# Patient Record
Sex: FEMALE | Race: WHITE | NOT HISPANIC OR LATINO | Employment: FULL TIME | URBAN - METROPOLITAN AREA
[De-identification: names, ages, dates, MRNs, and addresses within clinical notes are randomized per-mention and may not be internally consistent; named-entity substitution may affect disease eponyms.]

---

## 2018-01-10 ENCOUNTER — OFFICE VISIT CONVERTED (OUTPATIENT)
Dept: FAMILY MEDICINE CLINIC | Facility: CLINIC | Age: 38
End: 2018-01-10
Attending: NURSE PRACTITIONER

## 2018-11-26 ENCOUNTER — OFFICE VISIT CONVERTED (OUTPATIENT)
Dept: FAMILY MEDICINE CLINIC | Facility: CLINIC | Age: 38
End: 2018-11-26
Attending: NURSE PRACTITIONER

## 2019-04-28 ENCOUNTER — HOSPITAL ENCOUNTER (OUTPATIENT)
Dept: URGENT CARE | Facility: CLINIC | Age: 39
Discharge: HOME | End: 2019-04-28

## 2019-05-01 LAB — BACTERIA SPEC AEROBE CULT: NORMAL

## 2019-10-25 ENCOUNTER — HOSPITAL ENCOUNTER (OUTPATIENT)
Dept: URGENT CARE | Facility: CLINIC | Age: 39
Discharge: HOME | End: 2019-10-25

## 2020-04-27 ENCOUNTER — OFFICE VISIT CONVERTED (OUTPATIENT)
Dept: ORTHOPEDIC SURGERY | Facility: CLINIC | Age: 40
End: 2020-04-27
Attending: ORTHOPAEDIC SURGERY

## 2020-07-26 ENCOUNTER — HOSPITAL ENCOUNTER (OUTPATIENT)
Dept: URGENT CARE | Facility: CLINIC | Age: 40
Discharge: HOME | End: 2020-07-26
Attending: EMERGENCY MEDICINE

## 2020-07-26 LAB — URATE SERPL-MCNC: 5.1 MG/DL (ref 2.5–7.5)

## 2020-09-11 ENCOUNTER — OFFICE VISIT CONVERTED (OUTPATIENT)
Dept: ORTHOPEDIC SURGERY | Facility: CLINIC | Age: 40
End: 2020-09-11
Attending: ORTHOPAEDIC SURGERY

## 2020-09-11 ENCOUNTER — CONVERSION ENCOUNTER (OUTPATIENT)
Dept: ORTHOPEDIC SURGERY | Facility: CLINIC | Age: 40
End: 2020-09-11

## 2020-10-16 ENCOUNTER — HOSPITAL ENCOUNTER (OUTPATIENT)
Dept: URGENT CARE | Facility: CLINIC | Age: 40
Discharge: HOME | End: 2020-10-16
Attending: PHYSICIAN ASSISTANT

## 2020-10-20 LAB — SARS-COV-2 RNA SPEC QL NAA+PROBE: NOT DETECTED

## 2021-05-10 NOTE — H&P
History and Physical      Patient Name: Sunitha Ly   Patient ID: [id number]   Sex: Female   YOB: 1980    Primary Care Provider: Leanne OLIVIER    Visit Date: 2020    Provider: Ramy Lopez MD   Location: [facility]   Location Address: [address] [place] [address]  [id number]   Location Phone: [phone]          Chief Complaint  · Left shoulder pain      History Of Present Illness  Sunitha Ly is a 40 year old /White female who presents today to [facility].      Patient presents today for left shoulder pain that started after moving furniture. Patient states pain in the posterior aspect of the shoulder, radiating down to the left elbow. Patient states history of left shoulder bursitis. Patient received a steroid injection in  that did provide relief of pain.       Past Medical History  Allergic rhinitis; Arthritis; Bladder Disorder; Gall Stones; Kidney calculus; Obstructive sleep apnea; Seasonal allergies; Shoulder bursitis; STD exposure         Past Surgical History  C-sections; Cesarian Section; Cholecystectomy; Dilation and curretage; Gallbladder; Hysterectomy; Kidney; Kidney Stone Surgery, Unspecified         Medication List  Aleve oral; diclofenac sodium 75 mg oral tablet,delayed release (DR/EC); Vitamin D3 2,000 unit oral capsule; Zyrtec oral         Allergy List  NO KNOWN DRUG ALLERGIES         Family Medical History  Cancer, Unspecified; Family history of Arthritis         Reproductive History   3 Para 2 0 0 0       Social History  Alcohol (Never); Alcohol Use (Current some day); Claustophobic (Unknown); lives with children; lives with spouse; ; .; Recreational Drug Use (Never); Tobacco (Never); Working         Review of Systems  · Constitutional  o Denies  o : fever, chills, weight loss  · Cardiovascular  o Denies  o : chest pain, shortness of breath  · Gastrointestinal  o Denies  o : liver disease, heartburn,  nausea, blood in stools  · Genitourinary  o Denies  o : painful urination, blood in urine  · Integument  o Denies  o : rash, itching  · Neurologic  o Denies  o : headache, weakness, loss of consciousness  · Musculoskeletal  o Denies  o : painful, swollen joints  · Psychiatric  o Denies  o : drug/alcohol addiction, anxiety, depression      Vitals  Date Time BP Position Site L\R Cuff Size HR RR TEMP (F) WT  HT  BMI kg/m2 BSA m2 O2 Sat        04/27/2020 12:58 PM         240lbs 0oz 5'   46.87 2.15           Physical Examination  · Constitutional  o Appearance  o : well developed, well-nourished, no obvious deformities present  · Head and Face  o Head  o :   § Inspection  § : normocephalic  o Face  o :   § Inspection  § : no facial lesions  · Eyes  o Conjunctivae  o : conjunctivae normal  o Sclerae  o : sclerae white  · Ears, Nose, Mouth and Throat  o Ears  o :   § External Ears  § : appearance within normal limits  § Hearing  § : intact  o Nose  o :   § External Nose  § : appearance normal  · Neck  o Inspection/Palpation  o : normal appearance  o Range of Motion  o : full range of motion  · Respiratory  o Respiratory Effort  o : breathing unlabored  o Inspection of Chest  o : normal appearance  o Auscultation of Lungs  o : no audible wheezing or rales  · Cardiovascular  o Heart  o : regular rate  · Gastrointestinal  o Abdominal Examination  o : soft and non-tender  · Skin and Subcutaneous Tissue  o General Inspection  o : intact, no rashes  · Psychiatric  o General  o : Alert and oriented x3  o Judgement and Insight  o : judgment and insight intact  o Mood and Affect  o : mood normal, affect appropriate  · Left Shoulder  o Inspection  o : No skin discoloration, atrophy or swelling. Palpable tenderness to the subacromial bursa. Palpable tenderness to the Ac joint. Palpable tenderness onto the greater tuberosity . She has full ROM. Negative empty can. Negative Neers. Negative Hays. Neurovascularly intact. Sensations  grossly intact.   · Injection Note/Aspiration Note  o Site  o : left shoulder   o Procedure  o : Procedure: After educating the patient, patient gave consent for procedure. After using Chloraprep, the joint space was injected. The patient tolerated the procedure well.   o Medication  o : 80 mg of DepoMedrol with 9cc of 1% Lidocaine  · In Office Procedures  o View  o : AP/LATERAL  o Site  o : left shoulder  o Indication  o : left shoulder pain  o Study  o : X-rays ordered, taken in the office, and reviewed today.  o Xray  o : AC joint osteoarthritis  o Comparative Data  o : No comparative data found          Assessment  · Left shoulder pain, left shoulder bursitis     719.41/M25.512      Plan  · Orders  o Depo-Medrol injection 80mg () - - 04/27/2020   Lot [id number] exp 05 21 Paola Lopez MD  o Shoulder Intra-articular Injection without US Guidance [facility] ([id number]) - - 04/27/2020   Lot [id number] exp 07 01 21 [place]osp[place] Ramy Lopez MD  o Scapula (Left) [facility] Preferred View ([id number]) - 719.41/M25.512 - 04/27/2020  · Medications  o Medications have been Reconciled  o Transition of Care or Provider Policy  · Instructions  o Reviewed the patient's Past Medical, Social, and Family history as well as the ROS at today's visit, no changes.  o Call or return if worsening symptoms.  o X-ray ordered, taken and reviewed at this visit.  o Reviewed Xrays.  o This note was transcribed by Dimitrios Flores. lili  o Discussed plan with patient. Patient received a left shoulder steroid injection and Voltaren 75mg one pill bid with food. Follow up as needed. Home exercises explained and provided            Electronically Signed by: Gilmer Flores - , Other -Author on April 28, 2020 03:22:01 PM  Electronically Co-signed by: Glenny Watts PA-C -Reviewer on April 29, 2020 07:35:16 AM  Electronically Co-signed by: Ramy Lopez MD -Reviewer on April 29, 2020 08:59:30 AM

## 2021-05-10 NOTE — H&P
History and Physical      Patient Name: Sunitha Ly   Patient ID: [id number]   Sex: Female   YOB: 1980    Primary Care Provider: Leanne OLIVIER    Visit Date: 2020    Provider: Ramy Lopez MD   Location: [facility]   Location Address: [address] [place] [address]  [id number]   Location Phone: [phone]          Chief Complaint  · left ankle pain      History Of Present Illness  Sunitha Ly is a 40 year old /White female who presents today to [facility].      Patient presents today with chief complaint of left ankle pain. Patient states that she has no injury to her ankle but it just started hurting. Patient was on her feet a lot one day and her feet started hurting. The more the patient was staying on her feet for a couple more days and the ankle started to hurt and swell a lot. Patient states that her ankle isn't hurting her too much today. Patient had some swelling on and off for awhile since then                 Past Medical History  Allergic rhinitis; Arthritis; Bladder Disorder; Gall Stones; Kidney calculus; Obstructive sleep apnea; Seasonal allergies; Shoulder bursitis; STD exposure         Past Surgical History  C-sections; Cesarian Section; Cholecystectomy; Dilation and curretage; Gallbladder; Hysterectomy; Kidney; Kidney Stone Surgery, Unspecified         Medication List  Aleve oral; diclofenac sodium 75 mg oral tablet,delayed release (DR/EC); Vitamin D3 2,000 unit oral capsule; Zyrtec oral         Allergy List  NO KNOWN DRUG ALLERGIES       Allergies Reconciled  Family Medical History  Cancer, Unspecified; Family history of Arthritis         Reproductive History   3 Para 2 0 0 0       Social History  Alcohol (Never); Alcohol Use (Current some day); Claustophobic (Unknown); lives with children; lives with spouse; ; .; Recreational Drug Use (Never); Tobacco (Never); Working         Review of  Systems  · Constitutional  o Denies  o : fever, chills, weight loss  · Cardiovascular  o Denies  o : chest pain, shortness of breath  · Gastrointestinal  o Denies  o : liver disease, heartburn, nausea, blood in stools  · Genitourinary  o Denies  o : painful urination, blood in urine  · Integument  o Denies  o : rash, itching  · Neurologic  o Denies  o : headache, weakness, loss of consciousness  · Musculoskeletal  o Denies  o : painful, swollen joints  · Psychiatric  o Denies  o : drug/alcohol addiction, anxiety, depression      Vitals  Date Time BP Position Site L\R Cuff Size HR RR TEMP (F) WT  HT  BMI kg/m2 BSA m2 O2 Sat HC       09/11/2020 04:10 PM      64 - R   241lbs 16oz 5'   47.26 2.16 98 %          Physical Examination  · Constitutional  o Appearance  o : well developed, well-nourished, no obvious deformities present  · Head and Face  o Head  o :   § Inspection  § : normocephalic  o Face  o :   § Inspection  § : no facial lesions  · Eyes  o Conjunctivae  o : conjunctivae normal  o Sclerae  o : sclerae white  · Ears, Nose, Mouth and Throat  o Ears  o :   § External Ears  § : appearance within normal limits  § Hearing  § : intact  o Nose  o :   § External Nose  § : appearance normal  · Neck  o Inspection/Palpation  o : normal appearance  o Range of Motion  o : full range of motion  · Respiratory  o Respiratory Effort  o : breathing unlabored  o Inspection of Chest  o : normal appearance  o Auscultation of Lungs  o : no audible wheezing or rales  · Cardiovascular  o Heart  o : regular rate  · Gastrointestinal  o Abdominal Examination  o : soft and non-tender  · Skin and Subcutaneous Tissue  o General Inspection  o : intact, no rashes  · Psychiatric  o General  o : Alert and oriented x3  o Judgement and Insight  o : judgment and insight intact  o Mood and Affect  o : mood normal, affect appropriate  · Left Ankle/Foot  o Inspection  o : Sensation grossly intact. Neurovascular intact. Pulses normal. Mildly tender  to the lateral side of ankle. Mild swelling of left ankle compared to right. Stable gait. No skin discoloration or atrophy.   · In Office Procedures  o View  o : AP/LATERAL  o Site  o : left, ankle  o Indication  o : left ankle pain  o Study  o : X-rays ordered, taken in the office, and reviewed today.  o Xray  o : No acute osseous abnormality. No signs of fracture.  o Comparative Data  o : No comparative data found          Assessment  · Left ankle pain     719.47/M25.572      Plan  · Orders  o Ankle (Left) [facility] Preferred View ([id number]) - 719.47/M25.572 - 09/11/2020  · Medications  o Medications have been Reconciled  o Transition of Care or Provider Policy  · Instructions  o Dr. Lopez saw and examined the patient and agrees with plan.   o X-rays reviewed by Dr. Lopez.  o Reviewed the patient's Past Medical, Social, and Family history as well as the ROS at today's visit, no changes.  o Call or return if worsening symptoms.  o Follow Up PRN.  o This note was transcribed by Perri Michel. lili  o Discussed diagnosis and treatment options with the patient. Discussed the option of PT if pain worsens. Patient will follow-up PRN.            Electronically Signed by: Perri Michel-, Other -Author on September 14, 2020 09:31:33 AM  Electronically Co-signed by: Ramy Lopez MD -Reviewer on September 14, 2020 05:32:57 PM

## 2021-05-14 VITALS — BODY MASS INDEX: 47.51 KG/M2 | HEIGHT: 60 IN | HEART RATE: 64 BPM | OXYGEN SATURATION: 98 % | WEIGHT: 242 LBS

## 2021-05-15 VITALS — BODY MASS INDEX: 47.12 KG/M2 | HEIGHT: 60 IN | WEIGHT: 240 LBS

## 2021-05-16 VITALS
HEIGHT: 60 IN | SYSTOLIC BLOOD PRESSURE: 133 MMHG | OXYGEN SATURATION: 99 % | DIASTOLIC BLOOD PRESSURE: 77 MMHG | WEIGHT: 239.25 LBS | RESPIRATION RATE: 12 BRPM | HEART RATE: 86 BPM | TEMPERATURE: 97.9 F | BODY MASS INDEX: 46.97 KG/M2

## 2021-10-28 ENCOUNTER — APPOINTMENT (OUTPATIENT)
Dept: CT IMAGING | Facility: HOSPITAL | Age: 41
End: 2021-10-28

## 2021-10-28 ENCOUNTER — APPOINTMENT (OUTPATIENT)
Dept: ULTRASOUND IMAGING | Facility: HOSPITAL | Age: 41
End: 2021-10-28

## 2021-10-28 ENCOUNTER — HOSPITAL ENCOUNTER (EMERGENCY)
Facility: HOSPITAL | Age: 41
Discharge: HOME | End: 2021-10-28
Attending: EMERGENCY MEDICINE | Admitting: EMERGENCY MEDICINE

## 2021-10-28 VITALS
WEIGHT: 240.3 LBS | TEMPERATURE: 98 F | HEIGHT: 60 IN | DIASTOLIC BLOOD PRESSURE: 99 MMHG | RESPIRATION RATE: 16 BRPM | HEART RATE: 68 BPM | SYSTOLIC BLOOD PRESSURE: 165 MMHG | BODY MASS INDEX: 47.18 KG/M2 | OXYGEN SATURATION: 98 %

## 2021-10-28 DIAGNOSIS — N83.209 CYST OF OVARY, UNSPECIFIED LATERALITY: Primary | ICD-10-CM

## 2021-10-28 LAB
ALBUMIN SERPL-MCNC: 4 G/DL (ref 3.5–5.2)
ALBUMIN/GLOB SERPL: 1.3 G/DL
ALP SERPL-CCNC: 62 U/L (ref 39–117)
ALT SERPL W P-5'-P-CCNC: 18 U/L (ref 1–33)
ANION GAP SERPL CALCULATED.3IONS-SCNC: 10.9 MMOL/L (ref 5–15)
AST SERPL-CCNC: 12 U/L (ref 1–32)
BASOPHILS # BLD AUTO: 0.04 10*3/MM3 (ref 0–0.2)
BASOPHILS NFR BLD AUTO: 0.7 % (ref 0–1.5)
BILIRUB SERPL-MCNC: 0.4 MG/DL (ref 0–1.2)
BILIRUB UR QL STRIP: NEGATIVE
BUN SERPL-MCNC: 12 MG/DL (ref 6–20)
BUN/CREAT SERPL: 16.4 (ref 7–25)
CALCIUM SPEC-SCNC: 8.7 MG/DL (ref 8.6–10.5)
CHLORIDE SERPL-SCNC: 102 MMOL/L (ref 98–107)
CLARITY UR: CLEAR
CO2 SERPL-SCNC: 25.1 MMOL/L (ref 22–29)
COLOR UR: YELLOW
CREAT SERPL-MCNC: 0.73 MG/DL (ref 0.57–1)
DEPRECATED RDW RBC AUTO: 37.7 FL (ref 37–54)
EOSINOPHIL # BLD AUTO: 0.21 10*3/MM3 (ref 0–0.4)
EOSINOPHIL NFR BLD AUTO: 3.4 % (ref 0.3–6.2)
ERYTHROCYTE [DISTWIDTH] IN BLOOD BY AUTOMATED COUNT: 12.5 % (ref 12.3–15.4)
GFR SERPL CREATININE-BSD FRML MDRD: 89 ML/MIN/1.73
GLOBULIN UR ELPH-MCNC: 3.1 GM/DL
GLUCOSE SERPL-MCNC: 117 MG/DL (ref 65–99)
GLUCOSE UR STRIP-MCNC: NEGATIVE MG/DL
HCT VFR BLD AUTO: 41.8 % (ref 34–46.6)
HGB BLD-MCNC: 13.9 G/DL (ref 12–15.9)
HGB UR QL STRIP.AUTO: NEGATIVE
HOLD SPECIMEN: NORMAL
HOLD SPECIMEN: NORMAL
IMM GRANULOCYTES # BLD AUTO: 0.04 10*3/MM3 (ref 0–0.05)
IMM GRANULOCYTES NFR BLD AUTO: 0.7 % (ref 0–0.5)
KETONES UR QL STRIP: NEGATIVE
LEUKOCYTE ESTERASE UR QL STRIP.AUTO: NEGATIVE
LIPASE SERPL-CCNC: 18 U/L (ref 13–60)
LYMPHOCYTES # BLD AUTO: 1.63 10*3/MM3 (ref 0.7–3.1)
LYMPHOCYTES NFR BLD AUTO: 26.7 % (ref 19.6–45.3)
MCH RBC QN AUTO: 27.9 PG (ref 26.6–33)
MCHC RBC AUTO-ENTMCNC: 33.3 G/DL (ref 31.5–35.7)
MCV RBC AUTO: 83.9 FL (ref 79–97)
MONOCYTES # BLD AUTO: 0.38 10*3/MM3 (ref 0.1–0.9)
MONOCYTES NFR BLD AUTO: 6.2 % (ref 5–12)
NEUTROPHILS NFR BLD AUTO: 3.8 10*3/MM3 (ref 1.7–7)
NEUTROPHILS NFR BLD AUTO: 62.3 % (ref 42.7–76)
NITRITE UR QL STRIP: NEGATIVE
NRBC BLD AUTO-RTO: 0 /100 WBC (ref 0–0.2)
PH UR STRIP.AUTO: 7.5 [PH] (ref 5–8)
PLATELET # BLD AUTO: 163 10*3/MM3 (ref 140–450)
PMV BLD AUTO: 10.8 FL (ref 6–12)
POTASSIUM SERPL-SCNC: 3.9 MMOL/L (ref 3.5–5.2)
PROT SERPL-MCNC: 7.1 G/DL (ref 6–8.5)
PROT UR QL STRIP: NEGATIVE
RBC # BLD AUTO: 4.98 10*6/MM3 (ref 3.77–5.28)
SODIUM SERPL-SCNC: 138 MMOL/L (ref 136–145)
SP GR UR STRIP: 1.01 (ref 1–1.03)
UROBILINOGEN UR QL STRIP: NORMAL
WBC # BLD AUTO: 6.1 10*3/MM3 (ref 3.4–10.8)
WHOLE BLOOD HOLD SPECIMEN: NORMAL
WHOLE BLOOD HOLD SPECIMEN: NORMAL

## 2021-10-28 PROCEDURE — 81003 URINALYSIS AUTO W/O SCOPE: CPT | Performed by: EMERGENCY MEDICINE

## 2021-10-28 PROCEDURE — 83690 ASSAY OF LIPASE: CPT | Performed by: EMERGENCY MEDICINE

## 2021-10-28 PROCEDURE — 36415 COLL VENOUS BLD VENIPUNCTURE: CPT | Performed by: EMERGENCY MEDICINE

## 2021-10-28 PROCEDURE — 99283 EMERGENCY DEPT VISIT LOW MDM: CPT

## 2021-10-28 PROCEDURE — 76830 TRANSVAGINAL US NON-OB: CPT

## 2021-10-28 PROCEDURE — 80053 COMPREHEN METABOLIC PANEL: CPT | Performed by: EMERGENCY MEDICINE

## 2021-10-28 PROCEDURE — 85025 COMPLETE CBC W/AUTO DIFF WBC: CPT | Performed by: EMERGENCY MEDICINE

## 2021-10-28 PROCEDURE — 96374 THER/PROPH/DIAG INJ IV PUSH: CPT

## 2021-10-28 PROCEDURE — 25010000002 KETOROLAC TROMETHAMINE PER 15 MG: Performed by: EMERGENCY MEDICINE

## 2021-10-28 PROCEDURE — 74176 CT ABD & PELVIS W/O CONTRAST: CPT

## 2021-10-28 RX ORDER — KETOROLAC TROMETHAMINE 10 MG/1
10 TABLET, FILM COATED ORAL EVERY 6 HOURS PRN
Qty: 15 TABLET | Refills: 0 | Status: SHIPPED | OUTPATIENT
Start: 2021-10-28 | End: 2022-08-09

## 2021-10-28 RX ORDER — OXYCODONE HYDROCHLORIDE AND ACETAMINOPHEN 5; 325 MG/1; MG/1
1 TABLET ORAL EVERY 6 HOURS PRN
Qty: 12 TABLET | Refills: 0 | Status: SHIPPED | OUTPATIENT
Start: 2021-10-28 | End: 2022-08-09

## 2021-10-28 RX ORDER — KETOROLAC TROMETHAMINE 15 MG/ML
15 INJECTION, SOLUTION INTRAMUSCULAR; INTRAVENOUS ONCE
Status: COMPLETED | OUTPATIENT
Start: 2021-10-28 | End: 2021-10-28

## 2021-10-28 RX ORDER — SODIUM CHLORIDE 0.9 % (FLUSH) 0.9 %
10 SYRINGE (ML) INJECTION AS NEEDED
Status: DISCONTINUED | OUTPATIENT
Start: 2021-10-28 | End: 2021-10-28 | Stop reason: HOSPADM

## 2021-10-28 RX ADMIN — KETOROLAC TROMETHAMINE 15 MG: 15 INJECTION, SOLUTION INTRAMUSCULAR; INTRAVENOUS at 10:53

## 2021-11-02 ENCOUNTER — OFFICE VISIT (OUTPATIENT)
Dept: OBSTETRICS AND GYNECOLOGY | Facility: CLINIC | Age: 41
End: 2021-11-02

## 2021-11-02 VITALS
HEART RATE: 107 BPM | SYSTOLIC BLOOD PRESSURE: 128 MMHG | DIASTOLIC BLOOD PRESSURE: 84 MMHG | WEIGHT: 240 LBS | BODY MASS INDEX: 44.16 KG/M2 | HEIGHT: 62 IN

## 2021-11-02 DIAGNOSIS — Z12.31 SCREENING MAMMOGRAM FOR BREAST CANCER: ICD-10-CM

## 2021-11-02 DIAGNOSIS — N83.201 CYSTS OF BOTH OVARIES: ICD-10-CM

## 2021-11-02 DIAGNOSIS — N83.202 CYSTS OF BOTH OVARIES: ICD-10-CM

## 2021-11-02 DIAGNOSIS — Z01.419 WELL WOMAN EXAM WITH ROUTINE GYNECOLOGICAL EXAM: Primary | ICD-10-CM

## 2021-11-02 LAB
GLUCOSE UR STRIP-MCNC: NEGATIVE MG/DL
PROT UR STRIP-MCNC: NEGATIVE MG/DL

## 2021-11-02 PROCEDURE — G0123 SCREEN CERV/VAG THIN LAYER: HCPCS | Performed by: NURSE PRACTITIONER

## 2021-11-02 PROCEDURE — 99212 OFFICE O/P EST SF 10 MIN: CPT | Performed by: NURSE PRACTITIONER

## 2021-11-02 PROCEDURE — 81002 URINALYSIS NONAUTO W/O SCOPE: CPT | Performed by: NURSE PRACTITIONER

## 2021-11-02 PROCEDURE — 99385 PREV VISIT NEW AGE 18-39: CPT | Performed by: NURSE PRACTITIONER

## 2021-11-02 RX ORDER — ERGOCALCIFEROL (VITAMIN D2) 10 MCG
500 TABLET ORAL DAILY
COMMUNITY

## 2021-11-02 NOTE — PROGRESS NOTES
"  HPI:   41 y.o..Presents for well woman exam. Contraception or HRT: s/p hysterectomy supracervical, ovaries remain, secondary to emergency post partum, Placenta accreta, uterine attatchement  Menses:   No vb  Pain: intermittent cyclical pelvic cramping and low back lasting 1 day   Last pap normal   Recent visit to ER for evaluation of lower abdominal cramping and left low back pain. nonobstruction kidney stone noted left kidney in addition pelvic ultrasound revealed a 4.5 centimeter lesion in the left ovary without internal vascularity, likely a hemorrhagic cyst.  Endometrioma thought less likely, a 4.6 centimeter structure in the expected region of the uterus that may reflect a prominent cervix and vaginal cuff.       Past Medical History:   Diagnosis Date   • Abnormal Pap smear of cervix    • Chlamydia    • Endometriosis    • Gonorrhea    • Kidney stone    • Migraine    • Ovarian cyst    • Placenta accreta    • Urinary tract infection       Past Surgical History:   Procedure Laterality Date   •  SECTION     • CHOLECYSTECTOMY     • CYSTOSCOPY BLADDER STONE LITHOTRIPSY     • HYSTERECTOMY      emergency post partum, Placenta accreta   • WISDOM TOOTH EXTRACTION        Family History   Problem Relation Age of Onset   • Hypertension Father    • Breast cancer Mother 52   • Hypertension Brother    • Melanoma Paternal Grandfather 80   • Prostate cancer Maternal Grandfather         PCP: does manage PMHx and preventative labs      PHYSICAL EXAM:  /84   Pulse 107   Ht 156.2 cm (61.5\")   Wt 109 kg (240 lb)   Breastfeeding No   BMI 44.61 kg/m²   General- NAD, alert and oriented, appropriate  Psych- Normal mood, good memory  Neck- No masses, no thyroid enlargement  Lymphatic- No palpable neck, axillary, or groin nodes  CV- Regular rhythm, no murnurs  Resp- CTA to bases, no wheezes  Abdomen- Soft, non distended, non tender, no masses  Breast left-  Bilaterally symmetrical, no masses, non tender, no " nipple discharge  Breast right- Bilaterally symmetrical, no masses, non tender, no nipple discharge  External genitalia- Normal female, no lesions  Urethra/meatus- Normal, no masses, non tender, no prolapse  Bladder- Normal, no masses, non tender, no prolapse  Vagina- Normal, no atrophy, no lesions, no discharge, no prolapse  Cvx- Normal, no lesions, no discharge, No cervical motion tenderness  Uterus- Absent  Adnexa- No mass, non tender  Anus/Rectum/Perineum- Not performed  Ext- No edema, no cyanosis    Skin- No lesions, no rashes, no acanthosis nigricans        ASSESSMENT and PLAN:  WWE    Diagnoses and all orders for this visit:    1. Well woman exam with routine gynecological exam (Primary)  -     IGP,rfx Aptima HPV All Pth  -     POC Urinalysis Dipstick    2. Screening mammogram for breast cancer  -     Mammo Screening Digital Tomosynthesis Bilateral With CAD; Future    3. Cysts of both ovaries  -     US Pelvis Transvaginal Non OB; Future         Glucose, UA   Date Value Ref Range Status   11/02/2021 Negative Negative, 1000 mg/dL (3+) mg/dL Final      Protein, POC   Date Value Ref Range Status   11/02/2021 Negative Negative mg/dL Final        Preventative:   BREAST HEALTH- Monthly self breast exam importance and how to reviewed. MMG and/or MRI (prn) reviewed per society guidelines and her individual history. Screen: Updated today.  CERVICAL CANCER Screening- Reviewed current ASCCP guidelines for screening w and wo cotest HPV, age specific.  Screen: Updated today.  COLON CANCER Screening- Reviewed current medical society guidelines and options.  Screen:  Not medically needed.  SEXUAL HEALTH: Declines STD screening.  Follow up PCP/Specialist PMHx and Labs  Myriad: previous testing, no gene mutation identified      She understands the importance of having any ordered tests to be performed in a timely fashion.  The risks of not performing them include, but are not limited to, advanced cancer stages, bone loss from  osteoporosis and/or subsequent increase in morbidity and/or mortality.  She is encouraged to review her results online and/or contact or office if she has questions.     Follow Up:  Return after ultrasound.      SOY Brown  11/02/2021

## 2021-11-08 LAB
CONV .: NORMAL
CYTOLOGIST CVX/VAG CYTO: NORMAL
CYTOLOGY CVX/VAG DOC CYTO: NORMAL
CYTOLOGY CVX/VAG DOC THIN PREP: NORMAL
DX ICD CODE: NORMAL
HIV 1 & 2 AB SER-IMP: NORMAL
OTHER STN SPEC: NORMAL
STAT OF ADQ CVX/VAG CYTO-IMP: NORMAL

## 2021-12-14 ENCOUNTER — HOSPITAL ENCOUNTER (OUTPATIENT)
Dept: ULTRASOUND IMAGING | Facility: HOSPITAL | Age: 41
Discharge: HOME | End: 2021-12-14
Admitting: NURSE PRACTITIONER

## 2021-12-14 DIAGNOSIS — N83.202 CYSTS OF BOTH OVARIES: ICD-10-CM

## 2021-12-14 DIAGNOSIS — N83.201 CYSTS OF BOTH OVARIES: ICD-10-CM

## 2021-12-14 PROCEDURE — 76830 TRANSVAGINAL US NON-OB: CPT

## 2021-12-14 PROCEDURE — 76856 US EXAM PELVIC COMPLETE: CPT

## 2021-12-15 ENCOUNTER — OFFICE VISIT (OUTPATIENT)
Dept: OBSTETRICS AND GYNECOLOGY | Facility: CLINIC | Age: 41
End: 2021-12-15

## 2021-12-15 VITALS
DIASTOLIC BLOOD PRESSURE: 87 MMHG | SYSTOLIC BLOOD PRESSURE: 142 MMHG | WEIGHT: 244 LBS | BODY MASS INDEX: 45.36 KG/M2 | HEART RATE: 79 BPM

## 2021-12-15 DIAGNOSIS — R10.31 RIGHT LOWER QUADRANT ABDOMINAL PAIN: Primary | ICD-10-CM

## 2021-12-15 PROCEDURE — 99213 OFFICE O/P EST LOW 20 MIN: CPT | Performed by: OBSTETRICS & GYNECOLOGY

## 2021-12-15 NOTE — PROGRESS NOTES
GYN Problem/Follow Up Visit    Chief Complaint   Patient presents with   • Follow-up     US done       Complaint prior acute left-sided/pelvic pain     HPI  Sunitha Ly is a 41 y.o. female, , who presents for evaluation of pain and review of ultrasound  Sunitha's ultrasound on 2021 noted a 5 cm possible hemorrhagic cyst.  The ultrasound repeated yesterday on  noted a normal left ovary.    Additional OB/GYN History   No LMP recorded. Patient has had a hysterectomy.  Allergies : Patient has no known allergies.       Objective   /87   Pulse 79   Wt 111 kg (244 lb)   BMI 45.36 kg/m²     Physical Exam   Alert and oriented x4  Elevated BMI of 45       Assessment and Plan    Diagnoses and all orders for this visit:    1. Right lower quadrant abdominal pain (Primary)    Pain has resolved.  The ultrasound notes no ovarian structure that would require treatment.  No surgery needed.  Discussed that PCO  would be a benign problem after hysterectomy.  Encourage her to follow-up with PCP concerning insulin resistance.  Encourage use of NSAIDs as needed    Follow Up:  No follow-ups on file.        Juaquin Quintero Sr., MD  12/15/2021

## 2022-02-01 ENCOUNTER — HOSPITAL ENCOUNTER (OUTPATIENT)
Dept: MAMMOGRAPHY | Facility: HOSPITAL | Age: 42
Discharge: HOME | End: 2022-02-01
Admitting: NURSE PRACTITIONER

## 2022-02-01 DIAGNOSIS — Z12.31 SCREENING MAMMOGRAM FOR BREAST CANCER: ICD-10-CM

## 2022-02-01 PROCEDURE — 77067 SCR MAMMO BI INCL CAD: CPT

## 2022-08-09 ENCOUNTER — OFFICE VISIT (OUTPATIENT)
Dept: FAMILY MEDICINE CLINIC | Facility: CLINIC | Age: 42
End: 2022-08-09

## 2022-08-09 VITALS
HEART RATE: 78 BPM | OXYGEN SATURATION: 99 % | WEIGHT: 253 LBS | DIASTOLIC BLOOD PRESSURE: 78 MMHG | SYSTOLIC BLOOD PRESSURE: 132 MMHG | BODY MASS INDEX: 49.67 KG/M2 | TEMPERATURE: 98.5 F | HEIGHT: 60 IN

## 2022-08-09 DIAGNOSIS — Z11.59 ENCOUNTER FOR HEPATITIS C SCREENING TEST FOR LOW RISK PATIENT: ICD-10-CM

## 2022-08-09 DIAGNOSIS — F32.A DEPRESSION, UNSPECIFIED DEPRESSION TYPE: ICD-10-CM

## 2022-08-09 DIAGNOSIS — F41.9 ANXIETY: ICD-10-CM

## 2022-08-09 DIAGNOSIS — E66.01 CLASS 3 SEVERE OBESITY DUE TO EXCESS CALORIES WITHOUT SERIOUS COMORBIDITY WITH BODY MASS INDEX (BMI) OF 45.0 TO 49.9 IN ADULT: ICD-10-CM

## 2022-08-09 DIAGNOSIS — R53.83 FATIGUE, UNSPECIFIED TYPE: Primary | ICD-10-CM

## 2022-08-09 PROBLEM — E66.813 CLASS 3 SEVERE OBESITY DUE TO EXCESS CALORIES WITHOUT SERIOUS COMORBIDITY WITH BODY MASS INDEX (BMI) OF 45.0 TO 49.9 IN ADULT: Status: ACTIVE | Noted: 2022-08-09

## 2022-08-09 LAB
25(OH)D3 SERPL-MCNC: 51.6 NG/ML (ref 30–100)
ALBUMIN SERPL-MCNC: 4.2 G/DL (ref 3.5–5.2)
ALBUMIN/GLOB SERPL: 1.5 G/DL
ALP SERPL-CCNC: 62 U/L (ref 39–117)
ALT SERPL W P-5'-P-CCNC: 22 U/L (ref 1–33)
ANION GAP SERPL CALCULATED.3IONS-SCNC: 10 MMOL/L (ref 5–15)
AST SERPL-CCNC: 17 U/L (ref 1–32)
BASOPHILS # BLD AUTO: 0.04 10*3/MM3 (ref 0–0.2)
BASOPHILS NFR BLD AUTO: 0.6 % (ref 0–1.5)
BILIRUB SERPL-MCNC: 0.2 MG/DL (ref 0–1.2)
BUN SERPL-MCNC: 12 MG/DL (ref 6–20)
BUN/CREAT SERPL: 16.7 (ref 7–25)
CALCIUM SPEC-SCNC: 9.1 MG/DL (ref 8.6–10.5)
CHLORIDE SERPL-SCNC: 100 MMOL/L (ref 98–107)
CHOLEST SERPL-MCNC: 186 MG/DL (ref 0–200)
CO2 SERPL-SCNC: 27 MMOL/L (ref 22–29)
CREAT SERPL-MCNC: 0.72 MG/DL (ref 0.57–1)
DEPRECATED RDW RBC AUTO: 41 FL (ref 37–54)
EGFRCR SERPLBLD CKD-EPI 2021: 108.6 ML/MIN/1.73
EOSINOPHIL # BLD AUTO: 0.2 10*3/MM3 (ref 0–0.4)
EOSINOPHIL NFR BLD AUTO: 2.8 % (ref 0.3–6.2)
ERYTHROCYTE [DISTWIDTH] IN BLOOD BY AUTOMATED COUNT: 13.4 % (ref 12.3–15.4)
GLOBULIN UR ELPH-MCNC: 2.8 GM/DL
GLUCOSE SERPL-MCNC: 114 MG/DL (ref 65–99)
HCT VFR BLD AUTO: 39.5 % (ref 34–46.6)
HCV AB SER DONR QL: NORMAL
HDLC SERPL-MCNC: 38 MG/DL (ref 40–60)
HGB BLD-MCNC: 13.1 G/DL (ref 12–15.9)
IMM GRANULOCYTES # BLD AUTO: 0.03 10*3/MM3 (ref 0–0.05)
IMM GRANULOCYTES NFR BLD AUTO: 0.4 % (ref 0–0.5)
IRON 24H UR-MRATE: 41 MCG/DL (ref 37–145)
IRON SATN MFR SERPL: 13 % (ref 20–50)
LDLC SERPL CALC-MCNC: 112 MG/DL (ref 0–100)
LDLC/HDLC SERPL: 2.82 {RATIO}
LYMPHOCYTES # BLD AUTO: 1.72 10*3/MM3 (ref 0.7–3.1)
LYMPHOCYTES NFR BLD AUTO: 24.4 % (ref 19.6–45.3)
MCH RBC QN AUTO: 27.8 PG (ref 26.6–33)
MCHC RBC AUTO-ENTMCNC: 33.2 G/DL (ref 31.5–35.7)
MCV RBC AUTO: 83.7 FL (ref 79–97)
MONOCYTES # BLD AUTO: 0.48 10*3/MM3 (ref 0.1–0.9)
MONOCYTES NFR BLD AUTO: 6.8 % (ref 5–12)
NEUTROPHILS NFR BLD AUTO: 4.58 10*3/MM3 (ref 1.7–7)
NEUTROPHILS NFR BLD AUTO: 65 % (ref 42.7–76)
NRBC BLD AUTO-RTO: 0 /100 WBC (ref 0–0.2)
PLATELET # BLD AUTO: 178 10*3/MM3 (ref 140–450)
PMV BLD AUTO: 11.2 FL (ref 6–12)
POTASSIUM SERPL-SCNC: 4.1 MMOL/L (ref 3.5–5.2)
PROT SERPL-MCNC: 7 G/DL (ref 6–8.5)
RBC # BLD AUTO: 4.72 10*6/MM3 (ref 3.77–5.28)
SODIUM SERPL-SCNC: 137 MMOL/L (ref 136–145)
T-UPTAKE NFR SERPL: 1.01 TBI (ref 0.8–1.3)
T4 SERPL-MCNC: 5.99 MCG/DL (ref 4.5–11.7)
TIBC SERPL-MCNC: 313 MCG/DL (ref 298–536)
TRANSFERRIN SERPL-MCNC: 210 MG/DL (ref 200–360)
TRIGL SERPL-MCNC: 204 MG/DL (ref 0–150)
TSH SERPL DL<=0.05 MIU/L-ACNC: 1.37 UIU/ML (ref 0.27–4.2)
VLDLC SERPL-MCNC: 36 MG/DL (ref 5–40)
WBC NRBC COR # BLD: 7.05 10*3/MM3 (ref 3.4–10.8)

## 2022-08-09 PROCEDURE — 84479 ASSAY OF THYROID (T3 OR T4): CPT

## 2022-08-09 PROCEDURE — 83036 HEMOGLOBIN GLYCOSYLATED A1C: CPT

## 2022-08-09 PROCEDURE — 84436 ASSAY OF TOTAL THYROXINE: CPT

## 2022-08-09 PROCEDURE — 80061 LIPID PANEL: CPT

## 2022-08-09 PROCEDURE — 80050 GENERAL HEALTH PANEL: CPT

## 2022-08-09 PROCEDURE — 99204 OFFICE O/P NEW MOD 45 MIN: CPT

## 2022-08-09 PROCEDURE — 86803 HEPATITIS C AB TEST: CPT

## 2022-08-09 PROCEDURE — 84466 ASSAY OF TRANSFERRIN: CPT

## 2022-08-09 PROCEDURE — 82306 VITAMIN D 25 HYDROXY: CPT

## 2022-08-09 PROCEDURE — 83540 ASSAY OF IRON: CPT

## 2022-08-09 RX ORDER — BUSPIRONE HYDROCHLORIDE 5 MG/1
5 TABLET ORAL 3 TIMES DAILY
Qty: 60 TABLET | Refills: 1 | Status: SHIPPED | OUTPATIENT
Start: 2022-08-09 | End: 2022-10-26

## 2022-08-09 RX ORDER — ESCITALOPRAM OXALATE 10 MG/1
10 TABLET ORAL DAILY
Qty: 30 TABLET | Refills: 1 | Status: SHIPPED | OUTPATIENT
Start: 2022-08-09 | End: 2022-10-24

## 2022-08-09 NOTE — PROGRESS NOTES
Chief Complaint  Chief Complaint   Patient presents with   • Establish Care   • hormone check   • Fatigue   • Anxiety       Subjective          Sunitha Ly presents to [facility]  History of Present Illness  Patient is a 42-year-old female who presents today to establish care.  She has not seen a PCP in several years.  She last had a Pap smear in 2021 and a mammogram in 2022.  She does complain today of depression and anxiety with a PHQ 9 of 17.  She reports that she was treated for postpartum depression after the birth of her first child who is now 13 but she does not recall what medication she took.  She is not currently taking any medication for depression or anxiety or seeing a therapist or psychiatrist.  She also complains of fatigue and weight gain.  Objective     Medical History:  Past Medical History:   Diagnosis Date   • Abnormal Pap smear of cervix    • Arthritis    • Chlamydia    • Endometriosis    • Gonorrhea    • Kidney stone    • Migraine    • Ovarian cyst    • Placenta accreta    • Urinary tract infection      Past Surgical History:   Procedure Laterality Date   •  SECTION     • CHOLECYSTECTOMY     • CYSTOSCOPY BLADDER STONE LITHOTRIPSY     • HYSTERECTOMY  2014    emergency post partum, Placenta accreta   • WISDOM TOOTH EXTRACTION        Social History     Tobacco Use   • Smoking status: Never Smoker   • Smokeless tobacco: Never Used   Vaping Use   • Vaping Use: Never used   Substance Use Topics   • Alcohol use: Yes     Comment: ocassional   • Drug use: Never     Family History   Problem Relation Age of Onset   • Hypertension Father    • Breast cancer Mother 52   • Hypertension Brother    • Melanoma Paternal Grandfather 80   • Prostate cancer Maternal Grandfather        Medications:  Prior to Admission medications    Medication Sig Start Date End Date Taking? Authorizing Provider   Vitamin D, Cholecalciferol, (CHOLECALCIFEROL) 10 MCG  "(400 UNIT) tablet Take 500 Units by mouth Daily.   Yes Provider, MD Joanne   ketorolac (TORADOL) 10 MG tablet Take 1 tablet by mouth Every 6 (Six) Hours As Needed for Moderate Pain . 10/28/21   Suad Coburn MD   Naproxen Sodium (ALEVE PO)     Provider, MD Joanne   oxyCODONE-acetaminophen (PERCOCET) 5-325 MG per tablet Take 1 tablet by mouth Every 6 (Six) Hours As Needed for Severe Pain . 10/28/21   Suad Coburn MD        Allergies:   Patient has no known allergies.    Health Maintenance Due   Topic Date Due   • ANNUAL PHYSICAL  Never done   • HEPATITIS C SCREENING  Never done   • COVID-19 Vaccine (2 - Moderna series) 12/03/2021           Vital Signs:     /78   Pulse 78   Temp 98.5 °F (36.9 °C)   Ht 152.4 cm (60\")   Wt 115 kg (253 lb)   SpO2 99%   BMI 49.41 kg/m²       Physical Exam  Vitals reviewed.   Constitutional:       Appearance: Normal appearance. She is well-developed. She is obese.   HENT:      Head: Normocephalic and atraumatic.      Right Ear: External ear normal.      Left Ear: External ear normal.      Mouth/Throat:      Pharynx: No oropharyngeal exudate.   Eyes:      Conjunctiva/sclera: Conjunctivae normal.      Pupils: Pupils are equal, round, and reactive to light.   Cardiovascular:      Rate and Rhythm: Normal rate and regular rhythm.      Heart sounds: No murmur heard.    No friction rub. No gallop.   Pulmonary:      Effort: Pulmonary effort is normal.      Breath sounds: Normal breath sounds. No wheezing or rhonchi.   Abdominal:      General: Bowel sounds are normal. There is no distension.      Palpations: Abdomen is soft.      Tenderness: There is no abdominal tenderness.   Skin:     General: Skin is warm and dry.   Neurological:      Mental Status: She is alert and oriented to person, place, and time.      Cranial Nerves: No cranial nerve deficit.   Psychiatric:         Mood and Affect: Mood and affect normal.         Behavior: Behavior normal.         Thought " Content: Thought content normal.         Judgment: Judgment normal.          Result Review :                   Assessment and Plan    Diagnoses and all orders for this visit:    1. Fatigue, unspecified type (Primary)  -     Vitamin D 25 hydroxy  -     CBC & Differential  -     Thyroid Panel With TSH  -     Iron Profile    2. Depression, unspecified depression type  -     Thyroid Panel With TSH  -     escitalopram (Lexapro) 10 MG tablet; Take 1 tablet by mouth Daily.  Dispense: 30 tablet; Refill: 1    3. Anxiety  -     Thyroid Panel With TSH  -     busPIRone (BUSPAR) 5 MG tablet; Take 1 tablet by mouth 3 (Three) Times a Day.  Dispense: 60 tablet; Refill: 1    4. Encounter for hepatitis C screening test for low risk patient  -     Hepatitis C antibody    5. Class 3 severe obesity due to excess calories without serious comorbidity with body mass index (BMI) of 45.0 to 49.9 in adult (HCC)  -     Comprehensive Metabolic Panel  -     Lipid Panel           Smoking Cessation:    Sunitha Larsonsdon  reports that she has never smoked. She has never used smokeless tobacco.          Follow Up   Return in about 6 weeks (around 9/20/2022).  Patient was given instructions and counseling regarding her condition or for health maintenance advice. Please see specific information pulled into the AVS if appropriate.

## 2022-08-10 DIAGNOSIS — R73.09 ELEVATED GLUCOSE LEVEL: Primary | ICD-10-CM

## 2022-08-10 LAB — HBA1C MFR BLD: 6.2 % (ref 4.8–5.6)

## 2022-08-12 RX ORDER — METFORMIN HYDROCHLORIDE 500 MG/1
500 TABLET, EXTENDED RELEASE ORAL
Qty: 30 TABLET | Refills: 5 | Status: SHIPPED | OUTPATIENT
Start: 2022-08-12

## 2022-09-27 ENCOUNTER — OFFICE VISIT (OUTPATIENT)
Dept: FAMILY MEDICINE CLINIC | Facility: CLINIC | Age: 42
End: 2022-09-27

## 2022-09-27 VITALS
SYSTOLIC BLOOD PRESSURE: 124 MMHG | WEIGHT: 239 LBS | DIASTOLIC BLOOD PRESSURE: 78 MMHG | BODY MASS INDEX: 46.92 KG/M2 | TEMPERATURE: 98.1 F | HEIGHT: 60 IN | HEART RATE: 67 BPM | OXYGEN SATURATION: 99 %

## 2022-09-27 DIAGNOSIS — E78.1 HYPERTRIGLYCERIDEMIA: ICD-10-CM

## 2022-09-27 DIAGNOSIS — R73.03 PREDIABETES: ICD-10-CM

## 2022-09-27 DIAGNOSIS — E66.01 CLASS 3 SEVERE OBESITY DUE TO EXCESS CALORIES WITHOUT SERIOUS COMORBIDITY WITH BODY MASS INDEX (BMI) OF 45.0 TO 49.9 IN ADULT: ICD-10-CM

## 2022-09-27 DIAGNOSIS — F32.A DEPRESSION, UNSPECIFIED DEPRESSION TYPE: Primary | ICD-10-CM

## 2022-09-27 DIAGNOSIS — F41.9 ANXIETY: ICD-10-CM

## 2022-09-27 PROCEDURE — 99214 OFFICE O/P EST MOD 30 MIN: CPT

## 2022-09-27 NOTE — ASSESSMENT & PLAN NOTE
Patient's (Body mass index is 46.68 kg/m².) indicates that they are morbidly obese (BMI > 40 or > 35 with obesity - related health condition) with health conditions that include diabetes mellitus . Weight is improving with lifestyle modifications. BMI is is above average; BMI management plan is completed. We discussed portion control, increasing exercise and management of depression/anxiety/stress to control compensatory eating.

## 2022-09-27 NOTE — PROGRESS NOTES
Chief Complaint  Chief Complaint   Patient presents with   • Anxiety   • Depression       Subjective          Sunitha Ly presents to [facility]  History of Present Illness  Patient presents today to follow up on depression and anxiety. At her last visit she was started on Lexapro 10 mg daily and Buspirone 5 mg 3 times daily as needed.  She does report an improvement in symptoms of depression and anxiety.  She is less irritable than she previously was reporting.  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    Patient has lost approximately 10 pounds since she was last seen.  She attributes this to better eating habits and an improvement in her overall mood.  Objective     Medical History:  Past Medical History:   Diagnosis Date   • Abnormal Pap smear of cervix    • Arthritis    • Chlamydia    • Diabetes mellitus (HCC)    • Endometriosis    • Gonorrhea    • Kidney stone    • Migraine    • Ovarian cyst    • Placenta accreta    • Urinary tract infection      Past Surgical History:   Procedure Laterality Date   •  SECTION     • CHOLECYSTECTOMY     • CYSTOSCOPY BLADDER STONE LITHOTRIPSY     • HYSTERECTOMY      emergency post partum, Placenta accreta   • WISDOM TOOTH EXTRACTION        Social History     Tobacco Use   • Smoking status: Never Smoker   • Smokeless tobacco: Never Used   Vaping Use   • Vaping Use: Never used   Substance Use Topics   • Alcohol use: Yes     Comment: ocassional   • Drug use: Never     Family History   Problem Relation Age of Onset   • Hypertension Father    • Breast cancer Mother 52   • Hypertension Brother    • Melanoma Paternal Grandfather 80   • Prostate cancer Maternal Grandfather        Medications:  Prior to Admission medications    Medication Sig Start Date End Date Taking? Authorizing Provider   busPIRone (BUSPAR) 5 MG tablet Take 1 tablet by mouth 3 (Three) Times  "a Day. 8/9/22  Yes Radha Sethi APRN   escitalopram (Lexapro) 10 MG tablet Take 1 tablet by mouth Daily. 8/9/22  Yes Radha Sethi APRN   metFORMIN ER (GLUCOPHAGE-XR) 500 MG 24 hr tablet Take 1 tablet by mouth Daily With Breakfast. 8/12/22  Yes Radha Sethi APRN   Naproxen Sodium (ALEVE PO)    Yes Provider, MD Joanne   Vitamin D, Cholecalciferol, (CHOLECALCIFEROL) 10 MCG (400 UNIT) tablet Take 500 Units by mouth Daily.   Yes Provider, MD Joanne        Allergies:   Patient has no known allergies.    Health Maintenance Due   Topic Date Due   • ANNUAL PHYSICAL  Never done   • COVID-19 Vaccine (2 - Moderna series) 12/03/2021           Vital Signs:     /78   Pulse 67   Temp 98.1 °F (36.7 °C)   Ht 152.4 cm (60\")   Wt 108 kg (239 lb)   SpO2 99%   BMI 46.68 kg/m²       Physical Exam  Vitals reviewed.   Constitutional:       Appearance: Normal appearance. She is well-developed. She is obese.   HENT:      Head: Normocephalic and atraumatic.      Right Ear: External ear normal.      Left Ear: External ear normal.      Mouth/Throat:      Pharynx: No oropharyngeal exudate.   Eyes:      Conjunctiva/sclera: Conjunctivae normal.      Pupils: Pupils are equal, round, and reactive to light.   Cardiovascular:      Rate and Rhythm: Normal rate and regular rhythm.      Heart sounds: No murmur heard.    No friction rub. No gallop.   Pulmonary:      Effort: Pulmonary effort is normal.      Breath sounds: Normal breath sounds. No wheezing or rhonchi.   Abdominal:      General: Bowel sounds are normal. There is no distension.      Palpations: Abdomen is soft.      Tenderness: There is no abdominal tenderness.   Skin:     General: Skin is warm and dry.   Neurological:      Mental Status: She is alert and oriented to person, place, and time.      Cranial Nerves: No cranial nerve deficit.   Psychiatric:         Mood and Affect: Mood and affect normal.         Behavior: Behavior normal.    "      Thought Content: Thought content normal.         Judgment: Judgment normal.          Result Review :     CMP    CMP 10/28/21 8/9/22   Glucose 117 (A) 114 (A)   BUN 12 12   Creatinine 0.73 0.72   eGFR Non African Am 89    Sodium 138 137   Potassium 3.9 4.1   Chloride 102 100   Calcium 8.7 9.1   Albumin 4.00 4.20   Total Bilirubin 0.4 0.2   Alkaline Phosphatase 62 62   AST (SGOT) 12 17   ALT (SGPT) 18 22   (A) Abnormal value            CBC w/diff    CBC w/Diff 10/28/21 8/9/22   WBC 6.10 7.05   RBC 4.98 4.72   Hemoglobin 13.9 13.1   Hematocrit 41.8 39.5   MCV 83.9 83.7   MCH 27.9 27.8   MCHC 33.3 33.2   RDW 12.5 13.4   Platelets 163 178   Neutrophil Rel % 62.3 65.0   Immature Granulocyte Rel % 0.7 (A) 0.4   Lymphocyte Rel % 26.7 24.4   Monocyte Rel % 6.2 6.8   Eosinophil Rel % 3.4 2.8   Basophil Rel % 0.7 0.6   (A) Abnormal value            Lipid Panel    Lipid Panel 8/9/22   Total Cholesterol 186   Triglycerides 204 (A)   HDL Cholesterol 38 (A)   VLDL Cholesterol 36   LDL Cholesterol  112 (A)   LDL/HDL Ratio 2.82   (A) Abnormal value            TSH    TSH 8/9/22   TSH 1.370           Most Recent A1C    HGBA1C Most Recent 8/9/22   Hemoglobin A1C 6.20 (A)   (A) Abnormal value                        Assessment and Plan    Diagnoses and all orders for this visit:    1. Depression, unspecified depression type (Primary)  Comments:  Continue taking Lexapro 10 mg daily    2. Anxiety  Comments:  Continue taking BuSpar 5 mg 3 times daily as needed for anxiety    3. Hypertriglyceridemia  Comments:  Continue following low-fat, high-fiber diet and with decrease in blood glucose and further weight loss cholesterol and triglycerides should normalize.    4. Class 3 severe obesity due to excess calories without serious comorbidity with body mass index (BMI) of 45.0 to 49.9 in adult (HCC)  Assessment & Plan:  Patient's (Body mass index is 46.68 kg/m².) indicates that they are morbidly obese (BMI > 40 or > 35 with obesity - related  health condition) with health conditions that include diabetes mellitus . Weight is improving with lifestyle modifications. BMI is is above average; BMI management plan is completed. We discussed portion control, increasing exercise and management of depression/anxiety/stress to control compensatory eating.       5. Prediabetes  Comments:  Continue taking metformin  mg daily and follow a low-carb, low sugar diet.  Patient will follow-up in 3 months to continue monitoring depression and anxiety.  At that time we will recheck her A1c and lipids.      Smoking Cessation:    Sunitha Larsonsdon  reports that she has never smoked. She has never used smokeless tobacco.        Follow Up   Return in about 3 months (around 12/27/2022) for Recheck depression, anxiety.  Patient was given instructions and counseling regarding her condition or for health maintenance advice. Please see specific information pulled into the AVS if appropriate.       Answers for HPI/ROS submitted by the patient on 9/27/2022  Please describe your symptoms.: Followup from my last visit which resulted from blood test results.  Have you had these symptoms before?: Yes  How long have you been having these symptoms?: Greater than 2 weeks  Please list any medications you are currently taking for this condition.: Metformin, Buspirone, Escitalopram  What is the primary reason for your visit?: Other

## 2022-10-24 DIAGNOSIS — F32.A DEPRESSION, UNSPECIFIED DEPRESSION TYPE: ICD-10-CM

## 2022-10-24 RX ORDER — ESCITALOPRAM OXALATE 10 MG/1
TABLET ORAL
Qty: 30 TABLET | Refills: 1 | Status: SHIPPED | OUTPATIENT
Start: 2022-10-24 | End: 2022-12-29 | Stop reason: SDUPTHER

## 2022-10-26 DIAGNOSIS — F41.9 ANXIETY: ICD-10-CM

## 2022-10-26 RX ORDER — BUSPIRONE HYDROCHLORIDE 5 MG/1
TABLET ORAL
Qty: 60 TABLET | Refills: 1 | Status: SHIPPED | OUTPATIENT
Start: 2022-10-26 | End: 2023-01-03 | Stop reason: SDUPTHER

## 2022-12-29 DIAGNOSIS — F32.A DEPRESSION, UNSPECIFIED DEPRESSION TYPE: ICD-10-CM

## 2022-12-29 RX ORDER — ESCITALOPRAM OXALATE 10 MG/1
10 TABLET ORAL DAILY
Qty: 30 TABLET | Refills: 1 | Status: SHIPPED | OUTPATIENT
Start: 2022-12-29 | End: 2023-01-03 | Stop reason: SDUPTHER

## 2022-12-29 NOTE — TELEPHONE ENCOUNTER
Caller: Aliyah Sunitha Tanner    Relationship: Self    Best call back number: [phone]    Requested Prescriptions:   Requested Prescriptions     Pending Prescriptions Disp Refills   • escitalopram (LEXAPRO) 10 MG tablet 30 tablet 1     Sig: Take 1 tablet by mouth Daily.        Pharmacy where request should be sent: [id number] [place] [facility] [address] [facility] - [phone] [facility] [phone] FX     Does the patient have less than a 3 day supply:  [x] Yes  [] No    Would you like a call back once the refill request has been completed: [x] Yes [] No    If the office needs to give you a call back, can they leave a voicemail: [x] Yes [] No    Devonte Licea Rep   12/29/22 11:57 EST

## 2023-01-03 ENCOUNTER — OFFICE VISIT (OUTPATIENT)
Dept: FAMILY MEDICINE CLINIC | Facility: CLINIC | Age: 43
End: 2023-01-03
Payer: COMMERCIAL

## 2023-01-03 ENCOUNTER — TELEPHONE (OUTPATIENT)
Dept: FAMILY MEDICINE CLINIC | Facility: CLINIC | Age: 43
End: 2023-01-03

## 2023-01-03 VITALS
SYSTOLIC BLOOD PRESSURE: 128 MMHG | OXYGEN SATURATION: 100 % | TEMPERATURE: 97.9 F | BODY MASS INDEX: 47.12 KG/M2 | WEIGHT: 240 LBS | DIASTOLIC BLOOD PRESSURE: 82 MMHG | HEART RATE: 65 BPM | HEIGHT: 60 IN

## 2023-01-03 DIAGNOSIS — E66.01 CLASS 3 SEVERE OBESITY DUE TO EXCESS CALORIES WITHOUT SERIOUS COMORBIDITY WITH BODY MASS INDEX (BMI) OF 45.0 TO 49.9 IN ADULT: ICD-10-CM

## 2023-01-03 DIAGNOSIS — F32.A DEPRESSION, UNSPECIFIED DEPRESSION TYPE: ICD-10-CM

## 2023-01-03 DIAGNOSIS — E78.2 MODERATE MIXED HYPERLIPIDEMIA NOT REQUIRING STATIN THERAPY: ICD-10-CM

## 2023-01-03 DIAGNOSIS — R73.03 PREDIABETES: ICD-10-CM

## 2023-01-03 DIAGNOSIS — F41.9 ANXIETY: Primary | ICD-10-CM

## 2023-01-03 DIAGNOSIS — R26.89 LOSS OF BALANCE: ICD-10-CM

## 2023-01-03 PROCEDURE — 99214 OFFICE O/P EST MOD 30 MIN: CPT

## 2023-01-03 RX ORDER — BUSPIRONE HYDROCHLORIDE 5 MG/1
5 TABLET ORAL 3 TIMES DAILY
Qty: 90 TABLET | Refills: 2 | Status: SHIPPED | OUTPATIENT
Start: 2023-01-03

## 2023-01-03 RX ORDER — ESCITALOPRAM OXALATE 10 MG/1
10 TABLET ORAL DAILY
Qty: 30 TABLET | Refills: 2 | Status: SHIPPED | OUTPATIENT
Start: 2023-01-03

## 2023-01-03 NOTE — PROGRESS NOTES
Chief Complaint  Chief Complaint   Patient presents with   • Depression       Subjective      Sunitha Ly presents to [facility]  History of Present Illness  Patient presents today to follow-up on depression and anxiety.  She was started on Lexapro 10 mg and BuSpar 5 mg 3 times daily as needed at her visit in 2022.  She is only taking the BuSpar once daily currently. PHQ-9 today is 0.     Patient recently had a boil to her left lower abdomen.  She first noticed it 5 or 6 weeks ago and it then began to drain when she squeezed it.  The area is now bruised but has no drainage.  She states that she has never had any similar occurrences.    Patient reports that she occasionally will lose her balance and has fallen a couple times within the last few months because of this.  She denies any other neurologic symptoms such as dizziness or lightheadedness, blurry vision or loss of vision, numbness, tingling or loss of sensation to any extremities.    Objective     Medical History:  Past Medical History:   Diagnosis Date   • Abnormal Pap smear of cervix    • Arthritis    • Chlamydia    • Diabetes mellitus (HCC)    • Endometriosis    • Gonorrhea    • Kidney stone    • Migraine    • Ovarian cyst    • Placenta accreta    • Urinary tract infection      Past Surgical History:   Procedure Laterality Date   •  SECTION     • CHOLECYSTECTOMY     • CYSTOSCOPY BLADDER STONE LITHOTRIPSY     • HYSTERECTOMY  2014    emergency post partum, Placenta accreta   • WISDOM TOOTH EXTRACTION        Social History     Tobacco Use   • Smoking status: Never   • Smokeless tobacco: Never   Vaping Use   • Vaping Use: Never used   Substance Use Topics   • Alcohol use: Yes     Comment: ocassional   • Drug use: Never     Family History   Problem Relation Age of Onset   • Hypertension Father    • Breast cancer Mother 52   • Hypertension Brother    • Melanoma Paternal Grandfather 80   • Prostate cancer  Maternal Grandfather        Medications:  Prior to Admission medications    Medication Sig Start Date End Date Taking? Authorizing Provider   escitalopram (LEXAPRO) 10 MG tablet Take 1 tablet by mouth Daily. 12/29/22   Radha Sethi APRN   busPIRone (BUSPAR) 5 MG tablet TAKE ONE TABLET BY MOUTH THREE TIMES A DAY 10/26/22   Radha Sethi APRN   metFORMIN ER (GLUCOPHAGE-XR) 500 MG 24 hr tablet Take 1 tablet by mouth Daily With Breakfast. 8/12/22   Radha Sethi APRN   Naproxen Sodium (ALEVE PO)     ProviderJoanne MD   Vitamin D, Cholecalciferol, (CHOLECALCIFEROL) 10 MCG (400 UNIT) tablet Take 500 Units by mouth Daily.    ProviderJoanne MD        Allergies:   Patient has no known allergies.    Health Maintenance Due   Topic Date Due   • COVID-19 Vaccine (1) Never done   • ANNUAL PHYSICAL  Never done         Vital Signs:   /82   Pulse 65   Temp 97.9 °F (36.6 °C)   Ht 152.4 cm (60\")   Wt 109 kg (240 lb)   SpO2 100%   BMI 46.87 kg/m²     Wt Readings from Last 3 Encounters:   01/03/23 109 kg (240 lb)   09/27/22 108 kg (239 lb)   08/16/22 108 kg (237 lb 8 oz)     BP Readings from Last 3 Encounters:   01/03/23 128/82   09/27/22 124/78   08/16/22 136/94       Class 3 Severe Obesity (BMI >=40). Obesity-related health conditions include the following: diabetes mellitus. Obesity is unchanged. BMI is is above average; BMI management plan is completed. We discussed portion control, increasing exercise and management of depression/anxiety/stress to control compensatory eating.       Physical Exam  Vitals reviewed.   Constitutional:       Appearance: Normal appearance. She is well-developed. She is morbidly obese.   HENT:      Head: Normocephalic and atraumatic.      Right Ear: External ear normal.      Left Ear: External ear normal.      Mouth/Throat:      Pharynx: No oropharyngeal exudate.   Eyes:      Conjunctiva/sclera: Conjunctivae normal.      Pupils: Pupils are equal,  round, and reactive to light.   Cardiovascular:      Rate and Rhythm: Normal rate and regular rhythm.      Heart sounds: No murmur heard.    No friction rub. No gallop.   Pulmonary:      Effort: Pulmonary effort is normal.      Breath sounds: Normal breath sounds. No wheezing or rhonchi.   Abdominal:      General: Bowel sounds are normal. There is no distension.      Palpations: Abdomen is soft.      Tenderness: There is no abdominal tenderness.   Skin:     General: Skin is warm and dry.   Neurological:      Mental Status: She is alert and oriented to person, place, and time.      Cranial Nerves: No cranial nerve deficit.   Psychiatric:         Mood and Affect: Mood and affect normal.         Behavior: Behavior normal.         Thought Content: Thought content normal.         Judgment: Judgment normal.          Result Review :    The following data was reviewed by SOY Ghosh on 01/04/23 at 08:35 EST:    Common labs    Common Labs 8/9/22 8/9/22 8/9/22 8/9/22    1653 1653 1653 1653   Glucose  114 (A)     BUN  12     Creatinine  0.72     Sodium  137     Potassium  4.1     Chloride  100     Calcium  9.1     Albumin  4.20     Total Bilirubin  0.2     Alkaline Phosphatase  62     AST (SGOT)  17     ALT (SGPT)  22     WBC 7.05      Hemoglobin 13.1      Hematocrit 39.5      Platelets 178      Total Cholesterol   186    Triglycerides   204 (A)    HDL Cholesterol   38 (A)    LDL Cholesterol    112 (A)    Hemoglobin A1C    6.20 (A)   (A) Abnormal value              No Images in the past 120 days found..                  Assessment and Plan    Diagnoses and all orders for this visit:    1. Anxiety (Primary)  -     busPIRone (BUSPAR) 5 MG tablet; Take 1 tablet by mouth 3 (Three) Times a Day.  Dispense: 90 tablet; Refill: 2    2. Depression, unspecified depression type  -     escitalopram (LEXAPRO) 10 MG tablet; Take 1 tablet by mouth Daily.  Dispense: 30 tablet; Refill: 2    3. Class 3 severe obesity due to  excess calories without serious comorbidity with body mass index (BMI) of 45.0 to 49.9 in adult (HCC)  Assessment & Plan:  Patient's (Body mass index is 46.87 kg/m².) indicates that they are morbidly/severely obese (BMI > 40 or > 35 with obesity - related health condition) with health conditions that include diabetes mellitus and dyslipidemias . Weight is unchanged. BMI is is above average; BMI management plan is completed. We discussed portion control, increasing exercise and management of depression/anxiety/stress to control compensatory eating.       4. Loss of balance  Comments:  monitor the symptoms and determine if there is any correlation to her diet or other associated activities that may be contributing to loss of balance.    5. Moderate mixed hyperlipidemia not requiring statin therapy  Comments:  Decrease intake of fatty foods, follow ADA diet.  Lipids will be reevaluated at follow-up visit.    6. Prediabetes  Comments:  Continue taking metformin as prescribed.  Decrease intake of sugar and carbohydrates, follow ADA diet.  A1c will be reevaluated at follow-up visit.     I discussed with patient possible need for head CT to rule out underlying cause of imbalance and occasional falls.  Patient will continue to monitor the symptoms and attempt to note any correlation between her diet and other possibly associated activities that may be contributing to her losing her balance.  If this continues to occur patient will be ordered CT of head.      Smoking Cessation:    Sunitha Hart Aliyah  reports that she has never smoked. She has never used smokeless tobacco.          Follow Up   Return in about 3 months (around 4/3/2023) for Next scheduled follow up.  Patient was given instructions and counseling regarding her condition or for health maintenance advice. Please see specific information pulled into the AVS if appropriate.     Please note that portions of this note were completed with a voice recognition  program.

## 2023-01-04 PROBLEM — F41.9 ANXIETY: Status: ACTIVE | Noted: 2023-01-04

## 2023-01-04 PROBLEM — F32.A DEPRESSION: Status: ACTIVE | Noted: 2023-01-04

## 2023-01-04 NOTE — ASSESSMENT & PLAN NOTE
Patient's (Body mass index is 46.87 kg/m².) indicates that they are morbidly/severely obese (BMI > 40 or > 35 with obesity - related health condition) with health conditions that include diabetes mellitus and dyslipidemias . Weight is unchanged. BMI is is above average; BMI management plan is completed. We discussed portion control, increasing exercise and management of depression/anxiety/stress to control compensatory eating.

## 2023-03-15 ENCOUNTER — TRANSCRIBE ORDERS (OUTPATIENT)
Dept: ADMINISTRATIVE | Facility: HOSPITAL | Age: 43
End: 2023-03-15
Payer: COMMERCIAL

## 2023-03-15 DIAGNOSIS — Z12.31 VISIT FOR SCREENING MAMMOGRAM: Primary | ICD-10-CM

## 2023-03-31 ENCOUNTER — OFFICE VISIT (OUTPATIENT)
Dept: FAMILY MEDICINE CLINIC | Facility: CLINIC | Age: 43
End: 2023-03-31
Payer: COMMERCIAL

## 2023-03-31 VITALS
HEIGHT: 60 IN | TEMPERATURE: 97.3 F | DIASTOLIC BLOOD PRESSURE: 82 MMHG | SYSTOLIC BLOOD PRESSURE: 126 MMHG | OXYGEN SATURATION: 99 % | HEART RATE: 72 BPM | BODY MASS INDEX: 46.51 KG/M2 | WEIGHT: 236.9 LBS

## 2023-03-31 DIAGNOSIS — F41.1 GENERALIZED ANXIETY DISORDER: ICD-10-CM

## 2023-03-31 DIAGNOSIS — F33.0 MILD EPISODE OF RECURRENT MAJOR DEPRESSIVE DISORDER: ICD-10-CM

## 2023-03-31 DIAGNOSIS — L98.9 SKIN LESION: ICD-10-CM

## 2023-03-31 DIAGNOSIS — R73.03 PREDIABETES: ICD-10-CM

## 2023-03-31 DIAGNOSIS — E78.1 HYPERTRIGLYCERIDEMIA: ICD-10-CM

## 2023-03-31 DIAGNOSIS — E78.2 MODERATE MIXED HYPERLIPIDEMIA NOT REQUIRING STATIN THERAPY: ICD-10-CM

## 2023-03-31 DIAGNOSIS — Z00.00 ANNUAL PHYSICAL EXAM: Primary | ICD-10-CM

## 2023-03-31 DIAGNOSIS — E66.01 CLASS 3 SEVERE OBESITY DUE TO EXCESS CALORIES WITHOUT SERIOUS COMORBIDITY WITH BODY MASS INDEX (BMI) OF 45.0 TO 49.9 IN ADULT: ICD-10-CM

## 2023-03-31 LAB
ALBUMIN SERPL-MCNC: 4.1 G/DL (ref 3.5–5.2)
ALBUMIN/GLOB SERPL: 1.3 G/DL
ALP SERPL-CCNC: 65 U/L (ref 39–117)
ALT SERPL W P-5'-P-CCNC: 17 U/L (ref 1–33)
ANION GAP SERPL CALCULATED.3IONS-SCNC: 10 MMOL/L (ref 5–15)
AST SERPL-CCNC: 12 U/L (ref 1–32)
BASOPHILS # BLD AUTO: 0.03 10*3/MM3 (ref 0–0.2)
BASOPHILS NFR BLD AUTO: 0.5 % (ref 0–1.5)
BILIRUB SERPL-MCNC: 0.4 MG/DL (ref 0–1.2)
BUN SERPL-MCNC: 12 MG/DL (ref 6–20)
BUN/CREAT SERPL: 15.2 (ref 7–25)
CALCIUM SPEC-SCNC: 9 MG/DL (ref 8.6–10.5)
CHLORIDE SERPL-SCNC: 103 MMOL/L (ref 98–107)
CHOLEST SERPL-MCNC: 219 MG/DL (ref 0–200)
CO2 SERPL-SCNC: 26 MMOL/L (ref 22–29)
CREAT SERPL-MCNC: 0.79 MG/DL (ref 0.57–1)
DEPRECATED RDW RBC AUTO: 39 FL (ref 37–54)
EGFRCR SERPLBLD CKD-EPI 2021: 96.5 ML/MIN/1.73
EOSINOPHIL # BLD AUTO: 0.17 10*3/MM3 (ref 0–0.4)
EOSINOPHIL NFR BLD AUTO: 3.1 % (ref 0.3–6.2)
ERYTHROCYTE [DISTWIDTH] IN BLOOD BY AUTOMATED COUNT: 13.1 % (ref 12.3–15.4)
GLOBULIN UR ELPH-MCNC: 3.1 GM/DL
GLUCOSE SERPL-MCNC: 135 MG/DL (ref 65–99)
HBA1C MFR BLD: 6 % (ref 4.8–5.6)
HCT VFR BLD AUTO: 43 % (ref 34–46.6)
HDLC SERPL-MCNC: 45 MG/DL (ref 40–60)
HGB BLD-MCNC: 14.4 G/DL (ref 12–15.9)
IMM GRANULOCYTES # BLD AUTO: 0.03 10*3/MM3 (ref 0–0.05)
IMM GRANULOCYTES NFR BLD AUTO: 0.5 % (ref 0–0.5)
LDLC SERPL CALC-MCNC: 159 MG/DL (ref 0–100)
LDLC/HDLC SERPL: 3.5 {RATIO}
LYMPHOCYTES # BLD AUTO: 1.52 10*3/MM3 (ref 0.7–3.1)
LYMPHOCYTES NFR BLD AUTO: 27.7 % (ref 19.6–45.3)
MCH RBC QN AUTO: 27.6 PG (ref 26.6–33)
MCHC RBC AUTO-ENTMCNC: 33.5 G/DL (ref 31.5–35.7)
MCV RBC AUTO: 82.5 FL (ref 79–97)
MONOCYTES # BLD AUTO: 0.41 10*3/MM3 (ref 0.1–0.9)
MONOCYTES NFR BLD AUTO: 7.5 % (ref 5–12)
NEUTROPHILS NFR BLD AUTO: 3.33 10*3/MM3 (ref 1.7–7)
NEUTROPHILS NFR BLD AUTO: 60.7 % (ref 42.7–76)
NRBC BLD AUTO-RTO: 0 /100 WBC (ref 0–0.2)
PLATELET # BLD AUTO: 183 10*3/MM3 (ref 140–450)
PMV BLD AUTO: 11 FL (ref 6–12)
POTASSIUM SERPL-SCNC: 4.3 MMOL/L (ref 3.5–5.2)
PROT SERPL-MCNC: 7.2 G/DL (ref 6–8.5)
RBC # BLD AUTO: 5.21 10*6/MM3 (ref 3.77–5.28)
SODIUM SERPL-SCNC: 139 MMOL/L (ref 136–145)
TRIGL SERPL-MCNC: 82 MG/DL (ref 0–150)
TSH SERPL DL<=0.05 MIU/L-ACNC: 1.35 UIU/ML (ref 0.27–4.2)
VLDLC SERPL-MCNC: 15 MG/DL (ref 5–40)
WBC NRBC COR # BLD: 5.49 10*3/MM3 (ref 3.4–10.8)

## 2023-03-31 PROCEDURE — 80061 LIPID PANEL: CPT

## 2023-03-31 PROCEDURE — 80050 GENERAL HEALTH PANEL: CPT

## 2023-03-31 PROCEDURE — 83036 HEMOGLOBIN GLYCOSYLATED A1C: CPT

## 2023-03-31 NOTE — ASSESSMENT & PLAN NOTE
Patient's (Body mass index is 46.27 kg/m².) indicates that they are morbidly/severely obese (BMI > 40 or > 35 with obesity - related health condition) with health conditions that include dyslipidemias . Weight is unchanged. BMI  is above average; BMI management plan is completed. We discussed portion control, increasing exercise and management of depression/anxiety/stress to control compensatory eating.

## 2023-03-31 NOTE — PROGRESS NOTES
Chief Complaint  Chief Complaint   Patient presents with   • Anxiety     3 month follow up    • Depression     3 month follow up    • Suspicious Skin Lesion     Mole on Rt side    • Annual Exam       Subjective      Sunitha Ly presents to [facility]  History of Present Illness  Patient presents today to follow-up on depression and anxiety and for annual physical.    Depression/anxiety: Stable on current medications of Lexapro 10 mg daily, BuSpar 5 mg 2-3 times daily as needed.  PHQ-9 score of 0 and ESTELA-7 score of 12.  She did recently experience the loss of her mother and this has contributed to an increase in some situational depression.  Overall patient feels that current medications are managing her symptoms although she does state that she frequently forgets to take them regularly.    Hyperlipidemia: Patient was previously found to have slightly elevated LDL cholesterol and elevated triglycerides.  She has been working on making lifestyle changes, changing her diet and increasing her physical activity.  She is morbidly obese but has lost a few pounds that she was last seen.  She has never taken a cholesterol-lowering medication in the past.    Patient has concerns of a lesion/mole on her right side.  The mole is located at the level of her bra which is causing the area to become painful and inflamed.    Patient is up-to-date on all screenings and immunizations.  Anticipatory guidelines discussed with patient including getting adequate exercise, car safety including seatbelt use, safe practices with sexual activity, continuing to abstain from smoking and alcohol use.    Objective     Medical History:  Past Medical History:   Diagnosis Date   • Abnormal Pap smear of cervix    • Arthritis    • Chlamydia    • Diabetes mellitus (HCC)    • Endometriosis    • Gonorrhea    • Kidney stone    • Migraine    • Ovarian cyst    • Placenta accreta    • Urinary tract infection   "    Past Surgical History:   Procedure Laterality Date   •  SECTION     • CHOLECYSTECTOMY     • CYSTOSCOPY BLADDER STONE LITHOTRIPSY     • HYSTERECTOMY  2014    emergency post partum, Placenta accreta   • WISDOM TOOTH EXTRACTION        Social History     Tobacco Use   • Smoking status: Never   • Smokeless tobacco: Never   Vaping Use   • Vaping Use: Never used   Substance Use Topics   • Alcohol use: Yes     Comment: ocassional   • Drug use: Never     Family History   Problem Relation Age of Onset   • Hypertension Father    • Breast cancer Mother 52   • Hypertension Brother    • Melanoma Paternal Grandfather 80   • Prostate cancer Maternal Grandfather        Medications:  Prior to Admission medications    Medication Sig Start Date End Date Taking? Authorizing Provider   busPIRone (BUSPAR) 5 MG tablet Take 1 tablet by mouth 3 (Three) Times a Day. 1/3/23   Radha Sethi APRN   escitalopram (LEXAPRO) 10 MG tablet Take 1 tablet by mouth Daily. 1/3/23   Radha Sethi APRN   metFORMIN ER (GLUCOPHAGE-XR) 500 MG 24 hr tablet Take 1 tablet by mouth Daily With Breakfast. 22   Radha Sethi APRN   Naproxen Sodium (ALEVE PO)     Provider, MD Joanne   Vitamin D, Cholecalciferol, (CHOLECALCIFEROL) 10 MCG (400 UNIT) tablet Take 500 Units by mouth Daily.    Provider, MD Joanne        Allergies:   Patient has no known allergies.    Health Maintenance Due   Topic Date Due   • ANNUAL PHYSICAL  Never done         Vital Signs:   /82   Pulse 72   Temp 97.3 °F (36.3 °C)   Ht 152.4 cm (60\")   Wt 107 kg (236 lb 14.4 oz)   SpO2 99%   BMI 46.27 kg/m²     Wt Readings from Last 3 Encounters:   23 107 kg (236 lb 14.4 oz)   23 109 kg (240 lb)   22 108 kg (239 lb)     BP Readings from Last 3 Encounters:   23 126/82   23 128/82   22 124/78     The 10-year ASCVD risk score (Gail ESPINAL, et al., 2019) is: 1%    Values used to calculate the score:      " Age: 43 years      Sex: Female      Is Non- : No      Diabetic: No      Tobacco smoker: No      Systolic Blood Pressure: 126 mmHg      Is BP treated: No      HDL Cholesterol: 45 mg/dL      Total Cholesterol: 219 mg/dL      Class 3 Severe Obesity (BMI >=40). Obesity-related health conditions include the following: dyslipidemias. Obesity is unchanged. BMI is is above average; BMI management plan is completed. We discussed portion control, increasing exercise and management of depression/anxiety/stress to control compensatory eating.       Physical Exam  Vitals reviewed.   Constitutional:       Appearance: Normal appearance. She is well-developed. She is morbidly obese.   HENT:      Head: Normocephalic and atraumatic.      Right Ear: External ear normal.      Left Ear: External ear normal.      Mouth/Throat:      Pharynx: No oropharyngeal exudate.   Eyes:      Conjunctiva/sclera: Conjunctivae normal.      Pupils: Pupils are equal, round, and reactive to light.   Cardiovascular:      Rate and Rhythm: Normal rate and regular rhythm.      Heart sounds: No murmur heard.    No friction rub. No gallop.   Pulmonary:      Effort: Pulmonary effort is normal.      Breath sounds: Normal breath sounds. No wheezing or rhonchi.   Abdominal:      General: Bowel sounds are normal. There is no distension.      Palpations: Abdomen is soft.      Tenderness: There is no abdominal tenderness.   Skin:     General: Skin is warm and dry.      Findings: Lesion present.          Neurological:      Mental Status: She is alert and oriented to person, place, and time.      Cranial Nerves: No cranial nerve deficit.   Psychiatric:         Mood and Affect: Mood and affect normal.         Behavior: Behavior normal.         Thought Content: Thought content normal.         Judgment: Judgment normal.          Result Review :    The following data was reviewed by SOY Ghosh on 03/31/23 at 10:32 EDT:    Common labs     Common Labs 8/9/22 8/9/22 8/9/22 8/9/22    1653 1653 1653 1653   Glucose  114 (A)     BUN  12     Creatinine  0.72     Sodium  137     Potassium  4.1     Chloride  100     Calcium  9.1     Albumin  4.20     Total Bilirubin  0.2     Alkaline Phosphatase  62     AST (SGOT)  17     ALT (SGPT)  22     WBC 7.05      Hemoglobin 13.1      Hematocrit 39.5      Platelets 178      Total Cholesterol   186    Triglycerides   204 (A)    HDL Cholesterol   38 (A)    LDL Cholesterol    112 (A)    Hemoglobin A1C    6.20 (A)   (A) Abnormal value              No Images in the past 120 days found..                  Assessment and Plan    Diagnoses and all orders for this visit:    1. Annual physical exam (Primary)  -     CBC & Differential  -     Comprehensive Metabolic Panel  -     Lipid Panel  -     TSH    2. Mild episode of recurrent major depressive disorder (HCC)  Assessment & Plan:  Patient's depression is recurrent and is mild without psychosis. Their depression is currently active and the condition is improving with treatment. This will be reassessed at the next regular appointment. F/U as described:patient will continue current medication therapy and patient referred to Mental Health Specialist.    Patient was provided list of local therapists.  She was encouraged to begin taking her medications more regularly as prescribed in order to experience the full intended benefit of medications.      3. Generalized anxiety disorder  Assessment & Plan:  Psychological condition is improving with treatment.  Continue current treatment regimen.  Referral to psychological counseling.  Psychological condition  will be reassessed at the next regular appointment.    Patient provided list of local therapists and encouraged to take her medication as prescribed to experience the full intended benefit.      4. Moderate mixed hyperlipidemia not requiring statin therapy  -     Lipid Panel    5. Hypertriglyceridemia  -     Lipid Panel    6.  Prediabetes  -     Hemoglobin A1c    7. Class 3 severe obesity due to excess calories without serious comorbidity with body mass index (BMI) of 45.0 to 49.9 in adult ([facility])  Assessment & Plan:  Patient's (Body mass index is 46.27 kg/m².) indicates that they are morbidly/severely obese (BMI > 40 or > 35 with obesity - related health condition) with health conditions that include dyslipidemias . Weight is unchanged. BMI  is above average; BMI management plan is completed. We discussed portion control, increasing exercise and management of depression/anxiety/stress to control compensatory eating.       8. Skin lesion     Discussed with patient importance of taking medications as prescribed to experience full benefit for treatment of depression and anxiety.  She was provided a list of local therapists so that she could find someone that she can speak with regularly, especially given the recent passing of her mother which has caused her to have some understandable grief and increase in anxiety.    Patient will return in approximately 1 month for removal of skin lesion that will be sent to pathology.  Following that visit, patient will need to schedule a 6-month follow-up.      Smoking Cessation:    Sunitha Ly  reports that she has never smoked. She has never used smokeless tobacco.          Follow Up   Return in about 1 month (around 4/30/2023) for removal of skin lesion-30 minute appt.  Patient was given instructions and counseling regarding her condition or for health maintenance advice. Please see specific information pulled into the AVS if appropriate.     Please note that portions of this note were completed with a voice recognition program.    Answers for HPI/ROS submitted by the patient on 3/24/2023  Please describe your symptoms.: Review of medications, , I’d also like to have a mole looked at on my side.  Have you had these symptoms before?: Yes  How long have you been having these symptoms?: Greater  than 2 weeks  What is the primary reason for your visit?: Other

## 2023-03-31 NOTE — ASSESSMENT & PLAN NOTE
Psychological condition is improving with treatment.  Continue current treatment regimen.  Referral to psychological counseling.  Psychological condition  will be reassessed at the next regular appointment.    Patient provided list of local therapists and encouraged to take her medication as prescribed to experience the full intended benefit.

## 2023-03-31 NOTE — ASSESSMENT & PLAN NOTE
Patient's depression is recurrent and is mild without psychosis. Their depression is currently active and the condition is improving with treatment. This will be reassessed at the next regular appointment. F/U as described:patient will continue current medication therapy and patient referred to Mental Health Specialist.    Patient was provided list of local therapists.  She was encouraged to begin taking her medications more regularly as prescribed in order to experience the full intended benefit of medications.

## 2023-05-05 ENCOUNTER — OFFICE VISIT (OUTPATIENT)
Dept: FAMILY MEDICINE CLINIC | Facility: CLINIC | Age: 43
End: 2023-05-05
Payer: COMMERCIAL

## 2023-05-05 VITALS
OXYGEN SATURATION: 98 % | HEART RATE: 71 BPM | TEMPERATURE: 98.4 F | HEIGHT: 60 IN | DIASTOLIC BLOOD PRESSURE: 80 MMHG | BODY MASS INDEX: 46.47 KG/M2 | WEIGHT: 236.7 LBS | SYSTOLIC BLOOD PRESSURE: 134 MMHG

## 2023-05-05 DIAGNOSIS — Q82.8 ACCESSORY SKIN TAGS: Primary | ICD-10-CM

## 2023-05-05 PROCEDURE — 88304 TISSUE EXAM BY PATHOLOGIST: CPT

## 2023-05-05 NOTE — PROGRESS NOTES
Chief Complaint  Chief Complaint   Patient presents with   • Suspicious Skin Lesion   • Follow-up       Subjective      Sunitha Ly presents to [facility]  History of Present Illness  Patient presents today for removal of skin lesions to her right thorax and mid back.  The areas of concern are not painful or erythematous, no drainage noted.  It is at times itchy and does bother her as it is at the edge of her bra line.  She reports that she has had similar moles removed in the past that were found to be benign.    Objective     Medical History:  Past Medical History:   Diagnosis Date   • Abnormal Pap smear of cervix    • Arthritis    • Chlamydia    • Diabetes mellitus    • Endometriosis    • Gonorrhea    • Kidney stone    • Migraine    • Ovarian cyst    • Placenta accreta    • Urinary tract infection      Past Surgical History:   Procedure Laterality Date   •  SECTION     • CHOLECYSTECTOMY     • CYSTOSCOPY BLADDER STONE LITHOTRIPSY     • HYSTERECTOMY  2014    emergency post partum, Placenta accreta   • WISDOM TOOTH EXTRACTION        Social History     Tobacco Use   • Smoking status: Never   • Smokeless tobacco: Never   Vaping Use   • Vaping Use: Never used   Substance Use Topics   • Alcohol use: Yes     Comment: ocassional   • Drug use: Never     Family History   Problem Relation Age of Onset   • Hypertension Father    • Breast cancer Mother 52   • Hypertension Brother    • Melanoma Paternal Grandfather 80   • Prostate cancer Maternal Grandfather        Medications:  Prior to Admission medications    Medication Sig Start Date End Date Taking? Authorizing Provider   busPIRone (BUSPAR) 5 MG tablet Take 1 tablet by mouth 3 (Three) Times a Day. 1/3/23   Radha Sethi APRN   escitalopram (LEXAPRO) 10 MG tablet Take 1 tablet by mouth Daily. 1/3/23   Radha Sethi APRN   metFORMIN ER (GLUCOPHAGE-XR) 500 MG 24 hr tablet Take 1 tablet by mouth Daily With  "Breakfast. 8/12/22   Radha Sethi APRN   Naproxen Sodium (ALEVE PO)     Provider, MD Joanne   Vitamin D, Cholecalciferol, (CHOLECALCIFEROL) 10 MCG (400 UNIT) tablet Take 500 Units by mouth Daily.    Provider, MD Joanne        Allergies:   Patient has no known allergies.    Health Maintenance Due   Topic Date Due   • COVID-19 Vaccine (3 - Booster for Moderna series) 12/31/2021         Vital Signs:   /80   Pulse 71   Temp 98.4 °F (36.9 °C)   Ht 152.4 cm (60\")   Wt 107 kg (236 lb 11.2 oz)   SpO2 98%   BMI 46.23 kg/m²     Wt Readings from Last 3 Encounters:   05/05/23 107 kg (236 lb 11.2 oz)   03/31/23 107 kg (236 lb 14.4 oz)   01/03/23 109 kg (240 lb)     BP Readings from Last 3 Encounters:   05/05/23 134/80   03/31/23 126/82   01/03/23 128/82              Physical Exam     Result Review :    The following data was reviewed by SOY Ghosh on 05/05/23 at 21:44 EDT:        No Images in the past 120 days found..         Skin Tag Removal    Date/Time: 5/5/2023 2:00 PM  Performed by: Radha Sethi APRN  Authorized by: Radha Sethi APRN   Preparation: Patient was prepped and draped in the usual sterile fashion.  Local anesthesia used: yes  Anesthesia: local infiltration    Anesthesia:  Local anesthesia used: yes  Local Anesthetic: lidocaine 1% with epinephrine  Anesthetic total: 1 mL    Sedation:  Patient sedated: no    Patient tolerance: patient tolerated the procedure well with no immediate complications  Comments: Skin tag removed x2.  Acrochordon from right thorax placed in formalin for pathology testing.              Assessment and Plan    Diagnoses and all orders for this visit:    1. Accessory skin tags (Primary)  -     Skin Tag Removal  -     Tissue Pathology Exam; Future  -     Tissue Pathology Exam    Risks and benefits of skin tag removal were discussed with the patient prior to beginning the procedure.  She was given opportunity to ask " questions.  Patient provided verbal consent to treatment.  Patient was placed in the seated position and she raised her right arm up to visualize and access the skin tag.  Area was cleansed with alcohol followed by injection of 1% lidocaine with epi.  About 0.5 cc were used.  It was injected with a 25-gauge 1.5 inch needle.  Afterwards a dermal blade was used and skin tag was removed at the base.  There was some bleeding noted afterwards which was controlled with electrocautery.  Afterward a Band-Aid was applied.  Patient overall tolerated procedure well was no acute distress afterwards.        Smoking Cessation:    Sunitha Ly  reports that she has never smoked. She has never used smokeless tobacco.            Follow Up   Return in about 5 months (around 10/5/2023).  Patient was given instructions and counseling regarding her condition or for health maintenance advice. Please see specific information pulled into the AVS if appropriate.     Please note that portions of this note were completed with a voice recognition program.

## 2023-05-09 LAB
CYTO UR: NORMAL
LAB AP CASE REPORT: NORMAL
LAB AP CLINICAL INFORMATION: NORMAL
PATH REPORT.FINAL DX SPEC: NORMAL
PATH REPORT.GROSS SPEC: NORMAL

## 2023-09-01 ENCOUNTER — OFFICE VISIT (OUTPATIENT)
Dept: ORTHOPEDIC SURGERY | Facility: CLINIC | Age: 43
End: 2023-09-01
Payer: COMMERCIAL

## 2023-09-01 VITALS — WEIGHT: 250 LBS | HEIGHT: 60 IN | BODY MASS INDEX: 49.08 KG/M2 | HEART RATE: 74 BPM | OXYGEN SATURATION: 98 %

## 2023-09-01 DIAGNOSIS — M25.512 LEFT SHOULDER PAIN, UNSPECIFIED CHRONICITY: Primary | ICD-10-CM

## 2023-09-01 DIAGNOSIS — M25.511 RIGHT SHOULDER PAIN, UNSPECIFIED CHRONICITY: ICD-10-CM

## 2023-09-01 NOTE — PROGRESS NOTES
"Chief Complaint  Follow-up of the Right Shoulder and Follow-up of the Left Shoulder     Subjective      Sunitha Ly presents to [facility] for follow up of bilateral shoulders. She has had pain for years.  She had a MRI 12 years ago years ago.  She has had injections in the past that has given some relief.  She has difficulty with with forward and upward ROM.  She has had no injury or fall.     No Known Allergies     Social History     Socioeconomic History   • Marital status:    Tobacco Use   • Smoking status: Never   • Smokeless tobacco: Never   Vaping Use   • Vaping Use: Never used   Substance and Sexual Activity   • Alcohol use: Yes     Comment: ocassional   • Drug use: Never   • Sexual activity: Yes     Partners: Male     Birth control/protection: None        I reviewed the patient's chief complaint, history of present illness, review of systems, past medical history, surgical history, family history, social history, medications, and allergy list.     Review of Systems     Constitutional: Denies fevers, chills, weight loss  Cardiovascular: Denies chest pain, shortness of breath  Skin: Denies rashes, acute skin changes  Neurologic: Denies headache, loss of consciousness        Vital Signs:   Pulse 74   Ht 152.4 cm (60\")   Wt 113 kg (250 lb)   SpO2 98%   BMI 48.82 kg/m²          Physical Exam  General: Alert. No acute distress    Ortho Exam        BILATERAL SHOULDERS Forward flexion 160. Abduction 100. External rotation 40. Internal rotation SI joint. Negative Cross body adduction. Supraspinatus strength 4/5. Infraspinatus Strength 4/5. Infrared subscap 4/5. Negative Hays. Negative Neer. Negative Apprehension. Negative Lift off. (Negative Obriens. Sensation intact to light touch, median, radial, ulnar nerve. Positive AIN, PIN, ulnar nerve motor. Positive pulses. Negative Impingement signs. Good strength in triceps, biceps, deltoid, wrist extensors and wrist " flexors. Positive Empty Can Test.         Large Joint Arthrocentesis: R subacromial bursa  Date/Time: 9/6/2023 12:52 PM  Consent given by: patient  Site marked: site marked  Timeout: Immediately prior to procedure a time out was called to verify the correct patient, procedure, equipment, support staff and site/side marked as required   Supporting Documentation  Indications: pain   Procedure Details  Location: shoulder - R subacromial bursa  Needle size: 22 G  Medications administered: 40 mg triamcinolone acetonide 40 MG/ML; 5 mL bupivacaine (PF) 0.5 %  Patient tolerance: patient tolerated the procedure well with no immediate complications      Large Joint Arthrocentesis: L subacromial bursa  Date/Time: 9/6/2023 12:52 PM  Consent given by: patient  Site marked: site marked  Timeout: Immediately prior to procedure a time out was called to verify the correct patient, procedure, equipment, support staff and site/side marked as required   Supporting Documentation  Indications: pain   Procedure Details  Location: shoulder - L subacromial bursa  Needle size: 22 G  Medications administered: 40 mg triamcinolone acetonide 40 MG/ML; 5 mL bupivacaine (PF) 0.5 %  Patient tolerance: patient tolerated the procedure well with no immediate complications            Imaging Results (Most Recent)       Procedure Component Value Units Date/Time    XR Scapula Right [[id number]] Resulted: 09/01/23 1608     Updated: 09/01/23 1609    XR Scapula Left [[id number]] Resulted: 09/01/23 1608     Updated: 09/01/23 1609             Result Review :     X-Ray Report:  Right scapula X-Ray  Indication: Evaluation of the right scapula  AP/Lateral view(s)  Findings: Ac joint arthritis.   Prior studies available for comparison: no     X-Ray Report:  Left scapula X-Ray  Indication: Evaluation of the left scapula  AP/Lateral view(s)  Findings: AC joint arthritis.   Prior studies available for comparison: no              Assessment and Plan     Diagnoses and  all orders for this visit:    1. Left shoulder pain, unspecified chronicity (Primary)  -     XR Scapula Left    2. Right shoulder pain, unspecified chronicity  -     XR Scapula Right        Discussed the treatment plan with the patient. I reviewed the X-rays that were obtained today with the patient.     Discussed the risks and benefits of conservative measures.  The patient expressed understanding and wished to proceed with bilateral shoulder steroid injections.  She tolerated the injection well.       Call or return if worsening symptoms.    Follow Up     4-6 weeks Discuss MRI of bilateral shoulders.        Patient was given instructions and counseling regarding her condition or for health maintenance advice. Please see specific information pulled into the AVS if appropriate.     Scribed for Ramy Lopez MD by Kimberlee Monte MA.  09/01/23   16:07 EDT    I have personally performed the services described in this document as scribed by the above individual and it is both accurate and complete. Ramy Lopez MD 09/06/23

## 2023-09-06 RX ORDER — BUPIVACAINE HYDROCHLORIDE 5 MG/ML
5 INJECTION, SOLUTION EPIDURAL; INTRACAUDAL
Status: COMPLETED | OUTPATIENT
Start: 2023-09-06 | End: 2023-09-06

## 2023-09-06 RX ORDER — TRIAMCINOLONE ACETONIDE 40 MG/ML
40 INJECTION, SUSPENSION INTRA-ARTICULAR; INTRAMUSCULAR
Status: COMPLETED | OUTPATIENT
Start: 2023-09-06 | End: 2023-09-06

## 2023-09-06 RX ADMIN — BUPIVACAINE HYDROCHLORIDE 5 ML: 5 INJECTION, SOLUTION EPIDURAL; INTRACAUDAL at 12:52

## 2023-09-06 RX ADMIN — TRIAMCINOLONE ACETONIDE 40 MG: 40 INJECTION, SUSPENSION INTRA-ARTICULAR; INTRAMUSCULAR at 12:52

## 2023-10-02 RX ORDER — METFORMIN HYDROCHLORIDE 500 MG/1
TABLET, EXTENDED RELEASE ORAL
Qty: 30 TABLET | Refills: 5 | Status: SHIPPED | OUTPATIENT
Start: 2023-10-02

## 2023-10-04 ENCOUNTER — OFFICE VISIT (OUTPATIENT)
Dept: FAMILY MEDICINE CLINIC | Facility: CLINIC | Age: 43
End: 2023-10-04
Payer: COMMERCIAL

## 2023-10-04 VITALS
OXYGEN SATURATION: 98 % | SYSTOLIC BLOOD PRESSURE: 126 MMHG | HEIGHT: 60 IN | WEIGHT: 245.5 LBS | TEMPERATURE: 98.6 F | DIASTOLIC BLOOD PRESSURE: 80 MMHG | HEART RATE: 63 BPM | BODY MASS INDEX: 48.2 KG/M2

## 2023-10-04 DIAGNOSIS — R73.03 PREDIABETES: ICD-10-CM

## 2023-10-04 DIAGNOSIS — F41.1 GENERALIZED ANXIETY DISORDER: ICD-10-CM

## 2023-10-04 DIAGNOSIS — E78.1 HYPERTRIGLYCERIDEMIA: ICD-10-CM

## 2023-10-04 DIAGNOSIS — E78.2 MODERATE MIXED HYPERLIPIDEMIA NOT REQUIRING STATIN THERAPY: ICD-10-CM

## 2023-10-04 DIAGNOSIS — F33.0 MILD EPISODE OF RECURRENT MAJOR DEPRESSIVE DISORDER: Primary | ICD-10-CM

## 2023-10-04 RX ORDER — BUPROPION HYDROCHLORIDE 150 MG/1
150 TABLET ORAL DAILY
Qty: 30 TABLET | Refills: 2 | Status: SHIPPED | OUTPATIENT
Start: 2023-10-04

## 2023-10-04 NOTE — PROGRESS NOTES
Chief Complaint  Chief Complaint   Patient presents with    Follow-up    Anxiety    Depression       Subjective      Sunitha Ly presents to [facility]  The patient presents to follow up on anxiety, depression, hyperlipidemia, and prediabetes.    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 She is taking Lexapro 10 mg daily. She has improved in taking her BuSpar 5 mg 3 times daily; however, she is still missing doses. She notes that she is having difficulty focusing on a single task as she tends to try to do multiple tasks at once. She has tried writing lists without benefit. She  has not tried Wellbutrin.    She had an unexpected weight gain of 10 pounds over the summer of . She denies any major dietary changes and she has been making an effort at decreasing the amount of sugar in her diet. She has lost 5 pounds since her most recent visit in clinic.    She is not regularly taking her vitamin D supplement. She declines the influenza vaccination.    Objective     Medical History:  Past Medical History:   Diagnosis Date    Abnormal Pap smear of cervix     Arthritis     Chlamydia     Diabetes mellitus     Endometriosis     Gonorrhea     Kidney stone     Migraine     Ovarian cyst     Placenta accreta     Urinary tract infection      Past Surgical History:   Procedure Laterality Date     SECTION      CHOLECYSTECTOMY      CYSTOSCOPY BLADDER STONE LITHOTRIPSY      HYSTERECTOMY      emergency post partum, Placenta accreta    WISDOM TOOTH EXTRACTION        Social History     Tobacco Use    Smoking status: Never    Smokeless tobacco: Never   Vaping Use    Vaping Use: Never used   Substance Use Topics    Alcohol use: Yes     Comment: ocassional    Drug use: Never     Family History   Problem Relation Age of Onset    Hypertension Father     Breast cancer Mother 52    Hypertension Brother     Melanoma Paternal Grandfather  "80    Prostate cancer Maternal Grandfather        Medications:  Prior to Admission medications    Medication Sig Start Date End Date Taking? Authorizing Provider   busPIRone (BUSPAR) 5 MG tablet Take 1 tablet by mouth 3 (Three) Times a Day. 1/3/23  Yes Radha Sethi APRN   escitalopram (LEXAPRO) 10 MG tablet Take 1 tablet by mouth Daily. 1/3/23  Yes Radha Sethi APRN   metFORMIN ER (GLUCOPHAGE-XR) 500 MG 24 hr tablet TAKE ONE TABLET BY MOUTH DAILY WITH BREAKFAST 10/2/23  Yes Radha Sethi APRN   Naproxen Sodium (ALEVE PO)    Yes ProviderJoanne MD   Vitamin D, Cholecalciferol, (CHOLECALCIFEROL) 10 MCG (400 UNIT) tablet Take 500 Units by mouth Daily.   Yes Joanne Hall MD        Allergies:   Patient has no known allergies.    Health Maintenance Due   Topic Date Due    INFLUENZA VACCINE  Never done    COVID-19 Vaccine (3 - 2023-24 season) 09/01/2023         Vital Signs:   /80   Pulse 63   Temp 98.6 °F (37 °C)   Ht 152.4 cm (60\")   Wt 111 kg (245 lb 8 oz)   SpO2 98%   BMI 47.95 kg/m²     Wt Readings from Last 3 Encounters:   10/04/23 111 kg (245 lb 8 oz)   09/01/23 113 kg (250 lb)   05/05/23 107 kg (236 lb 11.2 oz)     BP Readings from Last 3 Encounters:   10/04/23 126/80   05/05/23 134/80   03/31/23 126/82       Physical Exam  Vitals reviewed.   Constitutional:       Appearance: Normal appearance. She is well-developed. She is morbidly obese.   HENT:      Head: Normocephalic and atraumatic.   Eyes:      Conjunctiva/sclera: Conjunctivae normal.      Pupils: Pupils are equal, round, and reactive to light.   Cardiovascular:      Rate and Rhythm: Normal rate and regular rhythm.      Heart sounds: No murmur heard.    No friction rub. No gallop.   Pulmonary:      Effort: Pulmonary effort is normal.      Breath sounds: Normal breath sounds. No wheezing or rhonchi.   Abdominal:      General: Bowel sounds are normal. There is no distension.      Palpations: Abdomen is " soft.      Tenderness: There is no abdominal tenderness.   Skin:     General: Skin is warm and dry.   Neurological:      Mental Status: She is alert and oriented to person, place, and time.      Cranial Nerves: No cranial nerve deficit.   Psychiatric:         Mood and Affect: Mood and affect normal.         Behavior: Behavior normal.         Thought Content: Thought content normal.         Judgment: Judgment normal.       Result Review :    The following data was reviewed by SOY Ghosh on 10/04/23 at 16:17 EDT:    Common labs          3/31/2023    10:11   Common Labs   Glucose 135    BUN 12    Creatinine 0.79    Sodium 139    Potassium 4.3    Chloride 103    Calcium 9.0    Albumin 4.1    Total Bilirubin 0.4    Alkaline Phosphatase 65    AST (SGOT) 12    ALT (SGPT) 17    WBC 5.49    Hemoglobin 14.4    Hematocrit 43.0    Platelets 183    Total Cholesterol 219    Triglycerides 82    HDL Cholesterol 45    LDL Cholesterol  159    Hemoglobin A1C 6.00        Mammo Screening Digital Tomosynthesis Bilateral With CAD    Result Date: 6/20/2023   Benign mammogram. Suggest routine mammographic screening.  RECOMMENDATION(S):  ROUTINE MAMMOGRAM AND CLINICAL EVALUATION IN 12 MONTHS.   BIRADS:  DIAGNOSTIC CATEGORY 1--NEGATIVE.   BREAST COMPOSITION: Scattered areas fibroglandular density.  PLEASE NOTE:  A NORMAL MAMMOGRAM DOES NOT EXCLUDE THE POSSIBILITY OF BREAST CANCER. ANY CLINICALLY SUSPICIOUS PALPABLE LUMP SHOULD BE BIOPSIED.      DEB BAILEY MD       Electronically Signed and Approved By: DEB BAILEY MD on 6/20/2023 at 8:16                        Assessment and Plan    Diagnoses and all orders for this visit:    1. Mild episode of recurrent major depressive disorder (Primary)  -     buPROPion XL (Wellbutrin XL) 150 MG 24 hr tablet; Take 1 tablet by mouth Daily.  Dispense: 30 tablet; Refill: 2    2. Generalized anxiety disorder  -     buPROPion XL (Wellbutrin XL) 150 MG 24 hr tablet; Take 1 tablet by mouth  Daily.  Dispense: 30 tablet; Refill: 2    3. Moderate mixed hyperlipidemia not requiring statin therapy    4. Hypertriglyceridemia    5. Prediabetes         Mild episode of recurrent major depressive disorder  The patient will initiate Wellbutrin 150 mg once daily. She will continue Lexapro 10 mg.    Anxiety  She was encouraged to continue BuSpar 5 mg 3 times daily.    Moderate mixed hyperlipidemia not requiring statin therapy  The patient was encouraged to continue efforts at improving her diet and increasing her exercise. She will return for routine laboratory studies when she has her annual examination in 03/2023.    She will follow-up with me in 3 months to continue monitoring depression and anxiety.        Follow Up   Return in about 3 months (around 1/4/2024) for Next scheduled follow up.  Patient was given instructions and counseling regarding her condition or for health maintenance advice. Please see specific information pulled into the AVS if appropriate.     Please note that portions of this note were completed with a voice recognition program.    Transcribed from ambient dictation for SOY Ghosh by Forrest Rowe.  10/04/23   15:48 EDT    Patient or patient representative verbalized consent to the visit recording.  I have personally performed the services described in this document as transcribed by the above individual, and it is both accurate and complete.

## 2023-10-13 ENCOUNTER — OFFICE VISIT (OUTPATIENT)
Dept: ORTHOPEDIC SURGERY | Facility: CLINIC | Age: 43
End: 2023-10-13
Payer: COMMERCIAL

## 2023-10-13 VITALS
DIASTOLIC BLOOD PRESSURE: 84 MMHG | HEIGHT: 60 IN | BODY MASS INDEX: 48.1 KG/M2 | HEART RATE: 64 BPM | SYSTOLIC BLOOD PRESSURE: 125 MMHG | WEIGHT: 245 LBS | OXYGEN SATURATION: 94 %

## 2023-10-13 DIAGNOSIS — G89.29 CHRONIC LEFT SHOULDER PAIN: Primary | ICD-10-CM

## 2023-10-13 DIAGNOSIS — M25.511 CHRONIC RIGHT SHOULDER PAIN: ICD-10-CM

## 2023-10-13 DIAGNOSIS — M25.512 CHRONIC LEFT SHOULDER PAIN: Primary | ICD-10-CM

## 2023-10-13 DIAGNOSIS — G89.29 CHRONIC RIGHT SHOULDER PAIN: ICD-10-CM

## 2023-10-13 PROCEDURE — 99213 OFFICE O/P EST LOW 20 MIN: CPT

## 2023-10-16 NOTE — PATIENT INSTRUCTIONS
Discussed treatment plan of care with patient.  We discussed repeat steroid injections in 6 weeks.  We also discussed physical therapy.  Patient would like to try physical therapy.  Order was placed in office today.    Follow-up in 6 weeks to evaluate progress with therapy and consider repeat steroid injections.  Call for questions, concerns or worsening symptoms.

## 2023-10-16 NOTE — PROGRESS NOTES
"Chief Complaint  Follow-up of the Left Shoulder and Follow-up of the Right Shoulder    Subjective      Sunitha Ly presents to [facility] for follow-up of bilateral shoulder pain.  Patient had bilateral shoulder injections on 9/1/2023 and reports that she feels at least 70% better.  She is no longer experiencing the sharp pains that she was before.  She does feel that the bilateral shoulders feel heavy/tired with activities.  Otherwise she is doing great.    Objective   No Known Allergies    Vital Signs:   /84   Pulse 64   Ht 152.4 cm (60\")   Wt 111 kg (245 lb)   SpO2 94%   BMI 47.85 kg/m²       Physical Exam    Constitutional: Awake, alert. Well nourished appearance.    Integumentary: Warm, dry, intact. No obvious rashes.    HENT: Atraumatic, normocephalic.   Respiratory: Non labored respirations .   Cardiovascular: Intact peripheral pulses.    Psychiatric: Normal mood and affect. A&O X3    Ortho Exam  Left shoulder range of motion active forward flexion to 160, abduction 90, internal rotation to L5, external rotation 45 degrees.  Positive impingement sign.  Full range of motion of the elbow and the wrist.    Left shoulder range of motion active forward shoulder flexion to 175, abduction 110, internal rotation to T12, external rotation 60 degrees.  Negative impingement sign.  Full range of motion of the elbow and the wrist.    Imaging Results (Most Recent)       None                      Assessment and Plan   Problem List Items Addressed This Visit    None  Visit Diagnoses       Chronic left shoulder pain    -  Primary    Relevant Orders    Ambulatory Referral to Physical Therapy Evaluate and treat; Stretching, ROM, Strengthening (Completed)    Chronic right shoulder pain        Relevant Orders    Ambulatory Referral to Physical Therapy Evaluate and treat; Stretching, ROM, Strengthening (Completed)            Follow Up   Return in about 6 weeks (around " 11/24/2023).    Patient is a non-smoker, did not discuss options for smoking cessation.     Social History     Socioeconomic History    Marital status:    Tobacco Use    Smoking status: Never    Smokeless tobacco: Never   Vaping Use    Vaping Use: Never used   Substance and Sexual Activity    Alcohol use: Yes     Comment: ocassional    Drug use: Never    Sexual activity: Yes     Partners: Male     Birth control/protection: None       Patient Instructions   Discussed treatment plan of care with patient.  We discussed repeat steroid injections in 6 weeks.  We also discussed physical therapy.  Patient would like to try physical therapy.  Order was placed in office today.    Follow-up in 6 weeks to evaluate progress with therapy and consider repeat steroid injections.  Call for questions, concerns or worsening symptoms.  Patient was given instructions and counseling regarding her condition or for health maintenance advice. Please see specific information pulled into the AVS if appropriate.

## 2023-11-27 ENCOUNTER — OFFICE VISIT (OUTPATIENT)
Dept: ORTHOPEDIC SURGERY | Facility: CLINIC | Age: 43
End: 2023-11-27
Payer: COMMERCIAL

## 2023-11-27 VITALS — WEIGHT: 245 LBS | HEIGHT: 60 IN | BODY MASS INDEX: 48.1 KG/M2

## 2023-11-27 DIAGNOSIS — M25.511 CHRONIC PAIN OF BOTH SHOULDERS: Primary | ICD-10-CM

## 2023-11-27 DIAGNOSIS — M25.512 CHRONIC PAIN OF BOTH SHOULDERS: Primary | ICD-10-CM

## 2023-11-27 DIAGNOSIS — G89.29 CHRONIC PAIN OF BOTH SHOULDERS: Primary | ICD-10-CM

## 2023-11-28 NOTE — PROGRESS NOTES
"Chief Complaint  Follow-up of the Left Shoulder and Follow-up of the Right Shoulder    Subjective      Sunitha Ly presents to [facility] for bilateral shoulder pain.  Patient has had injections in the past, her last one being on 9/1/2023 and reports that she feels at least 70% better with that injection but the pain has since returned.  She reports that she has been doing physical therapy and has noticed some improvement especially with the taping, however over the holiday weekend she noticed increased pain, both of the shoulders felt very tight and heavy and was very aching.  She said the pain varies day per day.    Objective   No Known Allergies    Vital Signs:   Ht 152.4 cm (60\")   Wt 111 kg (245 lb)   BMI 47.85 kg/m²       Physical Exam    Constitutional: Awake, alert. Well nourished appearance.    Integumentary: Warm, dry, intact. No obvious rashes.    HENT: Atraumatic, normocephalic.   Respiratory: Non labored respirations .   Cardiovascular: Intact peripheral pulses.    Psychiatric: Normal mood and affect. A&O X3    Ortho Exam  Left shoulder: Active range of motion forward shoulder flexion 180 degrees, abduction 160, internal rotation T12, external rotation 90 degrees.  Negative impingement sign.  Neurovascular intact.  Sensation is intact.    Right shoulder: Active range of motion forward shoulder flexion 160 degrees, abduction 110 degrees, internal rotation T12, external rotation 90 degrees.  Negative impingement sign.  Neurovascular intact.  Sensation is intact.    Imaging Results (Most Recent)       None                      Assessment and Plan   Problem List Items Addressed This Visit    None  Visit Diagnoses       Chronic pain of both shoulders    -  Primary    Relevant Orders    MRI Shoulder Left Without Contrast    MRI Shoulder Right Without Contrast            Follow Up   Return for Recheck.    Patient is a non-smoker, did not discuss options for smoking " cessation.     Social History     Socioeconomic History    Marital status:    Tobacco Use    Smoking status: Never    Smokeless tobacco: Never   Vaping Use    Vaping Use: Never used   Substance and Sexual Activity    Alcohol use: Yes     Comment: ocassional    Drug use: Never    Sexual activity: Yes     Partners: Male     Birth control/protection: None       Patient Instructions   Discussed treatment plan of care with patient.  We discussed getting injections today versus getting an MRI.  Patient would like to get an MRI.  Instructed to continue home exercises until we get the MRI result back.  Depending on the results of the MRI, if she needs surgery then she would need to save her physical therapy visits until after the surgery.    Patient to follow-up after MRI.  Call for questions, concerns or worsening symptoms.  Patient was given instructions and counseling regarding her condition or for health maintenance advice. Please see specific information pulled into the AVS if appropriate.

## 2023-11-28 NOTE — PATIENT INSTRUCTIONS
Discussed treatment plan of care with patient.  We discussed getting injections today versus getting an MRI.  Patient would like to get an MRI.  Instructed to continue home exercises until we get the MRI result back.  Depending on the results of the MRI, if she needs surgery then she would need to save her physical therapy visits until after the surgery.    Patient to follow-up after MRI.  Call for questions, concerns or worsening symptoms.

## 2023-12-01 ENCOUNTER — HOSPITAL ENCOUNTER (EMERGENCY)
Facility: HOSPITAL | Age: 43
Discharge: HOME | End: 2023-12-01
Attending: EMERGENCY MEDICINE
Payer: COMMERCIAL

## 2023-12-01 ENCOUNTER — APPOINTMENT (OUTPATIENT)
Dept: ULTRASOUND IMAGING | Facility: HOSPITAL | Age: 43
End: 2023-12-01
Payer: COMMERCIAL

## 2023-12-01 ENCOUNTER — APPOINTMENT (OUTPATIENT)
Dept: CT IMAGING | Facility: HOSPITAL | Age: 43
End: 2023-12-01
Payer: COMMERCIAL

## 2023-12-01 VITALS
WEIGHT: 240.3 LBS | DIASTOLIC BLOOD PRESSURE: 69 MMHG | TEMPERATURE: 98.2 F | HEIGHT: 60 IN | RESPIRATION RATE: 17 BRPM | OXYGEN SATURATION: 96 % | HEART RATE: 60 BPM | BODY MASS INDEX: 47.18 KG/M2 | SYSTOLIC BLOOD PRESSURE: 108 MMHG

## 2023-12-01 DIAGNOSIS — R10.32 LEFT LOWER QUADRANT ABDOMINAL PAIN: ICD-10-CM

## 2023-12-01 DIAGNOSIS — N83.202 LEFT OVARIAN CYST: Primary | ICD-10-CM

## 2023-12-01 LAB
ALBUMIN SERPL-MCNC: 4.2 G/DL (ref 3.5–5.2)
ALBUMIN/GLOB SERPL: 1.5 G/DL
ALP SERPL-CCNC: 66 U/L (ref 39–117)
ALT SERPL W P-5'-P-CCNC: 26 U/L (ref 1–33)
ANION GAP SERPL CALCULATED.3IONS-SCNC: 10.7 MMOL/L (ref 5–15)
AST SERPL-CCNC: 15 U/L (ref 1–32)
BASOPHILS # BLD AUTO: 0.04 10*3/MM3 (ref 0–0.2)
BASOPHILS NFR BLD AUTO: 0.7 % (ref 0–1.5)
BILIRUB SERPL-MCNC: 0.4 MG/DL (ref 0–1.2)
BILIRUB UR QL STRIP: NEGATIVE
BUN SERPL-MCNC: 10 MG/DL (ref 6–20)
BUN/CREAT SERPL: 13.7 (ref 7–25)
CALCIUM SPEC-SCNC: 9.1 MG/DL (ref 8.6–10.5)
CHLORIDE SERPL-SCNC: 103 MMOL/L (ref 98–107)
CLARITY UR: CLEAR
CO2 SERPL-SCNC: 25.3 MMOL/L (ref 22–29)
COLOR UR: YELLOW
CREAT SERPL-MCNC: 0.73 MG/DL (ref 0.57–1)
DEPRECATED RDW RBC AUTO: 38.5 FL (ref 37–54)
EGFRCR SERPLBLD CKD-EPI 2021: 106.1 ML/MIN/1.73
EOSINOPHIL # BLD AUTO: 0.16 10*3/MM3 (ref 0–0.4)
EOSINOPHIL NFR BLD AUTO: 2.9 % (ref 0.3–6.2)
ERYTHROCYTE [DISTWIDTH] IN BLOOD BY AUTOMATED COUNT: 12.9 % (ref 12.3–15.4)
GLOBULIN UR ELPH-MCNC: 2.8 GM/DL
GLUCOSE SERPL-MCNC: 118 MG/DL (ref 65–99)
GLUCOSE UR STRIP-MCNC: NEGATIVE MG/DL
HCT VFR BLD AUTO: 41.5 % (ref 34–46.6)
HGB BLD-MCNC: 13.9 G/DL (ref 12–15.9)
HGB UR QL STRIP.AUTO: NEGATIVE
HOLD SPECIMEN: NORMAL
HOLD SPECIMEN: NORMAL
IMM GRANULOCYTES # BLD AUTO: 0.03 10*3/MM3 (ref 0–0.05)
IMM GRANULOCYTES NFR BLD AUTO: 0.5 % (ref 0–0.5)
KETONES UR QL STRIP: NEGATIVE
LEUKOCYTE ESTERASE UR QL STRIP.AUTO: NEGATIVE
LIPASE SERPL-CCNC: 19 U/L (ref 13–60)
LYMPHOCYTES # BLD AUTO: 1.45 10*3/MM3 (ref 0.7–3.1)
LYMPHOCYTES NFR BLD AUTO: 26.3 % (ref 19.6–45.3)
MCH RBC QN AUTO: 27.7 PG (ref 26.6–33)
MCHC RBC AUTO-ENTMCNC: 33.5 G/DL (ref 31.5–35.7)
MCV RBC AUTO: 82.8 FL (ref 79–97)
MONOCYTES # BLD AUTO: 0.38 10*3/MM3 (ref 0.1–0.9)
MONOCYTES NFR BLD AUTO: 6.9 % (ref 5–12)
NEUTROPHILS NFR BLD AUTO: 3.45 10*3/MM3 (ref 1.7–7)
NEUTROPHILS NFR BLD AUTO: 62.7 % (ref 42.7–76)
NITRITE UR QL STRIP: NEGATIVE
NRBC BLD AUTO-RTO: 0 /100 WBC (ref 0–0.2)
PH UR STRIP.AUTO: 7 [PH] (ref 5–8)
PLATELET # BLD AUTO: 162 10*3/MM3 (ref 140–450)
PMV BLD AUTO: 10.1 FL (ref 6–12)
POTASSIUM SERPL-SCNC: 3.9 MMOL/L (ref 3.5–5.2)
PROT SERPL-MCNC: 7 G/DL (ref 6–8.5)
PROT UR QL STRIP: NEGATIVE
RBC # BLD AUTO: 5.01 10*6/MM3 (ref 3.77–5.28)
SODIUM SERPL-SCNC: 139 MMOL/L (ref 136–145)
SP GR UR STRIP: 1.01 (ref 1–1.03)
UROBILINOGEN UR QL STRIP: NORMAL
WBC NRBC COR # BLD AUTO: 5.51 10*3/MM3 (ref 3.4–10.8)
WHOLE BLOOD HOLD COAG: NORMAL
WHOLE BLOOD HOLD SPECIMEN: NORMAL

## 2023-12-01 PROCEDURE — 85025 COMPLETE CBC W/AUTO DIFF WBC: CPT

## 2023-12-01 PROCEDURE — 80053 COMPREHEN METABOLIC PANEL: CPT

## 2023-12-01 PROCEDURE — 99285 EMERGENCY DEPT VISIT HI MDM: CPT

## 2023-12-01 PROCEDURE — 25010000002 HYDROMORPHONE 1 MG/ML SOLUTION: Performed by: EMERGENCY MEDICINE

## 2023-12-01 PROCEDURE — 83690 ASSAY OF LIPASE: CPT

## 2023-12-01 PROCEDURE — 81003 URINALYSIS AUTO W/O SCOPE: CPT | Performed by: EMERGENCY MEDICINE

## 2023-12-01 PROCEDURE — 25010000002 KETOROLAC TROMETHAMINE PER 15 MG: Performed by: EMERGENCY MEDICINE

## 2023-12-01 PROCEDURE — 96375 TX/PRO/DX INJ NEW DRUG ADDON: CPT

## 2023-12-01 PROCEDURE — 74177 CT ABD & PELVIS W/CONTRAST: CPT

## 2023-12-01 PROCEDURE — 96374 THER/PROPH/DIAG INJ IV PUSH: CPT

## 2023-12-01 PROCEDURE — 76830 TRANSVAGINAL US NON-OB: CPT

## 2023-12-01 PROCEDURE — 25510000001 IOPAMIDOL PER 1 ML: Performed by: EMERGENCY MEDICINE

## 2023-12-01 RX ORDER — HYDROCODONE BITARTRATE AND ACETAMINOPHEN 7.5; 325 MG/1; MG/1
1 TABLET ORAL EVERY 6 HOURS PRN
Qty: 20 TABLET | Refills: 0 | Status: SHIPPED | OUTPATIENT
Start: 2023-12-01

## 2023-12-01 RX ORDER — KETOROLAC TROMETHAMINE 30 MG/ML
30 INJECTION, SOLUTION INTRAMUSCULAR; INTRAVENOUS ONCE
Status: COMPLETED | OUTPATIENT
Start: 2023-12-01 | End: 2023-12-01

## 2023-12-01 RX ORDER — SODIUM CHLORIDE 0.9 % (FLUSH) 0.9 %
10 SYRINGE (ML) INJECTION AS NEEDED
Status: DISCONTINUED | OUTPATIENT
Start: 2023-12-01 | End: 2023-12-01 | Stop reason: HOSPADM

## 2023-12-01 RX ADMIN — IOPAMIDOL 100 ML: 755 INJECTION, SOLUTION INTRAVENOUS at 13:30

## 2023-12-01 RX ADMIN — HYDROMORPHONE HYDROCHLORIDE 0.5 MG: 1 INJECTION, SOLUTION INTRAMUSCULAR; INTRAVENOUS; SUBCUTANEOUS at 15:35

## 2023-12-01 RX ADMIN — KETOROLAC TROMETHAMINE 30 MG: 30 INJECTION, SOLUTION INTRAMUSCULAR; INTRAVENOUS at 14:00

## 2023-12-01 NOTE — ED PROVIDER NOTES
Time: 4:01 PM EST  Date of encounter:  2023  Independent Historian/Clinical History and Information was obtained by:   {Blank multiple:58386}  Chief Complaint: ***    History is limited by: {Limited History:31070}    History of Present Illness:  Patient is a 43 y.o. year old female who presents to the emergency department for evaluation of ***    HPI    Patient Care Team  Primary Care Provider: Radha Sethi APRN    Past Medical History:     No Known Allergies  Past Medical History:   Diagnosis Date   • Abnormal Pap smear of cervix    • Arthritis    • Chlamydia    • Diabetes mellitus    • Endometriosis    • Gonorrhea    • Kidney stone    • Migraine    • Ovarian cyst    • Placenta accreta    • Urinary tract infection      Past Surgical History:   Procedure Laterality Date   •  SECTION      3   • CHOLECYSTECTOMY     • CYSTOSCOPY BLADDER STONE LITHOTRIPSY     • HYSTERECTOMY  2014    emergency post partum, Placenta accreta   • WISDOM TOOTH EXTRACTION       Family History   Problem Relation Age of Onset   • Hypertension Father    • Breast cancer Mother 52   • Hypertension Brother    • Melanoma Paternal Grandfather 80   • Prostate cancer Maternal Grandfather        Home Medications:  Prior to Admission medications    Medication Sig Start Date End Date Taking? Authorizing Provider   buPROPion XL (Wellbutrin XL) 150 MG 24 hr tablet Take 1 tablet by mouth Daily. 10/4/23   Radha Sethi APRN   busPIRone (BUSPAR) 5 MG tablet Take 1 tablet by mouth 3 (Three) Times a Day. 1/3/23   Radha Sethi APRN   escitalopram (LEXAPRO) 10 MG tablet Take 1 tablet by mouth Daily. 1/3/23   Radha Sethi APRN   metFORMIN ER (GLUCOPHAGE-XR) 500 MG 24 hr tablet TAKE ONE TABLET BY MOUTH DAILY WITH BREAKFAST 10/2/23   Radha Sethi APRN   Naproxen Sodium (ALEVE PO)     Provider, MD Joanne   Vitamin D, Cholecalciferol, (CHOLECALCIFEROL) 10 MCG (400 UNIT) tablet Take 500 Units by  "mouth Daily.    Provider, Joanne, MD        Social History:   Social History     Tobacco Use   • Smoking status: Never   • Smokeless tobacco: Never   Vaping Use   • Vaping Use: Never used   Substance Use Topics   • Alcohol use: Yes     Comment: ocassional   • Drug use: Never         Review of Systems:  Review of Systems     ***    Physical Exam:  /69   Pulse 60   Temp 98.2 °F (36.8 °C) (Oral)   Resp 17   Ht 152.4 cm (60\")   Wt 109 kg (240 lb 4.8 oz)   LMP 01/14/2014 (LMP Unknown)   SpO2 96%   BMI 46.93 kg/m²     Physical Exam    Vital signs were reviewed under triage note.  General appearance - Patient appears well-developed and well-nourished.  Patient is in no acute distress.  Head - Normocephalic, atraumatic.  Pupils - Equal, round, reactive to light.  Extraocular muscles are intact.  Conjunctiva is clear.  Nasal - Normal inspection.  No evidence of trauma or epistaxis.  Tympanic membranes - Gray, intact without erythema or retractions.  Oral mucosa - Pink and moist without lesions or erythema.  Uvula is midline.  Chest wall - Atraumatic.  Chest wall is nontender.  There are no vesicular rashes noted.  Neck - Supple.  Trachea was midline.  There is no palpable lymphadenopathy or thyromegaly.  There are no meningeal signs  Lungs - Clear to auscultation and percussion bilaterally.  Heart - Regular rate and rhythm without any murmurs, clicks, or gallops.  Abdomen - Soft.  Bowel sounds are present.  There is no palpable tenderness.  There is no rebound, guarding, or rigidity.  There are no palpable masses.  There are no pulsatile masses.  Back - Spine is straight and midline.  There is no CVA tenderness.  Extremities - Intact x4 with full range of motion.  There is no palpable edema.  Pulses are intact x4 and equal.  Neurologic - Patient is awake, alert, and oriented x3.  Cranial nerves II through XII are grossly intact.  Motor and sensory functions grossly intact.  Cerebellar function was " normal.  Integument - There are no rashes.  There are no petechia or purpura lesions noted.  There are no vesicular lesions noted.           ***    Procedures:  Procedures      Medical Decision Making:      Comorbidities that affect care:    {Comorbidities that affect care:44667}    External Notes reviewed:    {External Note review (Optional):68371}      The following orders were placed and all results were independently analyzed by me:  Orders Placed This Encounter   Procedures   • CT Abdomen Pelvis With Contrast   • US Non-ob Transvaginal   • [place] Draw   • Comprehensive Metabolic Panel   • Lipase   • Urinalysis With Microscopic If Indicated (No Culture) - Urine, Clean Catch   • CBC Auto Differential   • NPO Diet NPO Type: Strict NPO   • Undress & Gown   • Insert Peripheral IV   • CBC & Differential   • Green Top (Gel)   • Lavender Top   • Gold Top - SST   • Light Blue Top       Medications Given in the Emergency Department:  Medications   sodium chloride 0.9 % flush 10 mL (has no administration in time range)   ketorolac (TORADOL) injection 30 mg (30 mg Intravenous Given 12/1/23 1400)   iopamidol (ISOVUE-370) 76 % injection 100 mL (100 mL Intravenous Given 12/1/23 1330)   HYDROmorphone (DILAUDID) injection 0.5 mg (0.5 mg Intravenous Given 12/1/23 1535)        ED Course:         ***    Labs:    Lab Results (last 24 hours)       Procedure Component Value Units Date/Time    CBC & Differential [[id number]]  (Normal) Collected: 12/01/23 1136    Specimen: Blood from Arm, Right Updated: 12/01/23 1143    Narrative:      The following orders were created for panel order CBC & Differential.  Procedure                               Abnormality         Status                     ---------                               -----------         ------                     CBC Auto Differential[[id number]]        Normal              Final result                 Please view results for these tests on the individual orders.     Comprehensive Metabolic Panel [[id number]]  (Abnormal) Collected: 12/01/23 1136    Specimen: Blood from Arm, Right Updated: 12/01/23 1200     Glucose 118 mg/dL      BUN 10 mg/dL      Creatinine 0.73 mg/dL      Sodium 139 mmol/L      Potassium 3.9 mmol/L      Chloride 103 mmol/L      CO2 25.3 mmol/L      Calcium 9.1 mg/dL      Total Protein 7.0 g/dL      Albumin 4.2 g/dL      ALT (SGPT) 26 U/L      AST (SGOT) 15 U/L      Alkaline Phosphatase 66 U/L      Total Bilirubin 0.4 mg/dL      Globulin 2.8 gm/dL      A/G Ratio 1.5 g/dL      BUN/Creatinine Ratio 13.7     Anion Gap 10.7 mmol/L      eGFR 106.1 mL/min/1.73     Narrative:      GFR Normal >60  Chronic Kidney Disease <60  Kidney Failure <15      Lipase [[id number]]  (Normal) Collected: 12/01/23 1136    Specimen: Blood from Arm, Right Updated: 12/01/23 1200     Lipase 19 U/L     CBC Auto Differential [[id number]]  (Normal) Collected: 12/01/23 1136    Specimen: Blood from Arm, Right Updated: 12/01/23 1143     WBC 5.51 10*3/mm3      RBC 5.01 10*6/mm3      Hemoglobin 13.9 g/dL      Hematocrit 41.5 %      MCV 82.8 fL      MCH 27.7 pg      MCHC 33.5 g/dL      RDW 12.9 %      RDW-SD 38.5 fl      MPV 10.1 fL      Platelets 162 10*3/mm3      Neutrophil % 62.7 %      Lymphocyte % 26.3 %      Monocyte % 6.9 %      Eosinophil % 2.9 %      Basophil % 0.7 %      Immature Grans % 0.5 %      Neutrophils, Absolute 3.45 10*3/mm3      Lymphocytes, Absolute 1.45 10*3/mm3      Monocytes, Absolute 0.38 10*3/mm3      Eosinophils, Absolute 0.16 10*3/mm3      Basophils, Absolute 0.04 10*3/mm3      Immature Grans, Absolute 0.03 10*3/mm3      nRBC 0.0 /100 WBC     Urinalysis With Microscopic If Indicated (No Culture) - Urine, Clean Catch [[id number]]  (Normal) Collected: 12/01/23 1256    Specimen: Urine, Clean Catch Updated: 12/01/23 1310     Color, UA Yellow     Appearance, UA Clear     pH, UA 7.0     Specific Gravity, UA 1.011     Glucose, UA Negative     Ketones, UA Negative     Bilirubin, UA  Negative     Blood, UA Negative     Protein, UA Negative     Leuk Esterase, UA Negative     Nitrite, UA Negative     Urobilinogen, UA 1.0 E.U./dL    Narrative:      Urine microscopic not indicated.             Imaging:    CT Abdomen Pelvis With Contrast    Result Date: 12/1/2023  PROCEDURE: CT ABDOMEN PELVIS W CONTRAST  COMPARISON: [facility], CT, ABDMEN/PELVIS WITH CONTRAST, 12/15/2011, 10:25.  [facility], CT, CT ABDOMEN PELVIS WO CONTRAST, 10/28/2021, 10:47.  INDICATIONS: Left lower quadrant abdominal pain  TECHNIQUE: After obtaining the patient's consent, CT images were created with non-ionic intravenous contrast material.   PROTOCOL:   Standard imaging protocol performed    RADIATION:   DLP: 1744mGy*cm   Automated exposure control was utilized to minimize radiation dose. CONTRAST: 100cc Isovue 370 I.V.  FINDINGS:  There is some underlying hepatic steatosis.  There is a lesion involving the lateral segment of the left lobe measuring 2.75 x 2.3 cm.  This is more prominent in size as compared to prior study from 2011 when this measured 0.79 cm.  This might reflect hemangioma.  Patient has had a cholecystectomy.  There is no abnormality of the spleen or pancreas.  Adrenal glands are grossly unremarkable.  There is some cortical scarring involving the upper pole right kidney.  There is a nonobstructing calculus in the upper pole right kidney measuring 0.69 cm.  The bladder is contracted.  There is no acute bowel abnormality.  There is a dominant left ovarian cystic area measuring 5.9 x 5.2 cm.  There was a cystic area within this region on the prior CT.  The visualized osseous structures do not appear unusual.         1. Dominant left ovarian cystic structure.  Ultrasound suggested to reassess. 2. Lesion lateral segment left lobe liver which seems more prominent as compared to older CT.  This is less defined on more delayed images and might reflect hemangioma.  A nonemergent MRI  follow-up suggested to reassess. 3. Nonobstructing calculus upper pole right kidney.  There is cortical scarring involving the upper pole of the right kidney.     SIRENA ENAMORADO MD       Electronically Signed and Approved By: SIRENA ENAMORADO MD on 12/01/2023 at 13:49                Differential Diagnosis and Discussion:    {Differentials:52376}    {Independent Review of (Optional):52970}    MDM     {Critical Care:78419}    Patient Care Considerations:    {Considerations (Optional):68953}      Consultants/Shared Management Plan:    {Shared Management Plan (Optional):48974}    Social Determinants of Health:    {Social Determinants of Health (Optional):90342}      Disposition and Care Coordination:    {Admission consideration:91196}    {Discharge (Optional):05383}    Final diagnoses:   None        ED Disposition       None            This medical record created using voice recognition software.

## 2023-12-01 NOTE — DISCHARGE INSTRUCTIONS
Activity as tolerated.  Take the pain medication as directed.  Try to use sparingly.  Consider taking over-the-counter stool softener to avoid constipation.  You may also take Tylenol or ibuprofen for pain.  Call and schedule outpatient follow-up with your OB/GYN's office.  Return to the ER for uncontrollable pain, fever greater than 101, or any other concerns issues that may arise.    You have a small area in your liver that is more prominent, which could be a hemangioma, radiologist recommendation is to follow-up with your primary care provider for a non-emergent MRI for further evaluation.

## 2023-12-02 DIAGNOSIS — F41.9 ANXIETY: ICD-10-CM

## 2023-12-02 DIAGNOSIS — F32.A DEPRESSION, UNSPECIFIED DEPRESSION TYPE: ICD-10-CM

## 2023-12-04 RX ORDER — BUSPIRONE HYDROCHLORIDE 5 MG/1
5 TABLET ORAL 3 TIMES DAILY
Qty: 90 TABLET | Refills: 2 | Status: SHIPPED | OUTPATIENT
Start: 2023-12-04

## 2023-12-04 RX ORDER — ESCITALOPRAM OXALATE 10 MG/1
10 TABLET ORAL DAILY
Qty: 30 TABLET | Refills: 2 | Status: SHIPPED | OUTPATIENT
Start: 2023-12-04

## 2023-12-05 NOTE — PROGRESS NOTES
"GYN Problem/Follow Up Visit    Chief Complaint   Patient presents with    Gynecologic Exam        HPI  Sunitha Ly is a 43 y.o. female, , who presents for follow up ER visit 23, c/o sharp left lower quadrant pain radiating to back, hx of PCOS, ovarian cysts, kidney stones, found to have a 4.5 cm left ovarian cyst.     Pain has since resolved.   S/p supracervical hysterectomy secondary to Placenta accreta    IGP,rfx Aptima HPV All Pth (2021 16:19)  US Non-ob Transvaginal (2023 16:12)   Urinalysis With Microscopic If Indicated (No Culture) - Urine, Clean Catch (2023 12:56)  CBC & Differential (2023 11:36)      Additional OB/GYN History   Patient's last menstrual period was 2014 (lmp unknown).  Current contraception: contraceptive methods: s/p hysterectomy    Past Medical History:   Diagnosis Date    Abnormal Pap smear of cervix     Arthritis     Chlamydia     Diabetes mellitus     Endometriosis     Gonorrhea     Kidney stone     Migraine without aura     Ovarian cyst     Placenta accreta     Urinary tract infection       Past Surgical History:   Procedure Laterality Date     SECTION      3    CHOLECYSTECTOMY      CYSTOSCOPY BLADDER STONE LITHOTRIPSY      HYSTERECTOMY  2014    emergency post partum, Placenta accreta, supra cervical    WISDOM TOOTH EXTRACTION        Family History   Problem Relation Age of Onset    Hypertension Father     Breast cancer Mother 52    Hypertension Brother     Melanoma Paternal Grandfather 80    Prostate cancer Maternal Grandfather      Allergies as of 2023    (No Known Allergies)      The additional following portions of the patient's history were reviewed and updated as appropriate: allergies, current medications, past family history, past medical history, past social history, past surgical history, and problem list.    Review of Systems    See HPI for pertinent ROS    Objective   /81   Pulse 80   Ht 152.4 cm (60\")   " Wt 109 kg (240 lb 6.4 oz)   LMP 01/14/2014 (LMP Unknown)   BMI 46.95 kg/m²     Physical Exam  Vitals and nursing note reviewed. Exam conducted with a chaperone present.   Constitutional:       Appearance: Normal appearance.   Cardiovascular:      Rate and Rhythm: Normal rate.   Pulmonary:      Effort: Pulmonary effort is normal.   Genitourinary:     General: Normal vulva.      Vagina: Normal.      Cervix: Normal.      Adnexa: Right adnexa normal and left adnexa normal.   Lymphadenopathy:      Lower Body: No right inguinal adenopathy. No left inguinal adenopathy.   Skin:     General: Skin is warm and dry.   Neurological:      Mental Status: She is alert and oriented to person, place, and time.        Assessment and Plan    Diagnoses and all orders for this visit:    1. Cyst of left ovary (Primary)  -     norethindrone (MICRONOR) 0.35 MG tablet; Take 1 tablet by mouth Daily.  Dispense: 84 tablet; Refill: 3      Counseling:  Weight loss/Nutrition reviewed.  All treatment options with regard to pt hx NLT hormonal, surgical, expectant R/B/A/SE/E   PCOS- Dx, Tx, weight, pregnancy, periods/anovulation, cholesterol, insulin, and uterine cancer risk discussed w pt.  TORSION precautions.  To ER immediately.  Questions answered.  She agrees to FU prn worsening or persistent pain.  Heat and nsaids per package instructions for pelvic cr amping PRN  Counseled regarding hormone verses expectant management, desires trial progestin only hormone for suppression ovulation      Follow Up:  Return in about 3 months (around 3/8/2024).        Leanne Jimenez, APRN  12/08/2023

## 2023-12-07 ENCOUNTER — OFFICE VISIT (OUTPATIENT)
Dept: FAMILY MEDICINE CLINIC | Facility: CLINIC | Age: 43
End: 2023-12-07
Payer: COMMERCIAL

## 2023-12-07 VITALS
SYSTOLIC BLOOD PRESSURE: 124 MMHG | WEIGHT: 237.8 LBS | DIASTOLIC BLOOD PRESSURE: 82 MMHG | BODY MASS INDEX: 46.68 KG/M2 | HEIGHT: 60 IN | TEMPERATURE: 98.4 F | HEART RATE: 72 BPM | OXYGEN SATURATION: 97 %

## 2023-12-07 DIAGNOSIS — D18.03 LIVER HEMANGIOMA: Primary | ICD-10-CM

## 2023-12-07 DIAGNOSIS — K76.0 HEPATIC STEATOSIS: ICD-10-CM

## 2023-12-07 DIAGNOSIS — E66.01 CLASS 3 SEVERE OBESITY DUE TO EXCESS CALORIES WITH SERIOUS COMORBIDITY AND BODY MASS INDEX (BMI) OF 45.0 TO 49.9 IN ADULT: ICD-10-CM

## 2023-12-07 RX ORDER — PEN NEEDLE, DIABETIC 31 G X1/4"
1 NEEDLE, DISPOSABLE MISCELLANEOUS DAILY
Qty: 100 EACH | Refills: 2 | Status: SHIPPED | OUTPATIENT
Start: 2023-12-07

## 2023-12-07 RX ORDER — ONDANSETRON 4 MG/1
4 TABLET, ORALLY DISINTEGRATING ORAL EVERY 8 HOURS PRN
Qty: 20 TABLET | Refills: 1 | Status: SHIPPED | OUTPATIENT
Start: 2023-12-07

## 2023-12-07 NOTE — PROGRESS NOTES
Chief Complaint  Chief Complaint   Patient presents with    ER follow up     Seen in ER on 2023, CT scan showed left liver lobe lesion- possible hemangioma and recommend MRI    Ovarian Cyst     Left ovarian cyst       Subjective      Sunitha Ly presents to [facility]  The patient is a 43-year-old female who comes in today to follow up after a recent ER visit where she was seen for abdominal pain and left ovarian cyst. CT scan was performed in the ED revealing the left ovarian cyst and incidental finding of hemangioma to the left lobe of the liver. This is a new finding that the patient was unaware of and is concerned about and asking for further evaluation of this.    She was seen in the ER for abdominal pain. She believed it was a kidney stone. She has experienced a cyst in the past. She began experiencing cramps after her hysterectomy. She was seen at the ER 2 to 3 years ago due to cramps and was told it was a ruptured cyst. She was told her cysts at her last visit at the ER were stable. The cyst measured 4.5 cm on her left ovary. Her last ultrasound was in  that was normal. She has a history of kidney stones. She denies hematuria but experienced dysuria. Her urinalysis was normal.     She is inquiring about Mounjaro for weight loss. She has noticed some food aversions since taking the metformin. She has had a stomach virus for the last 2 days.    Her mother had breast cancer and ended up in her liver.    Objective     Medical History:  Past Medical History:   Diagnosis Date    Abnormal Pap smear of cervix     Arthritis     Chlamydia     Diabetes mellitus     Endometriosis     Gonorrhea     Kidney stone     Migraine     Ovarian cyst     Placenta accreta     Urinary tract infection      Past Surgical History:   Procedure Laterality Date     SECTION      3    CHOLECYSTECTOMY      CYSTOSCOPY BLADDER STONE LITHOTRIPSY      HYSTERECTOMY  2014    emergency  "post partum, Placenta accreta    WISDOM TOOTH EXTRACTION        Social History     Tobacco Use    Smoking status: Never    Smokeless tobacco: Never   Vaping Use    Vaping Use: Never used   Substance Use Topics    Alcohol use: Yes     Comment: ocassional    Drug use: Never     Family History   Problem Relation Age of Onset    Hypertension Father     Breast cancer Mother 52    Hypertension Brother     Melanoma Paternal Grandfather 80    Prostate cancer Maternal Grandfather        Medications:  Prior to Admission medications    Medication Sig Start Date End Date Taking? Authorizing Provider   buPROPion XL (Wellbutrin XL) 150 MG 24 hr tablet Take 1 tablet by mouth Daily. 10/4/23  Yes Radha Sethi APRN   busPIRone (BUSPAR) 5 MG tablet TAKE ONE TABLET BY MOUTH THREE TIMES A DAY 12/4/23  Yes Radha Sethi APRN   escitalopram (LEXAPRO) 10 MG tablet TAKE ONE TABLET BY MOUTH DAILY 12/4/23  Yes Radha Sethi APRN   HYDROcodone-acetaminophen (NORCO) 7.5-325 MG per tablet Take 1 tablet by mouth Every 6 (Six) Hours As Needed for Moderate Pain. 12/1/23  Yes Benedicto Newsome,    metFORMIN ER (GLUCOPHAGE-XR) 500 MG 24 hr tablet TAKE ONE TABLET BY MOUTH DAILY WITH BREAKFAST 10/2/23  Yes Radha Sethi APRN   Naproxen Sodium (ALEVE PO)    Yes ProviderJoanne MD   Vitamin D, Cholecalciferol, (CHOLECALCIFEROL) 10 MCG (400 UNIT) tablet Take 500 Units by mouth Daily.   Yes ProviderJoanne MD        Allergies:   Patient has no known allergies.    Health Maintenance Due   Topic Date Due    Pneumococcal Vaccine 0-64 (2 - PPSV23 or PCV20) 03/16/2015    COVID-19 Vaccine (3 - 2023-24 season) 09/01/2023         Vital Signs:   /82 (BP Location: Left arm, Patient Position: Sitting, Cuff Size: Adult)   Pulse 72   Temp 98.4 °F (36.9 °C) (Oral)   Ht 152.4 cm (60\")   Wt 108 kg (237 lb 12.8 oz)   SpO2 97%   BMI 46.44 kg/m²     Wt Readings from Last 3 Encounters:   12/07/23 108 kg (237 lb 12.8 " oz)   12/01/23 109 kg (240 lb 4.8 oz)   11/27/23 111 kg (245 lb)     BP Readings from Last 3 Encounters:   12/07/23 124/82   12/01/23 108/69   10/13/23 125/84     Physical Exam  Vitals reviewed.   Constitutional:       Appearance: Normal appearance. She is well-developed. She is morbidly obese.   HENT:      Head: Normocephalic and atraumatic.   Eyes:      Conjunctiva/sclera: Conjunctivae normal.      Pupils: Pupils are equal, round, and reactive to light.   Cardiovascular:      Rate and Rhythm: Normal rate and regular rhythm.      Heart sounds: No murmur heard.     No friction rub. No gallop.   Pulmonary:      Effort: Pulmonary effort is normal.      Breath sounds: Normal breath sounds. No wheezing or rhonchi.   Abdominal:      General: Bowel sounds are normal. There is no distension.      Palpations: Abdomen is soft.      Tenderness: There is no abdominal tenderness.   Skin:     General: Skin is warm and dry.   Neurological:      Mental Status: She is alert and oriented to person, place, and time.      Cranial Nerves: No cranial nerve deficit.   Psychiatric:         Mood and Affect: Mood and affect normal.         Behavior: Behavior normal.         Thought Content: Thought content normal.         Judgment: Judgment normal.         Result Review :    The following data was reviewed by SOY Ghosh on 12/08/23 at 07:22 EST:    Common labs          3/31/2023    10:11 12/1/2023    11:36   Common Labs   Glucose 135  118    BUN 12  10    Creatinine 0.79  0.73    Sodium 139  139    Potassium 4.3  3.9    Chloride 103  103    Calcium 9.0  9.1    Albumin 4.1  4.2    Total Bilirubin 0.4  0.4    Alkaline Phosphatase 65  66    AST (SGOT) 12  15    ALT (SGPT) 17  26    WBC 5.49  5.51    Hemoglobin 14.4  13.9    Hematocrit 43.0  41.5    Platelets 183  162    Total Cholesterol 219     Triglycerides 82     HDL Cholesterol 45     LDL Cholesterol  159     Hemoglobin A1C 6.00           CT Abdomen Pelvis With  Contrast    Result Date: 12/1/2023    1. Dominant left ovarian cystic structure.  Ultrasound suggested to reassess. 2. Lesion lateral segment left lobe liver which seems more prominent as compared to older CT.  This is less defined on more delayed images and might reflect hemangioma.  A nonemergent MRI follow-up suggested to reassess. 3. Nonobstructing calculus upper pole right kidney.  There is cortical scarring involving the upper pole of the right kidney.     SIRENA ENAMORADO MD       Electronically Signed and Approved By: SIRENA ENAMORADO MD on 12/01/2023 at 13:49                      Assessment and Plan    Diagnoses and all orders for this visit:    1. Liver hemangioma (Primary)  -     MRI Abdomen With Contrast; Future    2. Hepatic steatosis  -     Liraglutide (SAXENDA) 18 MG/3ML injection pen; Inject 0.6mg under skin daily for week one, THEN 1.2mg daily for week two, THEN 1.8mg daily for week three, then 2.4mg daily for week four.  Dispense: 15 mL; Refill: 0  -     MRI Abdomen With Contrast; Future    3. Class 3 severe obesity due to excess calories with serious comorbidity and body mass index (BMI) of 45.0 to 49.9 in adult  -     Liraglutide (SAXENDA) 18 MG/3ML injection pen; Inject 0.6mg under skin daily for week one, THEN 1.2mg daily for week two, THEN 1.8mg daily for week three, then 2.4mg daily for week four.  Dispense: 15 mL; Refill: 0    Other orders  -     ondansetron ODT (ZOFRAN-ODT) 4 MG disintegrating tablet; Place 1 tablet on the tongue Every 8 (Eight) Hours As Needed for Nausea or Vomiting.  Dispense: 20 tablet; Refill: 1  -     Insulin Pen Needle (Pen Needles) 31G X 6 MM misc; Use 1 Needle Daily.  Dispense: 100 each; Refill: 2       1. Ruptured left ovarian cyst.  She has an appointment with her gynecologist tomorrow.    2. Fatty liver/hemangioma.  I will get an MRI of the abdomen. We discussed diet modifications. She was advised to focus on diet, less sugars, carbs, salt, more clean eating, and weight  loss. Initiate GLP-1 agonist therapy.     3. Renal calculus.  She was advised to go to the ER if she has any symptoms. If she has right flank pain, pain with urination, or blood in her urine, she will call us and we will place a referral to urology.    4. Prediabetes.  Her hemoglobin A1c has improved from 6.2 to 6 percent.    5. Weight loss.  We discussed Mounjaro, Saxenda, and Wegovy. I will start her on Saxenda with the plan of transitioning over to the Wegovy once her dose increases. Side effects of Saxenda were discussed with the patient. I will start her on Zofran for nausea.    Follow-up  The patient will follow up in approximately 6 weeks to monitor tolerance and effectiveness of new medication.          Follow Up   Return in about 6 weeks (around 1/18/2024) for Next scheduled follow up.  Patient was given instructions and counseling regarding her condition or for health maintenance advice. Please see specific information pulled into the AVS if appropriate.     Please note that portions of this note were completed with a voice recognition program.    .[email or web address]  Arabella Galaviz

## 2023-12-08 ENCOUNTER — OFFICE VISIT (OUTPATIENT)
Dept: OBSTETRICS AND GYNECOLOGY | Facility: CLINIC | Age: 43
End: 2023-12-08
Payer: COMMERCIAL

## 2023-12-08 VITALS
BODY MASS INDEX: 47.2 KG/M2 | WEIGHT: 240.4 LBS | SYSTOLIC BLOOD PRESSURE: 128 MMHG | HEART RATE: 80 BPM | DIASTOLIC BLOOD PRESSURE: 81 MMHG | HEIGHT: 60 IN

## 2023-12-08 DIAGNOSIS — N83.202 CYST OF LEFT OVARY: Primary | ICD-10-CM

## 2023-12-08 RX ORDER — ACETAMINOPHEN AND CODEINE PHOSPHATE 120; 12 MG/5ML; MG/5ML
1 SOLUTION ORAL DAILY
Qty: 84 TABLET | Refills: 3 | Status: SHIPPED | OUTPATIENT
Start: 2023-12-08 | End: 2024-12-07

## 2023-12-12 RX ORDER — ONDANSETRON 4 MG/1
4 TABLET, FILM COATED ORAL EVERY 8 HOURS PRN
Qty: 12 TABLET | Refills: 1 | Status: SHIPPED | OUTPATIENT
Start: 2023-12-12

## 2023-12-19 ENCOUNTER — HOSPITAL ENCOUNTER (OUTPATIENT)
Dept: MRI IMAGING | Facility: HOSPITAL | Age: 43
Discharge: HOME | End: 2023-12-19
Payer: COMMERCIAL

## 2023-12-19 DIAGNOSIS — M25.511 CHRONIC PAIN OF BOTH SHOULDERS: ICD-10-CM

## 2023-12-19 DIAGNOSIS — G89.29 CHRONIC PAIN OF BOTH SHOULDERS: ICD-10-CM

## 2023-12-19 DIAGNOSIS — M25.512 CHRONIC PAIN OF BOTH SHOULDERS: ICD-10-CM

## 2023-12-19 PROCEDURE — 73221 MRI JOINT UPR EXTREM W/O DYE: CPT

## 2023-12-29 ENCOUNTER — TELEPHONE (OUTPATIENT)
Dept: FAMILY MEDICINE CLINIC | Facility: CLINIC | Age: 43
End: 2023-12-29
Payer: COMMERCIAL

## 2023-12-29 NOTE — TELEPHONE ENCOUNTER
Caller: TODD    Relationship: Other    Best call back number: [phone]    What orders are you requesting (i.e. lab or imaging): MRI OF ABDOMEN WITH CONTRAST    In what timeframe would the patient need to come in: ASAP    Where will you receive your lab/imaging services: [facility]    FAX: [phone]    Additional notes: TODD STATES THE PATIENT CHOSE TO GO TO [facility] FOR THE MRI BECAUSE IT IS CHEAPER BUT THEY NEED ORDERS.

## 2024-01-01 DIAGNOSIS — F41.1 GENERALIZED ANXIETY DISORDER: ICD-10-CM

## 2024-01-01 DIAGNOSIS — F33.0 MILD EPISODE OF RECURRENT MAJOR DEPRESSIVE DISORDER: ICD-10-CM

## 2024-01-02 RX ORDER — BUPROPION HYDROCHLORIDE 150 MG/1
150 TABLET ORAL DAILY
Qty: 30 TABLET | Refills: 2 | Status: SHIPPED | OUTPATIENT
Start: 2024-01-02

## 2024-01-10 DIAGNOSIS — K76.0 HEPATIC STEATOSIS: Primary | ICD-10-CM

## 2024-01-10 DIAGNOSIS — D18.03 LIVER HEMANGIOMA: ICD-10-CM

## 2024-01-12 DIAGNOSIS — D18.03 LIVER HEMANGIOMA: ICD-10-CM

## 2024-01-12 DIAGNOSIS — K76.0 HEPATIC STEATOSIS: ICD-10-CM

## 2024-01-16 ENCOUNTER — OFFICE VISIT (OUTPATIENT)
Dept: FAMILY MEDICINE CLINIC | Facility: CLINIC | Age: 44
End: 2024-01-16
Payer: COMMERCIAL

## 2024-01-16 VITALS
BODY MASS INDEX: 47.37 KG/M2 | TEMPERATURE: 98 F | HEART RATE: 97 BPM | SYSTOLIC BLOOD PRESSURE: 126 MMHG | OXYGEN SATURATION: 98 % | DIASTOLIC BLOOD PRESSURE: 78 MMHG | WEIGHT: 241.3 LBS | HEIGHT: 60 IN

## 2024-01-16 DIAGNOSIS — D18.03 LIVER HEMANGIOMA: ICD-10-CM

## 2024-01-16 DIAGNOSIS — G47.33 OSA (OBSTRUCTIVE SLEEP APNEA): ICD-10-CM

## 2024-01-16 DIAGNOSIS — R53.82 CHRONIC FATIGUE: ICD-10-CM

## 2024-01-16 DIAGNOSIS — Q45.3: ICD-10-CM

## 2024-01-16 DIAGNOSIS — K76.0 HEPATIC STEATOSIS: Primary | ICD-10-CM

## 2024-01-16 DIAGNOSIS — E66.01 CLASS 3 SEVERE OBESITY DUE TO EXCESS CALORIES WITH SERIOUS COMORBIDITY AND BODY MASS INDEX (BMI) OF 45.0 TO 49.9 IN ADULT: ICD-10-CM

## 2024-01-16 PROCEDURE — 99214 OFFICE O/P EST MOD 30 MIN: CPT

## 2024-01-16 NOTE — PROGRESS NOTES
Chief Complaint  Chief Complaint   Patient presents with    Obesity       Subjective      Sunitha Ly presents to [facility]  The patient is a 43-year-old female who comes in today to follow up on obesity, fatty liver, and to discuss recent MRI findings.    She went to the ER for abdominal pain, and they thought it was an ovarian cyst. She was not aware of the hemangioma that was found on the MRI. She inquired what the pain would feel like if she were to have anything associated with the pancreas. She wondered if it was her kidney stone.    She kept checking with the pharmacy about the Saxenda, and it kept saying prior authorization. She got a consultation meeting yesterday with regard weight management program through her insurance, 01/15/2024 and she needs to have labs drawn. She was told that if she does the weight management program for 3 months, they will cover the medicine.    Objective     Medical History:  Past Medical History:   Diagnosis Date    Abnormal Pap smear of cervix     Arthritis     Chlamydia     Diabetes mellitus     Endometriosis     Gonorrhea     Kidney stone     Migraine without aura     Ovarian cyst     Placenta accreta     Urinary tract infection      Past Surgical History:   Procedure Laterality Date     SECTION      3    CHOLECYSTECTOMY      CYSTOSCOPY BLADDER STONE LITHOTRIPSY      HYSTERECTOMY  2014    emergency post partum, Placenta accreta, supra cervical    WISDOM TOOTH EXTRACTION        Social History     Tobacco Use    Smoking status: Never    Smokeless tobacco: Never   Vaping Use    Vaping Use: Never used   Substance Use Topics    Alcohol use: Yes     Comment: ocassional    Drug use: Never     Family History   Problem Relation Age of Onset    Hypertension Father     Breast cancer Mother 52    Hypertension Brother     Melanoma Paternal Grandfather 80    Prostate cancer Maternal Grandfather        Medications:  Prior to Admission  medications    Medication Sig Start Date End Date Taking? Authorizing Provider   buPROPion XL (WELLBUTRIN XL) 150 MG 24 hr tablet TAKE 1 TABLET BY MOUTH DAILY 1/2/24  Yes Radha Sethi APRN   busPIRone (BUSPAR) 5 MG tablet TAKE ONE TABLET BY MOUTH THREE TIMES A DAY 12/4/23  Yes Radha Sethi APRN   escitalopram (LEXAPRO) 10 MG tablet TAKE ONE TABLET BY MOUTH DAILY 12/4/23  Yes Radha Sethi APRN   HYDROcodone-acetaminophen (NORCO) 7.5-325 MG per tablet Take 1 tablet by mouth Every 6 (Six) Hours As Needed for Moderate Pain. 12/1/23  Yes Benedicto Newsome,    Insulin Pen Needle (Pen Needles) 31G X 6 MM misc Use 1 Needle Daily. 12/7/23  Yes Radha Sethi APRN   Liraglutide (SAXENDA) 18 MG/3ML injection pen Inject 0.6mg under skin daily for week one, THEN 1.2mg daily for week two, THEN 1.8mg daily for week three, then 2.4mg daily for week four. 12/7/23  Yes Radha Sethi APRN   metFORMIN ER (GLUCOPHAGE-XR) 500 MG 24 hr tablet TAKE ONE TABLET BY MOUTH DAILY WITH BREAKFAST 10/2/23  Yes Radha Sethi APRN   Naproxen Sodium (ALEVE PO)    Yes Joanne Hall MD   norethindrone (MICRONOR) 0.35 MG tablet Take 1 tablet by mouth Daily. 12/8/23 12/7/24 Yes Leanne Jimenez APRN   ondansetron (Zofran) 4 MG tablet Take 1 tablet by mouth Every 8 (Eight) Hours As Needed for Nausea or Vomiting. 12/12/23  Yes Radha Sethi APRN   Vitamin D, Cholecalciferol, (CHOLECALCIFEROL) 10 MCG (400 UNIT) tablet Take 500 Units by mouth Daily.   Yes Joanne Hall MD   ondansetron ODT (ZOFRAN-ODT) 4 MG disintegrating tablet Place 1 tablet on the tongue Every 8 (Eight) Hours As Needed for Nausea or Vomiting. 12/7/23   Radha Sethi APRN        Allergies:   Patient has no known allergies.    Health Maintenance Due   Topic Date Due    Pneumococcal Vaccine 0-64 (2 of 2 - PPSV23 or PCV20) 03/16/2015    COVID-19 Vaccine (3 - 2023-24 season) 09/01/2023         Vital  "Signs:   /78 (BP Location: Left arm, Patient Position: Sitting, Cuff Size: Adult)   Pulse 97   Temp 98 °F (36.7 °C) (Oral)   Ht 152.4 cm (60\")   Wt 109 kg (241 lb 4.8 oz)   SpO2 98%   BMI 47.13 kg/m²     Wt Readings from Last 3 Encounters:   01/16/24 109 kg (241 lb 4.8 oz)   12/08/23 109 kg (240 lb 6.4 oz)   12/07/23 108 kg (237 lb 12.8 oz)     BP Readings from Last 3 Encounters:   01/16/24 126/78   12/08/23 128/81   12/07/23 124/82       Physical Exam  Vitals reviewed.   Constitutional:       Appearance: Normal appearance. She is well-developed. She is morbidly obese.   HENT:      Head: Normocephalic and atraumatic.   Eyes:      Conjunctiva/sclera: Conjunctivae normal.      Pupils: Pupils are equal, round, and reactive to light.   Cardiovascular:      Rate and Rhythm: Normal rate and regular rhythm.      Heart sounds: No murmur heard.     No friction rub. No gallop.   Pulmonary:      Effort: Pulmonary effort is normal.      Breath sounds: Normal breath sounds. No wheezing or rhonchi.   Abdominal:      General: Bowel sounds are normal. There is no distension.      Palpations: Abdomen is soft.      Tenderness: There is no abdominal tenderness.   Skin:     General: Skin is warm and dry.   Neurological:      Mental Status: She is alert and oriented to person, place, and time.      Cranial Nerves: No cranial nerve deficit.   Psychiatric:         Mood and Affect: Mood and affect normal.         Behavior: Behavior normal.         Thought Content: Thought content normal.         Judgment: Judgment normal.         Result Review :    The following data was reviewed by SOY Ghosh on 01/17/24 at 07:15 EST:    Common labs          3/31/2023    10:11 12/1/2023    11:36   Common Labs   Glucose 135  118    BUN 12  10    Creatinine 0.79  0.73    Sodium 139  139    Potassium 4.3  3.9    Chloride 103  103    Calcium 9.0  9.1    Albumin 4.1  4.2    Total Bilirubin 0.4  0.4    Alkaline Phosphatase 65  66  "   AST (SGOT) 12  15    ALT (SGPT) 17  26    WBC 5.49  5.51    Hemoglobin 14.4  13.9    Hematocrit 43.0  41.5    Platelets 183  162    Total Cholesterol 219     Triglycerides 82     HDL Cholesterol 45     LDL Cholesterol  159     Hemoglobin A1C 6.00         CT Abdomen Pelvis With Contrast    Result Date: 12/1/2023    1. Dominant left ovarian cystic structure.  Ultrasound suggested to reassess. 2. Lesion lateral segment left lobe liver which seems more prominent as compared to older CT.  This is less defined on more delayed images and might reflect hemangioma.  A nonemergent MRI follow-up suggested to reassess. 3. Nonobstructing calculus upper pole right kidney.  There is cortical scarring involving the upper pole of the right kidney.     SIRENA ENAMORADO MD       Electronically Signed and Approved By: SIRENA ENAMORADO MD on 12/01/2023 at 13:49                        Assessment and Plan    Diagnoses and all orders for this visit:    1. Hepatic steatosis (Primary)    2. Liver hemangioma    3. Class 3 severe obesity due to excess calories with serious comorbidity and body mass index (BMI) of 45.0 to 49.9 in adult    4. Anomalies of pancreas, congenital    5. Chronic fatigue  -     Vitamin B12 & Folate; Future  -     Ambulatory Referral to Sleep Medicine    6. ANGELICA (obstructive sleep apnea)  -     Ambulatory Referral to Sleep Medicine       1. Obesity.  Patient to continue with weight management program required by her insurance.  After 3 months of active participation with this program patient will then be considered initiating GLP-1 agonist treatment as prescribed.  This medication is only for obesity but also to treat hepatic steatosis.    2. Fatty liver.  The MRI of the abdomen was reassuring with no acute concerns. She does have a hemangioma in her liver that is 2.9 x 2.3 cm. It appears to be benign, so there is no concern of the hemangioma. We will continue to monitor for symptoms of abdominal pain or changes in  characteristics or size of the hemangioma.     3. Congenital malformation of pancreas.  She supposedly has a congenital malformation of her pancreas. Her enzymes when she was in the ER,  amylase and lipase were normal.  Patient has no history of pancreatitis and no current abdominal pain.    Patient will provide an update in 3 months regarding weight management program and ability to initiate medication as prescribed.  She will follow-up with me in person either in 3 months to discuss initiating medication or in 4 to 6 months to follow-up on plan.        Follow Up   Return in about 6 months (around 7/16/2024) for Next scheduled follow up.  Patient was given instructions and counseling regarding her condition or for health maintenance advice. Please see specific information pulled into the AVS if appropriate.     Please note that portions of this note were completed with a voice recognition program.      Transcribed from ambient dictation for SOY Ghosh by Love Hernandez.  01/16/24   17:22 EST    Patient or patient representative verbalized consent to the visit recording.  I have personally performed the services described in this document as transcribed by the above individual, and it is both accurate and complete.

## 2024-01-18 DIAGNOSIS — R53.82 CHRONIC FATIGUE: ICD-10-CM

## 2024-01-29 ENCOUNTER — OFFICE VISIT (OUTPATIENT)
Dept: ORTHOPEDIC SURGERY | Facility: CLINIC | Age: 44
End: 2024-01-29
Payer: COMMERCIAL

## 2024-01-29 VITALS — BODY MASS INDEX: 47.32 KG/M2 | WEIGHT: 241 LBS | HEIGHT: 60 IN

## 2024-01-29 DIAGNOSIS — M25.511 RIGHT SHOULDER PAIN, UNSPECIFIED CHRONICITY: ICD-10-CM

## 2024-01-29 DIAGNOSIS — M25.512 LEFT SHOULDER PAIN, UNSPECIFIED CHRONICITY: Primary | ICD-10-CM

## 2024-01-29 PROCEDURE — 20610 DRAIN/INJ JOINT/BURSA W/O US: CPT | Performed by: ORTHOPAEDIC SURGERY

## 2024-01-29 PROCEDURE — 99213 OFFICE O/P EST LOW 20 MIN: CPT | Performed by: ORTHOPAEDIC SURGERY

## 2024-01-29 RX ORDER — LIDOCAINE HYDROCHLORIDE 10 MG/ML
5 INJECTION, SOLUTION INFILTRATION; PERINEURAL
Status: COMPLETED | OUTPATIENT
Start: 2024-01-29 | End: 2024-01-29

## 2024-01-29 RX ORDER — TRIAMCINOLONE ACETONIDE 40 MG/ML
40 INJECTION, SUSPENSION INTRA-ARTICULAR; INTRAMUSCULAR
Status: COMPLETED | OUTPATIENT
Start: 2024-01-29 | End: 2024-01-29

## 2024-01-29 RX ADMIN — TRIAMCINOLONE ACETONIDE 40 MG: 40 INJECTION, SUSPENSION INTRA-ARTICULAR; INTRAMUSCULAR at 15:00

## 2024-01-29 RX ADMIN — LIDOCAINE HYDROCHLORIDE 5 ML: 10 INJECTION, SOLUTION INFILTRATION; PERINEURAL at 15:00

## 2024-01-29 NOTE — PROGRESS NOTES
"Chief Complaint  Follow-up of the Right Shoulder and Follow-up of the Left Shoulder     Subjective      Sunitha Ly presents to [facility] for follow up of bilateral shoulders. Her left shoulder is worse then the right.  She has had injections in the past that aides in pain relief.  She has had physical therapy in the past.  She has pain in the shoulder and down the arm.  She has difficulty with overhead, cross body and forward ROM of the shoulder. She has had no injury or fall.     No Known Allergies     Social History     Socioeconomic History    Marital status:    Tobacco Use    Smoking status: Never    Smokeless tobacco: Never   Vaping Use    Vaping Use: Never used   Substance and Sexual Activity    Alcohol use: Yes     Comment: I only drink socially and not much at any one time.    Drug use: Never    Sexual activity: Yes     Partners: Male     Birth control/protection: None, Birth control pill, Hysterectomy        I reviewed the patient's chief complaint, history of present illness, review of systems, past medical history, surgical history, family history, social history, medications, and allergy list.     Review of Systems     Constitutional: Denies fevers, chills, weight loss  Cardiovascular: Denies chest pain, shortness of breath  Skin: Denies rashes, acute skin changes  Neurologic: Denies headache, loss of consciousness        Vital Signs:   Ht 152.4 cm (60\")   Wt 109 kg (241 lb)   BMI 47.07 kg/m²          Physical Exam  General: Alert. No acute distress    Ortho Exam        Left shoulder: Active range of motion forward shoulder flexion 180 degrees, abduction 160, internal rotation T12, external rotation 90 degrees.  Negative impingement sign.  Neurovascular intact.  Sensation is intact. Positive drop arm.      Right shoulder: Active range of motion forward shoulder flexion 160 degrees, abduction 110 degrees, internal rotation T12, external rotation 90 degrees. "  Negative impingement sign.  Neurovascular intact.  Sensation is intact. Positive Drop arm test      Large Joint Arthrocentesis: R subacromial bursa  Date/Time: 1/29/2024 3:00 PM  Consent given by: patient  Site marked: site marked  Timeout: Immediately prior to procedure a time out was called to verify the correct patient, procedure, equipment, support staff and site/side marked as required   Supporting Documentation  Indications: pain   Procedure Details  Location: shoulder - R subacromial bursa  Needle size: 22 G  Medications administered: 5 mL lidocaine 1 %; 40 mg triamcinolone acetonide 40 MG/ML  Patient tolerance: patient tolerated the procedure well with no immediate complications      Large Joint Arthrocentesis: L subacromial bursa  Date/Time: 1/29/2024 3:00 PM  Consent given by: patient  Site marked: site marked  Timeout: Immediately prior to procedure a time out was called to verify the correct patient, procedure, equipment, support staff and site/side marked as required   Supporting Documentation  Indications: pain   Procedure Details  Location: shoulder - L subacromial bursa  Needle size: 22 G  Medications administered: 5 mL lidocaine 1 %; 40 mg triamcinolone acetonide 40 MG/ML  Patient tolerance: patient tolerated the procedure well with no immediate complications          Imaging Results (Most Recent)       None             Result Review :       [facility], MR, MRI SHOULDER LEFT WO CONTRAST, 12/19/2023, 14:19.  [facility] , CR, XR SCAPULA   RIGHT, 9/01/2023, 16:16.     INDICATIONS:  CHRONIC RIGHT SHOULDER PAIN.                         TECHNIQUE:    A variety of imaging planes and parameters were utilized for visualization of suspected   pathology.  Images were performed without contrast.       FINDINGS:          There is advanced tendinopathy of the distal supraspinatus and infraspinatus tendons.  There is a   small focus of fluid signal seen at the distal-posterior  infraspinatus.  This appears to be within   the tendon fibers suspicious for intrasubstance tear.  No definite high-grade rotator cuff tendon   defect is seen.  There is also suspected mild tendinopathy of the distal subscapularis.  The teres   minor tendon appears intact.  The long head of the biceps tendon appears normally positioned.  No   definite biceps tendon tear or tenosynovitis.  No significant rotator cuff or deltoid muscle edema   or atrophy.  No significant fluid in the subacromial/subdeltoid bursa.  No definite axillary   adenopathy.     Glenohumeral joint alignment appears within normal limits.  No significant joint effusion.  There   is suspected mild glenohumeral osteophyte formation and chondromalacia suggesting mild   osteoarthritis.  There appears to be tear of the superior labrum extending anterior to posterior.    No significant paralabral cyst.  Acromioclavicular joint alignment is within normal limits.  There   are mild to moderate degenerative changes at the acromioclavicular joint.  There appears to be mild   edema at the distal clavicle with possible subtle distal clavicle erosions.  No definite acute   fracture.  No other suspicious marrow signal abnormality.     IMPRESSION:                 1. Advanced tendinopathy of the distal supraspinatus and infraspinatus tendons with small   intrasubstance tear of the infraspinatus.  No other definite high-grade tendon tear.  2. Mild glenohumeral osteoarthritis with tear of the superior labrum.  3. Acromioclavicular joint degeneration with mild edema and possible subtle erosions at the distal   clavicle.  This may be seen with chronic distal clavicle osteolysis.  Correlate for pain in this   location.    Narrative & Impression   PROCEDURE:  MRI SHOULDER LEFT WO CONTRAST     COMPARISON: [facility], MR, MRI SHOULDER RIGHT WO CONTRAST, 12/19/2023, 14:48.    [facility] , CR, XR SCAPULA RIGHT, 9/01/2023, 16:16.  [facility] , CR, XR SCAPULA   LEFT, 9/01/2023, 16:16.     INDICATIONS:  Shoulder pain, rotator cuff disorder suspected, xray done                         TECHNIQUE:    A variety of imaging planes and parameters were utilized for visualization of suspected   pathology.  Images were performed without contrast.       FINDINGS:          There is prominent hyperintense signal and thickening at the distal supraspinatus and infraspinatus   tendons.  There is a small amount of intrasubstance fluid signal in this location.  There is   suspicion for a tiny articular surface defect at the posterior-distal supraspinatus seen on coronal   series 4, image 11. There is a small intrasubstance tear at the infraspinatus musculotendinous   junction.  There is also suspicion for a small intrasubstance tear at the superior subscapularis   insertion.  No other definite high-grade rotator cuff tendon defect is seen.  No significant   rotator cuff or deltoid muscle edema or atrophy.  No significant fluid in the   subacromial/subdeltoid bursa.  The long head of the biceps tendon appears normally positioned   without definite high-grade tendon tear or tenosynovitis seen on this exam.     There is suspected mild osteoarthritis of the glenohumeral joint with mild osteophyte formation and   partial thickness cartilage loss.  Alignment is within normal limits.  No significant joint   effusion.  No definite paralabral cyst.  There is suspicion for a small tear of the   posterior-superior labrum.     Acromioclavicular alignment appears within normal limits.  There are mild degenerative changes at   the acromioclavicular joint.  No significant periarticular edema.  No fracture or definite   suspicious marrow signal abnormality.  No definite adenopathy.       IMPRESSION:                 1. Advanced tendinopathy of the supraspinatus and infraspinatus tendons with tiny articular surface   defect of the posterior supraspinatus.    2. Small  intrasubstance tears at the subscapularis insertion and infraspinatus at the   musculotendinous junction.  No significant high-grade tendon defect is seen at this time.  3. Mild glenohumeral osteoarthritis with suspected small tear of the posterior-superior labrum.  4. Mild degenerative changes at the acromioclavicular joint.           Assessment and Plan     Diagnoses and all orders for this visit:    1. Left shoulder pain, unspecified chronicity (Primary)    2. Right shoulder pain, unspecified chronicity        Discussed the treatment plan with the patient. I reviewed the MRI results with the patient.     Discussed the treatment options with the patient, operative vs non-operative. The patient is a candidate for an arthroscopy whenever she is ready.      Discussed the risks and benefits of conservative measures. The patient expressed understanding and wished to proceed with bilateral shoulder steroid injections.  She tolerated the injections well.     Call or return if worsening symptoms.    Follow Up     PRN      Patient was given instructions and counseling regarding her condition or for health maintenance advice. Please see specific information pulled into the AVS if appropriate.     Scribed for Ramy Lopez MD by Kimberlee Monte MA.  01/29/24   14:12 EST      I have personally performed the services described in this document as scribed by the above individual and it is both accurate and complete. Ramy Lopez MD 01/29/24

## 2024-02-26 ENCOUNTER — OFFICE VISIT (OUTPATIENT)
Dept: SLEEP MEDICINE | Facility: HOSPITAL | Age: 44
End: 2024-02-26
Payer: COMMERCIAL

## 2024-02-26 VITALS
OXYGEN SATURATION: 96 % | SYSTOLIC BLOOD PRESSURE: 128 MMHG | HEIGHT: 60 IN | DIASTOLIC BLOOD PRESSURE: 80 MMHG | BODY MASS INDEX: 47.22 KG/M2 | WEIGHT: 240.5 LBS | HEART RATE: 79 BPM

## 2024-02-26 DIAGNOSIS — Z72.821 INADEQUATE SLEEP HYGIENE: ICD-10-CM

## 2024-02-26 DIAGNOSIS — E66.01 CLASS 3 SEVERE OBESITY WITH BODY MASS INDEX (BMI) OF 45.0 TO 49.9 IN ADULT, UNSPECIFIED OBESITY TYPE, UNSPECIFIED WHETHER SERIOUS COMORBIDITY PRESENT: ICD-10-CM

## 2024-02-26 DIAGNOSIS — R29.818 SUSPECTED SLEEP APNEA: Primary | ICD-10-CM

## 2024-02-26 DIAGNOSIS — G47.19 EXCESSIVE DAYTIME SLEEPINESS: ICD-10-CM

## 2024-02-26 DIAGNOSIS — R06.81 WITNESSED EPISODE OF APNEA: ICD-10-CM

## 2024-02-26 DIAGNOSIS — R06.83 SNORING: ICD-10-CM

## 2024-02-26 DIAGNOSIS — F41.9 ANXIETY: ICD-10-CM

## 2024-02-26 DIAGNOSIS — F32.A DEPRESSION, UNSPECIFIED DEPRESSION TYPE: ICD-10-CM

## 2024-02-26 PROCEDURE — G0463 HOSPITAL OUTPT CLINIC VISIT: HCPCS

## 2024-02-26 PROCEDURE — 99204 OFFICE O/P NEW MOD 45 MIN: CPT | Performed by: FAMILY MEDICINE

## 2024-02-26 RX ORDER — LANOLIN ALCOHOL/MO/W.PET/CERES
1000 CREAM (GRAM) TOPICAL DAILY
COMMUNITY

## 2024-02-26 NOTE — PROGRESS NOTES
[facility]  [address] [place] [address]  Phone: [phone]  Fax: [phone]      Sunitha Ly  [id number]   1980  44 y.o.  female      Referring physician/provider and  PCP Radha Sethi APRN    Type of service: Initial Sleep Medicine Consult.  Date of service: 2/26/2024      Chief Complaint   Patient presents with    Snoring    Witnessed Apnea       History of present illness;  The patient was seen today on 2/26/2024 at [facility].    Thank you for asking to see Sunitha Ly, 44 y.o. PMHx Anxiety, Depression, Morbid Obesity.  The patient presents for initial evaluation of sleep sleep disordered breathing.  Patient  denies prior surgery namely tonsillectomy, nasal surgery or UPPP.     Witnessed apneas and Loud snoring by  and others  Snoring disrupts her 's sleep  Unable to supply me with records of any sleep studies  NOT on PAP therapy    Obstructive Sleep Apnea Screening: STOP-BANG Sleep Apnea Questionnaire. Reference: Jason F et al. Br J Anaesth, 2012.     Criterion    Yes    No  Do you SNORE loudly?   [x]   Yes  []   No   Do you often feel TIRED, fatigued, or sleepy during the day?    [x]   Yes  []   No  Has anyone OBSERVED you stop breathing during your sleep?    [x]   Yes  []   No  Do you have or are you being treated for high blood PRESSURE?    []   Yes  [x]   No  BMI >32 kg/m2     [x]   Yes  []   No  AGE > 50 years    []   Yes  [x]   No  NECK circumference >16 inches / 40 cm    [x]   Yes  []   No  GENDER: male     []   Yes  [x]   No    ANGELICA Probability:  []   1-2 - Low  []   3-4 - Intermediate  [x]   5-8 - High      Further Sleep History:    Bedtime: 10 PM-midnight  Rise Time: Weekdays 5:30 AM-6 AM; weekends 10 AM  Sleep Latency: 20 minutes - 60 minutes  Screens in bed: Yes  Wake after sleep onset: Twice  Reasons for awakenings: Nocturia  Number of naps per day up to 1/day  Naps restorative:  "Never  Caffeine use: 3 or more beverages per day    RLS Symptoms: No   Bruxism:No   Current sleep related gastroesophageal reflux symptoms:  No   Cataplexy:  No   Sleep Paralysis:  No   Hypnagogic or hypnopompic hallucinations: No   Parasomnias such as sleep walking or sleep eating No     Disclaimer Sleep History: The above sleep history is based on this sleep physician's in room encounter with the patient. Pre encounter self administered questionnaires are taken into consideration and discussed with patient for any discordance. The above documentation by this sleep physician is the most accurate clinical information determined by in room sleep physician encounter with patient.     MEDICAL CONDITIONS (PMH)   Anxiety  Depression morbid obesity  Prediabetes    Social history:  Do you drive a commercial vehicle:  No   Shift work:  No   Tobacco use:  No   Alcohol use: 0 per week  Occupation: Teacher    Family Hx (parents and siblings) (pertaining to sleep medicine)  Sleep apnea    Medications: reviewed    Review of systems is negative unless otherwise noted per HPI   Disclaimer History: The above history is based on this sleep physician's in room encounter with the patient. Pre encounter self administered questionnaires are taken into consideration and discussed with patient for any discordance. The above documentation by this sleep physician is the most accurate clinical information determined by in room sleep physician encounter with patient.     Physical exam:  Vitals:    02/26/24 1500   BP: 128/80   Pulse: 79   SpO2: 96%   Weight: 109 kg (240 lb 8 oz)   Height: 152.4 cm (60\")    Body mass index is 46.97 kg/m².   CONSTITUTIONAL:  Non-toxic, In no overt distress   Head: normocephalic   ENT: Mallampati class IV, + macroglossia, no septal defects   NECK:Neck Circumference: 17 inches,no nuchal rigidity  RESPIRATORY SYSTEM: Breath sounds are clear (no rales, no rhonchi, no wheezes), no accessory muscle use  CARDIOVASULAR " SYSTEM: Heart sounds are regular rhythm and normal rate, no rub, no gallop, no edema  NEUROLOGICAL SYSTEM: Oriented x 3, No gross focal deficits   PSYCHIATRIC SYSTEM: Goal oriented, affect full range appropriate      Office notes from care team reviewed:    -1/16/2024 Office Visit PCP SOY Sethi  chief complaint obesity    Labs reviewed.  TSH          3/31/2023    10:11   TSH   TSH 1.350       Most Recent A1C          3/31/2023    10:11   HGBA1C Most Recent   Hemoglobin A1C 6.00     -12/1/2023  Bicarb 25.3  -3/31/23  Bicarb 26        Assessment and plan:  Suspected sleep apnea [R29.818] patient's symptoms and examination is consistent with sleep apnea (G47.30). I have talked to the patient about the signs and symptoms of sleep apnea. In addition, I have also discussed pathophysiology of sleep apnea.  I also discussed the complications of untreated sleep apnea including effects on hypertension, diabetes mellitus and nonrestorative sleep with hypersomnia which can increase risk for motor vehicle accidents.  Untreated sleep apnea is also a risk factor for development of atrial fibrillation, hypertension, insulin resistance and cerebrovascular accident.  Discussed in detail of various testing methods including home-based and lab based sleep studies.  Based on history and physical examination and other comorbidities the most appropriate study is Home Sleep Study.  The order for the sleep study is placed in [facility].  The test will be scheduled after approval from insurance. Treatment and management will be discussed after the test is completed.  High pretest probability STOP-BANG 5/8, macroglossia, Mallampati is IV.  Home sleep study may rule in sleep apnea.  However, under patient's specific clinical circumstances home sleep study may not rule out sleep apnea.  If home sleep testing is negative must proceed with in laboratory polysomnography to definitively rule out sleep apnea (the prior was discussed with  patient). Patient was given opportunity to ask questions and all the questions were answered.   Snoring (R06.83), snoring is the sound created by turbulent airflow vibrating upper airway soft tissue due to limitation of inspiratory airflow. I have also discussed factors affecting snoring including sleep deprivation, sleeping on the back and alcohol ingestion. To minimize snoring, patient is advised to have adequate sleep, sleep on the side and avoid alcohol and sedative medications before bedtime  Excessive daytime sleepiness .  Patient endorses subjective excessive daytime sleepiness with sleep physician encounter which was consistent with patient's pre-encounter self-administered [place] Sleepiness Scale of Total score: 18.  There are many causes for daytime excessive sleepiness including sleep depression, shiftwork syndrome, depression and other medical disorders including heart, kidney and liver failure.  This is sleep disordered breathing until proven otherwise. The most common cause of excessive sleepiness is due to sleep apnea with frequent awakenings during sleep time.  I have discussed safety of driving and to remain vigilant while driving; patient verbalized understanding of counseling.  Inadequate sleep hygiene [Z72.821] Counseled lifestyle modifications as below to be applied especially night of any sleep study, verbalized understanding of same. Follow up with PCP to reinforce my counseling.  Obesity, patient's BMI is Body mass index is 46.97 kg/m².. I have discussed the relationship between weight and sleep apnea.There is direct correlation between weight and severity of sleep apnea.  Weight reduction is encouraged, as it is going to reduce the severity of sleep apnea. I have also discussed with the patient diet and exercise to achieve ideal body weight.  Anxiety/depression,  Follow up with primary care physician for continued management. Comorbid mood disorders would make the patient eligible for a  trial of PAP therapy even if sleep study reveals mild severity sleep apnea.,    I have also discussed with the patient the following  Sleep hygiene: Maintaining a regular bedtime and wake time, not to watch television or work in bed, limit caffeine-containing beverages before bed time and avoid naps during the day  Adequate amount of sleep.  Generally most people needs about 7 to 8 hours of sleep.      Return for 31 to 90 days after PAP setup with down load.  Patient's questions were answered      I once again thank you for asking me to see this patient in consultation and I have forwarded my opinion and treatment plan.  Please do not hesitate to call me if you have any questions.       EMR Dragon/Transcription disclaimer:   Much of this encounter note is an electronic transcription/translation of spoken language to printed text. The electronic translation of spoken language may permit erroneous, or at times, nonsensical words or phrases to be inadvertently transcribed; Although I have reviewed the note for such errors, some may still exist.     NPI #: [id number]    Caitlin Stubbs, DO  Sleep Medicine  [facility]  02/26/24

## 2024-03-04 NOTE — PROGRESS NOTES
GYN Problem/Follow Up Visit    Chief Complaint   Patient presents with    Follow-up     Medication check           HPI  Sunitha Ly is a 44 y.o. female, , who presents for follow up after 3 month trial norethindrone for management of ovarian cysts. S/p supracervical hysterectomy. Reports no further pelvic pain, doing well on current treatment.     norethindrone (MICRONOR) 0.35 MG tablet (2023)  US Non-ob Transvaginal (2023 16:12)     History of rectal hemorrhoids, using preparation h for recent flair, continue to experience rectal itch     Additional OB/GYN History   Patient's last menstrual period was 2014 (lmp unknown).  Current contraception: contraceptive methods: s/p hysterectomy    Past Medical History:   Diagnosis Date    Abnormal Pap smear of cervix     ADHD     Anxiety and depression     Arthritis     Chlamydia     Diabetes mellitus     Endometriosis     Gonorrhea     Kidney stone     Migraine without aura     Ovarian cyst     Periarthritis of shoulder     Placenta accreta     Urinary tract infection       Past Surgical History:   Procedure Laterality Date     SECTION      3    CHOLECYSTECTOMY      CYSTOSCOPY BLADDER STONE LITHOTRIPSY      HYSTERECTOMY      emergency post partum, Placenta accreta, supra cervical    WISDOM TOOTH EXTRACTION        Family History   Problem Relation Age of Onset    Hypertension Father     Sleep apnea Father     Breast cancer Mother 52    Cancer Mother         Breaat Cancer metastasized to her liver in 2022. The cancer lesions on her liver caused liver failure late 2023-early 2023 and she  2023.    Hypertension Brother     Melanoma Paternal Grandfather 80    Prostate cancer Maternal Grandfather     Uterine cancer Neg Hx     Ovarian cancer Neg Hx     Colon cancer Neg Hx      Allergies as of 2024    (No Known Allergies)      The additional following portions of the patient's history were  "reviewed and updated as appropriate: allergies, current medications, past family history, past medical history, past social history, past surgical history, and problem list.    Review of Systems    See HPI for pertinent ROS    Objective   /87   Pulse 85   Ht 152.4 cm (60\")   Wt 108 kg (238 lb)   LMP 01/14/2014 (LMP Unknown)   BMI 46.48 kg/m²     Physical Exam  Vitals and nursing note reviewed.   Constitutional:       Appearance: Normal appearance. She is well-developed and well-groomed.   Cardiovascular:      Rate and Rhythm: Normal rate.   Pulmonary:      Effort: Pulmonary effort is normal.   Skin:     General: Skin is warm and dry.   Neurological:      Mental Status: She is alert and oriented to person, place, and time.   Psychiatric:         Mood and Affect: Affect normal.         Cognition and Memory: Cognition normal.          Assessment and Plan    Diagnoses and all orders for this visit:    1. Hemorrhoids, unspecified hemorrhoid type (Primary)  -     Hydrocortisone, Perianal, (Anusol-HC) 2.5 % rectal cream; Apply rectally 2 times daily  Dispense: 30 g; Refill: 1    2. Cyst of left ovary        Counseling:  All treatment options with regard to pt hx NLT hormonal, surgical, expectant R/B/A/SE/E   Desires to continue norethindrone for management ovarian cysts  For management of hemorrhoids recommend avoid straining with bowel movements, over the counter stool softener, increase water and fiber. Hydrocortisone topical. Follow up with PCP for further evaluation should her symptoms continue or worsen.    Follow Up:  Return if symptoms worsen or fail to improve.        Leanne Jimenez, APRN  03/08/2024  "

## 2024-03-08 ENCOUNTER — OFFICE VISIT (OUTPATIENT)
Dept: OBSTETRICS AND GYNECOLOGY | Facility: CLINIC | Age: 44
End: 2024-03-08
Payer: COMMERCIAL

## 2024-03-08 VITALS
SYSTOLIC BLOOD PRESSURE: 128 MMHG | HEIGHT: 60 IN | HEART RATE: 85 BPM | WEIGHT: 238 LBS | BODY MASS INDEX: 46.72 KG/M2 | DIASTOLIC BLOOD PRESSURE: 87 MMHG

## 2024-03-08 DIAGNOSIS — K64.9 HEMORRHOIDS, UNSPECIFIED HEMORRHOID TYPE: Primary | ICD-10-CM

## 2024-03-08 DIAGNOSIS — N83.202 CYST OF LEFT OVARY: ICD-10-CM

## 2024-03-08 RX ORDER — HYDROCORTISONE 25 MG/G
CREAM TOPICAL
Qty: 30 G | Refills: 1 | Status: SHIPPED | OUTPATIENT
Start: 2024-03-08 | End: 2025-03-08

## 2024-03-26 ENCOUNTER — HOSPITAL ENCOUNTER (OUTPATIENT)
Dept: SLEEP MEDICINE | Facility: HOSPITAL | Age: 44
Discharge: HOME | End: 2024-03-26
Admitting: FAMILY MEDICINE
Payer: COMMERCIAL

## 2024-03-26 DIAGNOSIS — R29.818 SUSPECTED SLEEP APNEA: ICD-10-CM

## 2024-03-26 DIAGNOSIS — G47.19 EXCESSIVE DAYTIME SLEEPINESS: ICD-10-CM

## 2024-03-26 DIAGNOSIS — R06.81 WITNESSED EPISODE OF APNEA: ICD-10-CM

## 2024-03-26 DIAGNOSIS — R06.83 SNORING: ICD-10-CM

## 2024-03-26 PROCEDURE — 95806 SLEEP STUDY UNATT&RESP EFFT: CPT

## 2024-04-05 DIAGNOSIS — G47.34 SLEEP RELATED HYPOXIA: ICD-10-CM

## 2024-04-05 DIAGNOSIS — G47.33 OBSTRUCTIVE SLEEP APNEA, ADULT: Primary | ICD-10-CM

## 2024-04-11 ENCOUNTER — APPOINTMENT (OUTPATIENT)
Dept: CT IMAGING | Facility: HOSPITAL | Age: 44
End: 2024-04-11
Payer: COMMERCIAL

## 2024-04-11 ENCOUNTER — HOSPITAL ENCOUNTER (EMERGENCY)
Facility: HOSPITAL | Age: 44
Discharge: HOME | End: 2024-04-11
Attending: EMERGENCY MEDICINE
Payer: COMMERCIAL

## 2024-04-11 VITALS
HEART RATE: 63 BPM | TEMPERATURE: 97.9 F | HEIGHT: 60 IN | RESPIRATION RATE: 18 BRPM | SYSTOLIC BLOOD PRESSURE: 130 MMHG | DIASTOLIC BLOOD PRESSURE: 51 MMHG | WEIGHT: 245.81 LBS | BODY MASS INDEX: 48.26 KG/M2 | OXYGEN SATURATION: 100 %

## 2024-04-11 DIAGNOSIS — N83.202 CYST OF LEFT OVARY: Primary | ICD-10-CM

## 2024-04-11 DIAGNOSIS — D18.03 HEMANGIOMA OF INTRA-ABDOMINAL STRUCTURE: ICD-10-CM

## 2024-04-11 LAB
ALBUMIN SERPL-MCNC: 4.2 G/DL (ref 3.5–5.2)
ALBUMIN/GLOB SERPL: 1.4 G/DL
ALP SERPL-CCNC: 59 U/L (ref 39–117)
ALT SERPL W P-5'-P-CCNC: 21 U/L (ref 1–33)
ANION GAP SERPL CALCULATED.3IONS-SCNC: 10.1 MMOL/L (ref 5–15)
AST SERPL-CCNC: 13 U/L (ref 1–32)
BASOPHILS # BLD AUTO: 0.04 10*3/MM3 (ref 0–0.2)
BASOPHILS NFR BLD AUTO: 0.7 % (ref 0–1.5)
BILIRUB SERPL-MCNC: 0.4 MG/DL (ref 0–1.2)
BILIRUB UR QL STRIP: NEGATIVE
BUN SERPL-MCNC: 11 MG/DL (ref 6–20)
BUN/CREAT SERPL: 14.3 (ref 7–25)
CALCIUM SPEC-SCNC: 8.9 MG/DL (ref 8.6–10.5)
CHLORIDE SERPL-SCNC: 103 MMOL/L (ref 98–107)
CLARITY UR: CLEAR
CO2 SERPL-SCNC: 25.9 MMOL/L (ref 22–29)
COLOR UR: YELLOW
CREAT SERPL-MCNC: 0.77 MG/DL (ref 0.57–1)
DEPRECATED RDW RBC AUTO: 38.9 FL (ref 37–54)
EGFRCR SERPLBLD CKD-EPI 2021: 98.9 ML/MIN/1.73
EOSINOPHIL # BLD AUTO: 0.17 10*3/MM3 (ref 0–0.4)
EOSINOPHIL NFR BLD AUTO: 2.9 % (ref 0.3–6.2)
ERYTHROCYTE [DISTWIDTH] IN BLOOD BY AUTOMATED COUNT: 12.9 % (ref 12.3–15.4)
GLOBULIN UR ELPH-MCNC: 2.9 GM/DL
GLUCOSE SERPL-MCNC: 118 MG/DL (ref 65–99)
GLUCOSE UR STRIP-MCNC: NEGATIVE MG/DL
HCT VFR BLD AUTO: 44.4 % (ref 34–46.6)
HGB BLD-MCNC: 14.9 G/DL (ref 12–15.9)
HGB UR QL STRIP.AUTO: NEGATIVE
HOLD SPECIMEN: NORMAL
HOLD SPECIMEN: NORMAL
IMM GRANULOCYTES # BLD AUTO: 0.04 10*3/MM3 (ref 0–0.05)
IMM GRANULOCYTES NFR BLD AUTO: 0.7 % (ref 0–0.5)
KETONES UR QL STRIP: NEGATIVE
LEUKOCYTE ESTERASE UR QL STRIP.AUTO: NEGATIVE
LIPASE SERPL-CCNC: 23 U/L (ref 13–60)
LYMPHOCYTES # BLD AUTO: 1.49 10*3/MM3 (ref 0.7–3.1)
LYMPHOCYTES NFR BLD AUTO: 25.4 % (ref 19.6–45.3)
MCH RBC QN AUTO: 28 PG (ref 26.6–33)
MCHC RBC AUTO-ENTMCNC: 33.6 G/DL (ref 31.5–35.7)
MCV RBC AUTO: 83.3 FL (ref 79–97)
MONOCYTES # BLD AUTO: 0.36 10*3/MM3 (ref 0.1–0.9)
MONOCYTES NFR BLD AUTO: 6.1 % (ref 5–12)
NEUTROPHILS NFR BLD AUTO: 3.76 10*3/MM3 (ref 1.7–7)
NEUTROPHILS NFR BLD AUTO: 64.2 % (ref 42.7–76)
NITRITE UR QL STRIP: NEGATIVE
NRBC BLD AUTO-RTO: 0 /100 WBC (ref 0–0.2)
PH UR STRIP.AUTO: 6 [PH] (ref 5–8)
PLATELET # BLD AUTO: 170 10*3/MM3 (ref 140–450)
PMV BLD AUTO: 10.9 FL (ref 6–12)
POTASSIUM SERPL-SCNC: 4.3 MMOL/L (ref 3.5–5.2)
PROT SERPL-MCNC: 7.1 G/DL (ref 6–8.5)
PROT UR QL STRIP: NEGATIVE
RBC # BLD AUTO: 5.33 10*6/MM3 (ref 3.77–5.28)
SODIUM SERPL-SCNC: 139 MMOL/L (ref 136–145)
SP GR UR STRIP: 1.01 (ref 1–1.03)
UROBILINOGEN UR QL STRIP: NORMAL
WBC NRBC COR # BLD AUTO: 5.86 10*3/MM3 (ref 3.4–10.8)
WHOLE BLOOD HOLD COAG: NORMAL
WHOLE BLOOD HOLD SPECIMEN: NORMAL

## 2024-04-11 PROCEDURE — 80053 COMPREHEN METABOLIC PANEL: CPT | Performed by: EMERGENCY MEDICINE

## 2024-04-11 PROCEDURE — 85025 COMPLETE CBC W/AUTO DIFF WBC: CPT | Performed by: EMERGENCY MEDICINE

## 2024-04-11 PROCEDURE — 96374 THER/PROPH/DIAG INJ IV PUSH: CPT

## 2024-04-11 PROCEDURE — 74177 CT ABD & PELVIS W/CONTRAST: CPT

## 2024-04-11 PROCEDURE — 25010000002 KETOROLAC TROMETHAMINE PER 15 MG

## 2024-04-11 PROCEDURE — 99285 EMERGENCY DEPT VISIT HI MDM: CPT

## 2024-04-11 PROCEDURE — 25810000003 SODIUM CHLORIDE 0.9 % SOLUTION

## 2024-04-11 PROCEDURE — 83690 ASSAY OF LIPASE: CPT | Performed by: EMERGENCY MEDICINE

## 2024-04-11 PROCEDURE — 81003 URINALYSIS AUTO W/O SCOPE: CPT | Performed by: EMERGENCY MEDICINE

## 2024-04-11 PROCEDURE — 25510000001 IOPAMIDOL PER 1 ML: Performed by: EMERGENCY MEDICINE

## 2024-04-11 RX ORDER — KETOROLAC TROMETHAMINE 15 MG/ML
15 INJECTION, SOLUTION INTRAMUSCULAR; INTRAVENOUS ONCE
Status: COMPLETED | OUTPATIENT
Start: 2024-04-11 | End: 2024-04-11

## 2024-04-11 RX ORDER — SODIUM CHLORIDE 0.9 % (FLUSH) 0.9 %
10 SYRINGE (ML) INJECTION AS NEEDED
Status: DISCONTINUED | OUTPATIENT
Start: 2024-04-11 | End: 2024-04-11 | Stop reason: HOSPADM

## 2024-04-11 RX ORDER — KETOROLAC TROMETHAMINE 10 MG/1
10 TABLET, FILM COATED ORAL EVERY 6 HOURS PRN
Qty: 16 TABLET | Refills: 0 | Status: SHIPPED | OUTPATIENT
Start: 2024-04-11 | End: 2024-04-15

## 2024-04-11 RX ADMIN — KETOROLAC TROMETHAMINE 15 MG: 15 INJECTION, SOLUTION INTRAMUSCULAR; INTRAVENOUS at 10:14

## 2024-04-11 RX ADMIN — SODIUM CHLORIDE 1000 ML: 9 INJECTION, SOLUTION INTRAVENOUS at 10:13

## 2024-04-11 RX ADMIN — IOPAMIDOL 100 ML: 755 INJECTION, SOLUTION INTRAVENOUS at 10:23

## 2024-04-11 NOTE — ED PROVIDER NOTES
Time: 10:24 AM EDT  Date of encounter:  2024  Independent Historian/Clinical History and Information was obtained by:   Patient    History is limited by: N/A    Chief Complaint: Left flank pain      History of Present Illness:  Patient is a 44 y.o. year old female who presents to the emergency department for evaluation of left abdominal and flank pain that began 3 days ago.  Patient denies urinary symptoms.  Patient admits to a history of kidney stones.  Patient denies nausea/vomiting/diarrhea.  Patient denies fever.    HPI    Patient Care Team  Primary Care Provider: Radha Sethi APRN    Past Medical History:     No Known Allergies  Past Medical History:   Diagnosis Date    Abnormal Pap smear of cervix     ADHD     Anxiety and depression     Arthritis     Chlamydia     Diabetes mellitus     Endometriosis     Gonorrhea     Kidney stone     Migraine without aura     Ovarian cyst     Periarthritis of shoulder     Placenta accreta     Urinary tract infection      Past Surgical History:   Procedure Laterality Date     SECTION      3    CHOLECYSTECTOMY      CYSTOSCOPY BLADDER STONE LITHOTRIPSY      HYSTERECTOMY      emergency post partum, Placenta accreta, supra cervical    WISDOM TOOTH EXTRACTION       Family History   Problem Relation Age of Onset    Hypertension Father     Sleep apnea Father     Breast cancer Mother 52    Cancer Mother         Breaat Cancer metastasized to her liver in 2022. The cancer lesions on her liver caused liver failure late 2023-early 2023 and she  2023.    Hypertension Brother     Melanoma Paternal Grandfather 80    Prostate cancer Maternal Grandfather     Uterine cancer Neg Hx     Ovarian cancer Neg Hx     Colon cancer Neg Hx        Home Medications:  Prior to Admission medications    Medication Sig Start Date End Date Taking? Authorizing Provider   buPROPion XL (WELLBUTRIN XL) 150 MG 24 hr tablet TAKE 1 TABLET BY MOUTH  DAILY 1/2/24   Radha Sethi APRN   busPIRone (BUSPAR) 5 MG tablet TAKE ONE TABLET BY MOUTH THREE TIMES A DAY 12/4/23   Radha Sethi APRN   escitalopram (LEXAPRO) 10 MG tablet TAKE ONE TABLET BY MOUTH DAILY 12/4/23   Radha Sethi APRN   HYDROcodone-acetaminophen (NORCO) 7.5-325 MG per tablet Take 1 tablet by mouth Every 6 (Six) Hours As Needed for Moderate Pain.  Patient not taking: Reported on 2/26/2024 12/1/23   Benedicto Newsome DO   Hydrocortisone, Perianal, (Anusol-HC) 2.5 % rectal cream Apply rectally 2 times daily 3/8/24 3/8/25  Leanne Jimenez APRN   Insulin Pen Needle (Pen Needles) 31G X 6 MM misc Use 1 Needle Daily. 12/7/23   Radha Sethi APRN   Liraglutide (SAXENDA) 18 MG/3ML injection pen Inject 0.6mg under skin daily for week one, THEN 1.2mg daily for week two, THEN 1.8mg daily for week three, then 2.4mg daily for week four. 12/7/23   Radha Sethi APRN   metFORMIN ER (GLUCOPHAGE-XR) 500 MG 24 hr tablet TAKE ONE TABLET BY MOUTH DAILY WITH BREAKFAST 10/2/23   Radha Sethi APRN   Naproxen Sodium (ALEVE PO)     ProviderJoanne MD   norethindrone (MICRONOR) 0.35 MG tablet Take 1 tablet by mouth Daily. 12/8/23 12/7/24  Leanne Jimenez APRN   ondansetron (Zofran) 4 MG tablet Take 1 tablet by mouth Every 8 (Eight) Hours As Needed for Nausea or Vomiting. 12/12/23   Radha Sethi APRN   ondansetron ODT (ZOFRAN-ODT) 4 MG disintegrating tablet Place 1 tablet on the tongue Every 8 (Eight) Hours As Needed for Nausea or Vomiting. 12/7/23   Radha Sethi APRN   vitamin B-12 (CYANOCOBALAMIN) 1000 MCG tablet Take 1 tablet by mouth Daily.    ProviderJoanne MD   Vitamin D, Cholecalciferol, (CHOLECALCIFEROL) 10 MCG (400 UNIT) tablet Take 500 Units by mouth Daily.    Provider, Historical, MD        Social History:   Social History     Tobacco Use    Smoking status: Never    Smokeless tobacco: Never   Vaping Use    Vaping  "status: Never Used   Substance Use Topics    Alcohol use: Yes     Comment: I only drink socially and not much at any one time.    Drug use: Never         Review of Systems:  Review of Systems   Constitutional:  Negative for chills and fever.   HENT:  Negative for congestion, rhinorrhea and sore throat.    Eyes:  Negative for pain and visual disturbance.   Respiratory:  Negative for apnea, cough, chest tightness and shortness of breath.    Cardiovascular:  Negative for chest pain and palpitations.   Gastrointestinal:  Positive for abdominal pain. Negative for diarrhea, nausea and vomiting.   Genitourinary:  Positive for flank pain. Negative for difficulty urinating and dysuria.   Musculoskeletal:  Negative for joint swelling and myalgias.   Skin:  Negative for color change.   Neurological:  Negative for seizures and headaches.   Psychiatric/Behavioral: Negative.     All other systems reviewed and are negative.       Physical Exam:  /72   Pulse 68   Temp 97.9 °F (36.6 °C) (Oral)   Resp 18   Ht 152.4 cm (60\")   Wt 112 kg (245 lb 13 oz)   LMP 01/14/2014 (LMP Unknown)   SpO2 99%   BMI 48.01 kg/m²     Physical Exam  Vitals and nursing note reviewed.   Constitutional:       General: She is not in acute distress.     Appearance: Normal appearance. She is not toxic-appearing.   HENT:      Head: Normocephalic and atraumatic.      Jaw: There is normal jaw occlusion.   Eyes:      General: Lids are normal.      Extraocular Movements: Extraocular movements intact.      Conjunctiva/sclera: Conjunctivae normal.      Pupils: Pupils are equal, round, and reactive to light.   Cardiovascular:      Rate and Rhythm: Normal rate and regular rhythm.      Pulses: Normal pulses.      Heart sounds: Normal heart sounds.   Pulmonary:      Effort: Pulmonary effort is normal. No respiratory distress.      Breath sounds: Normal breath sounds. No wheezing or rhonchi.   Abdominal:      General: Abdomen is flat.      Palpations: Abdomen " is soft.      Tenderness: There is abdominal tenderness in the left lower quadrant. There is left CVA tenderness. There is no guarding or rebound.   Musculoskeletal:         General: Normal range of motion.      Cervical back: Normal range of motion and neck supple.      Right lower leg: No edema.      Left lower leg: No edema.   Skin:     General: Skin is warm and dry.   Neurological:      Mental Status: She is alert and oriented to person, place, and time. Mental status is at baseline.   Psychiatric:         Mood and Affect: Mood normal.                  Procedures:  Procedures      Medical Decision Making:      Comorbidities that affect care:    Diabetes    External Notes reviewed:          The following orders were placed and all results were independently analyzed by me:  Orders Placed This Encounter   Procedures    CT Abdomen Pelvis With Contrast    [place] Draw    Comprehensive Metabolic Panel    Lipase    Urinalysis With Microscopic If Indicated (No Culture) - Urine, Clean Catch    CBC Auto Differential    Ambulatory Referral to Obstetrics / Gynecology    NPO Diet NPO Type: Strict NPO    Undress & Gown    Insert Peripheral IV    CBC & Differential    Green Top (Gel)    Lavender Top    Gold Top - SST    Light Blue Top       Medications Given in the Emergency Department:  Medications   sodium chloride 0.9 % flush 10 mL (has no administration in time range)   ketorolac (TORADOL) injection 15 mg (15 mg Intravenous Given 4/11/24 1014)   sodium chloride 0.9 % bolus 1,000 mL (1,000 mL Intravenous New Bag 4/11/24 1013)   iopamidol (ISOVUE-370) 76 % injection 100 mL (100 mL Intravenous Given 4/11/24 1023)        ED Course:         Labs:    Lab Results (last 24 hours)       Procedure Component Value Units Date/Time    CBC & Differential [[id number]]  (Abnormal) Collected: 04/11/24 0928    Specimen: Blood Updated: 04/11/24 0946    Narrative:      The following orders were created for panel order CBC &  Differential.  Procedure                               Abnormality         Status                     ---------                               -----------         ------                     CBC Auto Differential[[id number]]        Abnormal            Final result                 Please view results for these tests on the individual orders.    Comprehensive Metabolic Panel [[id number]]  (Abnormal) Collected: 04/11/24 0928    Specimen: Blood Updated: 04/11/24 1012     Glucose 118 mg/dL      BUN 11 mg/dL      Creatinine 0.77 mg/dL      Sodium 139 mmol/L      Potassium 4.3 mmol/L      Chloride 103 mmol/L      CO2 25.9 mmol/L      Calcium 8.9 mg/dL      Total Protein 7.1 g/dL      Albumin 4.2 g/dL      ALT (SGPT) 21 U/L      AST (SGOT) 13 U/L      Alkaline Phosphatase 59 U/L      Total Bilirubin 0.4 mg/dL      Globulin 2.9 gm/dL      A/G Ratio 1.4 g/dL      BUN/Creatinine Ratio 14.3     Anion Gap 10.1 mmol/L      eGFR 98.9 mL/min/1.73     Narrative:      GFR Normal >60  Chronic Kidney Disease <60  Kidney Failure <15      Lipase [[id number]]  (Normal) Collected: 04/11/24 0928    Specimen: Blood Updated: 04/11/24 1012     Lipase 23 U/L     CBC Auto Differential [[id number]]  (Abnormal) Collected: 04/11/24 0928    Specimen: Blood Updated: 04/11/24 0946     WBC 5.86 10*3/mm3      RBC 5.33 10*6/mm3      Hemoglobin 14.9 g/dL      Hematocrit 44.4 %      MCV 83.3 fL      MCH 28.0 pg      MCHC 33.6 g/dL      RDW 12.9 %      RDW-SD 38.9 fl      MPV 10.9 fL      Platelets 170 10*3/mm3      Neutrophil % 64.2 %      Lymphocyte % 25.4 %      Monocyte % 6.1 %      Eosinophil % 2.9 %      Basophil % 0.7 %      Immature Grans % 0.7 %      Neutrophils, Absolute 3.76 10*3/mm3      Lymphocytes, Absolute 1.49 10*3/mm3      Monocytes, Absolute 0.36 10*3/mm3      Eosinophils, Absolute 0.17 10*3/mm3      Basophils, Absolute 0.04 10*3/mm3      Immature Grans, Absolute 0.04 10*3/mm3      nRBC 0.0 /100 WBC     Urinalysis With Microscopic If Indicated  (No Culture) - Urine, Clean Catch [[id number]]  (Normal) Collected: 04/11/24 0946    Specimen: Urine, Clean Catch Updated: 04/11/24 1013     Color, UA Yellow     Appearance, UA Clear     pH, UA 6.0     Specific Gravity, UA 1.006     Glucose, UA Negative     Ketones, UA Negative     Bilirubin, UA Negative     Blood, UA Negative     Protein, UA Negative     Leuk Esterase, UA Negative     Nitrite, UA Negative     Urobilinogen, UA 0.2 E.U./dL    Narrative:      Urine microscopic not indicated.             Imaging:    CT Abdomen Pelvis With Contrast    Result Date: 4/11/2024  CT ABDOMEN PELVIS W CONTRAST-  Date of Exam: 4/11/2024 10:18 AM  Indication: suprapubic/left flank pain, hx of stones.  Comparison: CT abdomen pelvis with contrast dated 12/1/2023  Technique: Axial CT images were obtained of the abdomen and pelvis following the uneventful intravenous administration of iodinated contrast. Reconstructed coronal and sagittal images were also obtained. Automated exposure control and iterative construction methods were used.   Findings: Lung bases are clear bilaterally.  In the left hepatic lobe is a 3.7 cm x 2.5 cm lesion. It demonstrates a nodular focus of enhancement measured about 1.1 cm. The lesion was present previously at which time it measured 2.8 cm x 2.3 cm. A cholecystectomy has been performed. The spleen, pancreas and adrenal glands appear unremarkable. There is a 0.7 cm nonobstructing right renal stone. The kidneys otherwise appear unremarkable. No ureteral stone is seen.  There is a mildly enlarged right external iliac lymph node measuring 1.2 cm in short axis, unchanged in the interval. No free fluid is seen in the abdomen or pelvis. There is a cystic lesion arising from the left ovary measuring 6.5 cm x 5.7 cm, previously 5.9 cm x 5.2 cm. There is some vague infiltration of the fat superior to the cyst which was noted previously. The right ovary appears unremarkable. A hysterectomy has been performed. The  urinary bladder appears normal.  No acute bowel abnormality is seen. The lack of oral contrast limits evaluation of the bowel. No hernia is seen.  No focal osseous lesion is seen.      Impression: 1.  Enlarging left ovarian cyst now measuring 6.5 cm x 5.7 cm. Consider follow-up pelvic ultrasound to further evaluate. 2.  Previous hysterectomy. 3.  3.7 cm x 2.5 cm mass in the left hepatic lobe with nodular peripheral enhancement. Finding is favored to represent a hemangioma. Other possible etiologies such as angiosarcoma are considered much less likely. Consider abdomen MRI given the apparent interval growth.  Electronically Signed By-Josh Jerez MD On:4/11/2024 11:08 AM         Differential Diagnosis and Discussion:    Abdominal Pain: Based on the patient's signs and symptoms, I considered abdominal aortic aneurysm, small bowel obstruction, pancreatitis, acute cholecystitis, acute appendecitis, peptic ulcer disease, gastritis, colitis, endocrine disorders, irritable bowel syndrome and other differential diagnosis an etiology of the patient's abdominal pain.  Flank Pain: Differential diagnosis includes but is not limited to kidney stones, pyelonephritis, musculoskeletal disorders, renal infarction, urinary tract infection, hydronephrosis, radiculopathy, aortic aneurysm, renal cell carcinoma.    All labs were reviewed and interpreted by me.  CT scan radiology impression was interpreted by me.    MDM     Amount and/or Complexity of Data Reviewed  Clinical lab tests: reviewed       The patient´s CBC that was reviewed and interpreted by me shows no abnormalities of critical concern. Of note, there is no anemia requiring a blood transfusion and the platelet count is acceptable.  The patient´s CMP that was reviewed and interpretted by me shows no abnormalities of critical concern. Of note, the patient´s sodium and potassium are acceptable. The patient´s liver enzymes are unremarkable. The patient´s renal function  (creatinine) is preserved. The patient has a normal anion gap.  Urinalysis shows no evidence of UTI.  CT abdomen pelvis with contrast shows Enlarging left ovarian cyst now measuring 6.5 cm x 5.7 cm.  I will provide an ambulatory referral to OB/GYN for follow-up.  CT abdomen pelvis also shows hepatic nodule consistent with hemangioma.  Patient states she is already aware of this nodule and had an MRI done and is following with her PCP as she stated it was benign.  Instructed patient to return to ED if she develops any new or worsening symptoms.  Patient is resting comfortably at this time and states she understands and agrees with plan of care was to go home.          Patient Care Considerations:          Consultants/Shared Management Plan:    None    Social Determinants of Health:    Patient is independent, reliable, and has access to care.       Disposition and Care Coordination:    Discharged: The patient is suitable and stable for discharge with no need for consideration of admission.    I have explained the patient´s condition, diagnoses and treatment plan based on the information available to me at this time. I have answered questions and addressed any concerns. The patient has a good  understanding of the patient´s diagnosis, condition, and treatment plan as can be expected at this point. The vital signs have been stable. The patient´s condition is stable and appropriate for discharge from the emergency department.      The patient will pursue further outpatient evaluation with the primary care physician or other designated or consulting physician as outlined in the discharge instructions. They are agreeable to this plan of care and follow-up instructions have been explained in detail. The patient has received these instructions in written format and has expressed an understanding of the discharge instructions. The patient is aware that any significant change in condition or worsening of symptoms should prompt  an immediate return to this or the closest emergency department or call to 911.  I have explained discharge medications and the need for follow up with the patient/caretakers. This was also printed in the discharge instructions. Patient was discharged with the following medications and follow up:      Medication List      No changes were made to your prescriptions during this visit.      Karla Metz,   [facility] [place] [address]  [phone]    Call in 1 day  To schedule appointment for follow-up       Final diagnoses:   Cyst of left ovary   Hemangioma of intra-abdominal structure        ED Disposition       ED Disposition   Discharge    Condition   Stable    Comment   --               This medical record created using voice recognition software.             Tae Osborn PA-C  04/11/24 1121

## 2024-04-17 ENCOUNTER — TELEPHONE (OUTPATIENT)
Dept: OBSTETRICS AND GYNECOLOGY | Facility: CLINIC | Age: 44
End: 2024-04-17
Payer: COMMERCIAL

## 2024-04-18 ENCOUNTER — DOCUMENTATION (OUTPATIENT)
Dept: SLEEP MEDICINE | Facility: HOSPITAL | Age: 44
End: 2024-04-18
Payer: COMMERCIAL

## 2024-04-18 ENCOUNTER — OFFICE VISIT (OUTPATIENT)
Dept: OBSTETRICS AND GYNECOLOGY | Facility: CLINIC | Age: 44
End: 2024-04-18
Payer: COMMERCIAL

## 2024-04-18 VITALS
SYSTOLIC BLOOD PRESSURE: 123 MMHG | HEIGHT: 60 IN | BODY MASS INDEX: 47.12 KG/M2 | HEART RATE: 65 BPM | WEIGHT: 240 LBS | DIASTOLIC BLOOD PRESSURE: 81 MMHG

## 2024-04-18 DIAGNOSIS — G47.33 OBSTRUCTIVE SLEEP APNEA, ADULT: Primary | ICD-10-CM

## 2024-04-18 DIAGNOSIS — N83.202 CYST OF LEFT OVARY: Primary | ICD-10-CM

## 2024-04-18 DIAGNOSIS — E66.01 CLASS 3 SEVERE OBESITY DUE TO EXCESS CALORIES WITHOUT SERIOUS COMORBIDITY WITH BODY MASS INDEX (BMI) OF 45.0 TO 49.9 IN ADULT: ICD-10-CM

## 2024-04-18 PROCEDURE — 99214 OFFICE O/P EST MOD 30 MIN: CPT | Performed by: OBSTETRICS & GYNECOLOGY

## 2024-04-18 NOTE — PROGRESS NOTES
"GYN Visit    Chief Complaint   Patient presents with    Ovarian Cyst     ER f/u on 24 for pelvic pain, Imaging done       HPI:   44 y.o. here to f/u left ovarian cyst. Pt is s/p supracervical hysterectomy at time of CS due to accreta.  Pt states has LLQ pain off and on.  She has a history of ovarian cysts and renal stones.  Pt has had ultrasounds and CT scans revealing persistent ovarian cyst.  Pt states seen in December by NP and stated on progesterone only pills to suppress ovarian cyst. Pt had a known cyst at that time. Pt states had severe pain few weeks ago and thought had kidney stone. She was seen in ER and told had ovarian cyst. Pt states has been taking pain medications and pain is better.  Pt here to discuss definitive management as has had this cyst for a long time.      History: PMHx, Meds, Allergies, PSHx, Social Hx, and POBHx all reviewed and updated.    PHYSICAL EXAM:  /81   Pulse 65   Ht 152.4 cm (60\")   Wt 109 kg (240 lb)   LMP 2014 (LMP Unknown)   Breastfeeding No   BMI 46.87 kg/m² Chaperone present  General- NAD, alert and oriented, appropriate  Psych- Normal mood, good memory      External genitalia- Normal female, no lesions  Urethra/meatus- Normal, no masses, non tender, no prolapse  Bladder- Normal, no masses, non tender, no prolapse  Vagina- Normal, no atrophy, no lesions, no discharge, no prolapse  Cvx- Absent  Uterus- Absent  Adnexa-  no obvious masses appreciated but exam limited by body habitus, Tender on LEFT  Anus/Rectum/Perineum- Not performed    Lymphatic- No palpable neck, axillary, or groin nodes  Ext- No edema, no cyanosis    Skin- No lesions, no rashes, no acanthosis nigricans      CT Abdomen Pelvis With Contrast (2023 13:25)    US Non-ob Transvaginal (2023 16:12)    CT Abdomen Pelvis With Contrast (2024 10:22)  CT Abdomen Pelvis Without Contrast (10/28/2021 10:44)    US Non-ob Transvaginal (10/28/2021 12:59)    US Pelvis " Transvaginal Non OB (12/14/2021 13:43)  ASSESSMENT AND PLAN:  Diagnoses and all orders for this visit:    1. Cyst of left ovary (Primary)  -     Case Request; Standing  -     Case Request    2. Class 3 severe obesity due to excess calories without serious comorbidity with body mass index (BMI) of 45.0 to 49.9 in adult    Other orders  -     VTE Prophylaxis Not Indicated:; Standing    Discussed and reviewed all of the imaging studies with patient. I do not believe that progesterone only pills suppress ovarian cyst as we know that patient using progesterone IUD's or progesterone implants and depo provera often present with ovarian cysts.  This patient already had a cyst so using OCP's or other hormones would not cause the cyst to resolve.  Pt seen multiple times in ER and has had multiple scans and the patient and I both believe something needs to be done.  We have discussed oophorectomy on the left as long as the right ovary appears normal.  We have discussed complications in terms of her obesity and previous abdominal surgeries. The patient was counseled to the risks and benefits of this procedure to include infection, bleeding, damage to internal organs, bowels, bladder, blood vessels, other internal organs requiring additional surgery to repair or remove any damaged structures.  If there is damage to the bowels may require exploratory laparotomy with primary repair for bowel injury.  If there is damage to the bladder may require exploratory laparotomy with primary repair of bladder injury going home with an indwelling Rutherford catheter to be removed at a later date.  If there is damage to the ureters may require exploratory laparotomy with primary repair of ureteral injury going home with an indwelling ureteral stent to be removed at a later date.  Patient is to have complications that are recognized at the time of surgery may have a change in the original operation as well as the postoperative stay in the hospital  but usually will make a full recovery.  Patient is to have complications that occur that are either not recognized or occur in the postoperative period may have a prolonged recovery period and sometimes an incomplete recovery.  The patient was given the opportunity to ask questions and her questions were answered to her satisfaction and she desires to proceed. She is a  and desires to wait until school year is over.        Follow Up:  No follow-ups on file.          Iraida Anderson,   04/18/2024    [facility]  [address] [place] [address]  Dept: [phone]  Dept Fax: [phone]  Loc: [phone]  Loc Fax: [phone]

## 2024-04-18 NOTE — PROGRESS NOTES
Date 4/18/2024  Informed today by sleep staff titration study refused by insurance provider      2/26/2024 clinic notes reviewed    3/26/2024 HST reviewed    To prevent any further unnecessary delay care by the patient's discharge provider the following plan:    - Start the patient on auto CPAP 8-20 cm H2O, EPR 3, ramp off  - Follow-up with me for therapy review 31-9 days after starting on auto CPAP  - Only I we will determine future modality/time appropriate after in-clinic visits for clinical correlation for future testing      NPI #: [id number]    Caitlin Stubbs, DO  Sleep Medicine  [facility]  04/18/24

## 2024-04-19 ENCOUNTER — TELEPHONE (OUTPATIENT)
Dept: SLEEP MEDICINE | Facility: HOSPITAL | Age: 44
End: 2024-04-19

## 2024-04-19 ENCOUNTER — TELEPHONE (OUTPATIENT)
Dept: SLEEP MEDICINE | Facility: HOSPITAL | Age: 44
End: 2024-04-19
Payer: COMMERCIAL

## 2024-04-23 DIAGNOSIS — F32.A DEPRESSION, UNSPECIFIED DEPRESSION TYPE: ICD-10-CM

## 2024-04-23 DIAGNOSIS — F41.9 ANXIETY: ICD-10-CM

## 2024-04-23 RX ORDER — ESCITALOPRAM OXALATE 10 MG/1
10 TABLET ORAL DAILY
Qty: 30 TABLET | Refills: 2 | Status: SHIPPED | OUTPATIENT
Start: 2024-04-23

## 2024-04-23 RX ORDER — BUSPIRONE HYDROCHLORIDE 5 MG/1
5 TABLET ORAL 3 TIMES DAILY
Qty: 90 TABLET | Refills: 2 | Status: SHIPPED | OUTPATIENT
Start: 2024-04-23

## 2024-05-06 ENCOUNTER — OFFICE VISIT (OUTPATIENT)
Dept: ORTHOPEDIC SURGERY | Facility: CLINIC | Age: 44
End: 2024-05-06
Payer: COMMERCIAL

## 2024-05-06 VITALS
BODY MASS INDEX: 47.12 KG/M2 | WEIGHT: 240 LBS | SYSTOLIC BLOOD PRESSURE: 132 MMHG | DIASTOLIC BLOOD PRESSURE: 83 MMHG | HEIGHT: 60 IN | HEART RATE: 64 BPM | OXYGEN SATURATION: 98 %

## 2024-05-06 DIAGNOSIS — M25.512 CHRONIC PAIN OF BOTH SHOULDERS: Primary | ICD-10-CM

## 2024-05-06 DIAGNOSIS — M25.511 CHRONIC PAIN OF BOTH SHOULDERS: Primary | ICD-10-CM

## 2024-05-06 DIAGNOSIS — G89.29 CHRONIC PAIN OF BOTH SHOULDERS: Primary | ICD-10-CM

## 2024-05-06 PROCEDURE — 20610 DRAIN/INJ JOINT/BURSA W/O US: CPT

## 2024-05-06 RX ORDER — LIDOCAINE HYDROCHLORIDE 10 MG/ML
5 INJECTION, SOLUTION INFILTRATION; PERINEURAL
Status: COMPLETED | OUTPATIENT
Start: 2024-05-06 | End: 2024-05-06

## 2024-05-06 RX ORDER — TRIAMCINOLONE ACETONIDE 40 MG/ML
40 INJECTION, SUSPENSION INTRA-ARTICULAR; INTRAMUSCULAR
Status: COMPLETED | OUTPATIENT
Start: 2024-05-06 | End: 2024-05-06

## 2024-05-06 RX ADMIN — TRIAMCINOLONE ACETONIDE 40 MG: 40 INJECTION, SUSPENSION INTRA-ARTICULAR; INTRAMUSCULAR at 15:10

## 2024-05-06 RX ADMIN — TRIAMCINOLONE ACETONIDE 40 MG: 40 INJECTION, SUSPENSION INTRA-ARTICULAR; INTRAMUSCULAR at 15:09

## 2024-05-06 RX ADMIN — LIDOCAINE HYDROCHLORIDE 5 ML: 10 INJECTION, SOLUTION INFILTRATION; PERINEURAL at 15:09

## 2024-05-06 RX ADMIN — LIDOCAINE HYDROCHLORIDE 5 ML: 10 INJECTION, SOLUTION INFILTRATION; PERINEURAL at 15:10

## 2024-06-06 ENCOUNTER — TRANSCRIBE ORDERS (OUTPATIENT)
Dept: ADMINISTRATIVE | Facility: HOSPITAL | Age: 44
End: 2024-06-06
Payer: COMMERCIAL

## 2024-06-06 DIAGNOSIS — Z12.31 SCREENING MAMMOGRAM FOR BREAST CANCER: Primary | ICD-10-CM

## 2024-06-20 ENCOUNTER — HOSPITAL ENCOUNTER (OUTPATIENT)
Dept: MAMMOGRAPHY | Facility: HOSPITAL | Age: 44
Discharge: HOME | End: 2024-06-20
Admitting: OBSTETRICS & GYNECOLOGY
Payer: COMMERCIAL

## 2024-06-20 DIAGNOSIS — Z12.31 SCREENING MAMMOGRAM FOR BREAST CANCER: ICD-10-CM

## 2024-06-20 PROBLEM — N83.202 CYST OF LEFT OVARY: Status: ACTIVE | Noted: 2024-04-18

## 2024-06-20 PROCEDURE — 77067 SCR MAMMO BI INCL CAD: CPT

## 2024-06-20 PROCEDURE — 77063 BREAST TOMOSYNTHESIS BI: CPT

## 2024-06-24 PROCEDURE — S0260 H&P FOR SURGERY: HCPCS | Performed by: OBSTETRICS & GYNECOLOGY

## 2024-06-24 NOTE — H&P
[facility]   HISTORY AND PHYSICAL    Patient Name:Sunitha Ly  : 1980  MRN: [id number]  Primary Care Physician: Radha Sethi APRN  Date of admission: (Not on file)    Subjective   Subjective     Chief Complaint: here for surgery    History of Present Illness   Sunitha Ly is a 44 y.o. female . here for removal of left ovarian cyst. Pt is s/p supracervical hysterectomy at time of CS due to accreta.  Pt states has LLQ pain off and on.  She has a history of ovarian cysts and renal stones.  Pt has had ultrasounds and CT scans revealing persistent ovarian cyst.  Pt states seen in December by NP and stated on progesterone only pills to suppress ovarian cyst. Pt had a known cyst at that time. Pt states had severe pain few weeks ago and thought had kidney stone. She was seen in ER and told had ovarian cyst. Pt states has been taking pain medications and pain is better.  Pt here to discuss definitive management as has had this cyst for a long time.     Review of Systems   Constitutional: Negative.    HENT: Negative.     Eyes: Negative.    Respiratory: Negative.     Cardiovascular: Negative.    Gastrointestinal: Negative.    Endocrine: Negative.    Genitourinary: Negative.    Skin: Negative.    Allergic/Immunologic: Negative.    Neurological: Negative.    Hematological: Negative.    Psychiatric/Behavioral: Negative.           Personal History     Past Medical History:   Diagnosis Date    Abnormal Pap smear of cervix     ADHD     Anxiety and depression     Arthritis     Chlamydia     Diabetes mellitus     PRE DIABETES- TAKES MEDS- DOESN'T CHECK BG AT HOME    Endometriosis     Gonorrhea     Kidney stone     Left ovarian cyst     Migraine without aura     Ovarian cyst     Periarthritis of shoulder     Placenta accreta     Urinary tract infection        Past Surgical History:   Procedure Laterality Date     SECTION      3    CHOLECYSTECTOMY      CYSTOSCOPY BLADDER STONE  LITHOTRIPSY      HYSTERECTOMY  2014    emergency post partum, Placenta accreta, supra cervical    WISDOM TOOTH EXTRACTION         Family History: Her family history includes Breast cancer in her mother; Hypertension in her brother and father; Melanoma in her paternal grandfather; Prostate cancer in her maternal grandfather; Sleep apnea in her father.     Social History: She  reports that she has never smoked. She has never used smokeless tobacco. She reports current alcohol use. She reports that she does not use drugs.    Home Medications:  Hydrocortisone (Perianal), Pen Needles, Tirzepatide-Weight Management, Vitamin D (Cholecalciferol), buPROPion XL, busPIRone, escitalopram, metFORMIN ER, ondansetron ODT, and vitamin B-12    Allergies:  She has No Known Allergies.    Objective    Objective     Vitals:         Physical Exam  Vitals and nursing note reviewed. Exam conducted with a chaperone present.   Constitutional:       Appearance: Normal appearance. She is obese.   HENT:      Head: Normocephalic.   Cardiovascular:      Rate and Rhythm: Normal rate and regular rhythm.   Pulmonary:      Effort: Pulmonary effort is normal.      Breath sounds: Normal breath sounds.   Abdominal:      General: Bowel sounds are normal.      Palpations: Abdomen is soft. There is no mass.      Tenderness: There is no abdominal tenderness.      Hernia: No hernia is present.   Genitourinary:     Comments: Nml female external genitalia.  Cervix& Uterus absent. Cuff is intact  No adnexal masses are appreciated    Musculoskeletal:         General: Normal range of motion.      Cervical back: Normal range of motion.   Skin:     General: Skin is warm and dry.   Neurological:      General: No focal deficit present.      Mental Status: She is alert and oriented to person, place, and time.   Psychiatric:         Mood and Affect: Mood normal.         Behavior: Behavior normal.          Result Review    Result Review:  I have personally reviewed the  results from the time of this admission to 6/24/2024 16:56 EDT and agree with these findings:  []  Laboratory list / accordion  []  Microbiology  []  Radiology  []  EKG/Telemetry   []  Cardiology/Vascular   []  Pathology  []  Old records  []  Other:  Most notable findings include:       Assessment & Plan   Assessment / Plan     Brief Patient Summary:  Sunitha Ly is a 44 y.o. female here for removal of ovary    Active Hospital Problems:  Active Hospital Problems    Diagnosis     **Cyst of left ovary      Plan:   We have discussed oophorectomy on the left as long as the right ovary appears normal.  We have discussed complications in terms of her obesity and previous abdominal surgeries. The patient was counseled to the risks and benefits of this procedure to include infection, bleeding, damage to internal organs, bowels, bladder, blood vessels, other internal organs requiring additional surgery to repair or remove any damaged structures.  If there is damage to the bowels may require exploratory laparotomy with primary repair for bowel injury.  If there is damage to the bladder may require exploratory laparotomy with primary repair of bladder injury going home with an indwelling Rutherford catheter to be removed at a later date.  If there is damage to the ureters may require exploratory laparotomy with primary repair of ureteral injury going home with an indwelling ureteral stent to be removed at a later date.  Patient is to have complications that are recognized at the time of surgery may have a change in the original operation as well as the postoperative stay in the hospital but usually will make a full recovery.  Patient is to have complications that occur that are either not recognized or occur in the postoperative period may have a prolonged recovery period and sometimes an incomplete recovery.  The patient was given the opportunity to ask questions and her questions were answered to her satisfaction and she  desires to proceed     VTE Prophylaxis:  No VTE prophylaxis order currently exists.        Iraida Anderson, DO

## 2024-06-24 NOTE — PRE-PROCEDURE INSTRUCTIONS
PATIENT INSTRUCTED TO BE:    - NOTHING TO EAT AFTER MIDNIGHT OR CHEW, EXCEPT CAN HAVE CLEAR LIQUIDS 2 HOURS PRIOR TO SURGERY ARRIVAL TIME , NO MORE THAN 8 OZ. (NOTHING RED)     - TO HOLD ALL VITAMINS, SUPPLEMENTS, NSAIDS FOR ONE WEEK PRIOR TO THEIR SURGICAL PROCEDURE    - DO NOT TAKE ___ZEPBOUND    7 DAYS PRIOR TO PROCEDURE PER ANESTHESIA RECOMMENDATIONS/INSTRUCTIONS (PT REPORTED SHE HAS ALREADY STOPPED HER MEDIATIONS FOR THE PROCEDURE WITH LAST DOSE BEING 6-11-24)    - INSTRUCTED PT TO USE SURGICAL SOAP 1 TIME THE NIGHT PRIOR TO SURGERY _5-28-59_____ OR THE AM OF SURGERY _6-25-24__   USE THE SOAP FROM NECK TO TOES, AVOID THEIR FACE, HAIR, AND PRIVATE PARTS. IF USE THE SOAP THE NIGHT PRIOR TO SURGERY, CHANGE BED LINENS AND NO PETS IN THE BED.     INSTRUCTED NO LOTIONS, JEWELRY, PIERCINGS,  NAIL POLISH, OR DEODORANT DAY OF SURGERY (STOP VITAMINS)    - IF DIABETIC, CHECK BLOOD GLUCOSE IF LESS THAN 70 OR HAVING SYMPTOMS CALL THE PREOP AREA FOR INSTRUCTIONS ON AM OF SURGERY ([phone]     -INSTRUCTED TO TAKE THE FOLLOWING MEDICATIONS THE DAY OF SURGERY WITH SIPS OF WATER:           WELLBUTRIN, BUSPAR, LEXAPRO, ZOFRAN IF NEEDED    INSTRUCTED PATIENT TO TAKE METFORMIN BY 6 PM THE NIGHT PRIOR TO SURGERY 6-24-24 AND DO NOT TAKE DAY OF SURGERY 6-25-24        - DO NOT BRING ANY MEDICATIONS WITH YOU TO THE HOSPITAL THE DAY OF SURGERY, EXCEPT IF USE INHALERS. BRING INHALERS DAY OF SURGERY       - BRING CPAP OR BIPAP TO THE HOSPITAL ONLY IF YOU ARE SPENDING THE NIGHT    - DO NOT SMOKE OR VAPE 24 HOURS PRIOR TO PROCEDURE PER ANESTHESIA REQUEST     -MAKE SURE YOU HAVE A RIDE HOME OR SOMEONE TO STAY WITH YOU THE DAY OF THE PROCEDURE AFTER YOU GO HOME     - FOLLOW ANY OTHER INSTRUCTIONS GIVEN TO YOU BY YOUR SURGEON'S OFFICE.     - DAY OF SURGERY __3-89-46___, COME TO ELEVATOR A, THIRD FLOOR, CHECK IN AT THE DESK FOR REGISTRATION/SURGERY     - YOU WILL RECEIVE A PHONE CALL THE DAY PRIOR TO SURGERY BETWEEN 1PM AND 4 PM WITH ARRIVAL  TIME, IF YOUR SURGERY IS ON A MONDAY YOU WILL RECEIVE A CALL THE FRIDAY PRIOR TO SURGERY DATE    - BRING CASH OR CREDIT CARD FOR COPAYMENT OF MEDICATIONS AFTER SURGERY IF YOU USE THE HOSPITAL PHARMACY (MEDS TO BED)    - PREADMISSION TESTING NURSE OSVALDO GILL -[phone] IF HAVE ANY QUESTIONS     -PATIENT PROVIDED THE NUMBER FOR PREOP SURGICAL DEPT IF HAD QUESTIONS AFTER HOURS PRIOR TO SURGERY ([phone]).  INFORMED PT IF NO ANSWER, LEAVE A MESSAGE AND SOMEONE WILL RETURN THEIR CALL       PATIENT VERBALIZED UNDERSTANDING       Clear Liquid Diet        Find out when you need to start a clear liquid diet.   Think of “clear liquids” as anything you could read a newspaper through. This includes things like water, broth, sports drinks, or tea WITHOUT any kind of milk or cream.           Once you are told to start a clear liquid diet, only drink these things until 2 hours before arrival to the hospital or when the hospital says to stop. Total volume limitation: 8 oz.       Clear liquids you CAN drink:   Water   Clear broth: beef, chicken, vegetable, or bone broth with nothing in it   Gatorade   Lemonade or Jayme-aid   Soda   Tea, coffee (NO cream or honey)   Jell-O (without fruit)   Popsicles (without fruit or cream)   Italian ices   Juice without pulp: apple, white, grape   You may use salt, pepper, and sugar  NO RED  NO NOODLES    Do NOT drink:   Milk or cream   Soy milk, almond milk, coconut milk, or other non-dairy drinks and   creamers   Milkshakes or smoothies   Tomato juice   Orange juice   Grapefruit juice   Cream soups or any other than broth         Clear Liquid Diet:  Do NOT eat any solid food.  Do NOT eat or suck on mints or candy.  Do NOT chew gum.  Do NOT drink thick liquids like milk or juice with pulp in it.  Do NOT add milk, cream, or anything like soy milk or almond milk to coffee or tea.

## 2024-06-25 ENCOUNTER — ANESTHESIA EVENT (OUTPATIENT)
Dept: PERIOP | Facility: HOSPITAL | Age: 44
End: 2024-06-25
Payer: COMMERCIAL

## 2024-06-25 ENCOUNTER — ANESTHESIA (OUTPATIENT)
Dept: PERIOP | Facility: HOSPITAL | Age: 44
End: 2024-06-25
Payer: COMMERCIAL

## 2024-06-25 ENCOUNTER — HOSPITAL ENCOUNTER (OUTPATIENT)
Facility: HOSPITAL | Age: 44
Setting detail: HOSPITAL OUTPATIENT SURGERY
Discharge: HOME | End: 2024-06-25
Attending: OBSTETRICS & GYNECOLOGY | Admitting: OBSTETRICS & GYNECOLOGY
Payer: COMMERCIAL

## 2024-06-25 VITALS
SYSTOLIC BLOOD PRESSURE: 116 MMHG | WEIGHT: 235.89 LBS | DIASTOLIC BLOOD PRESSURE: 66 MMHG | HEART RATE: 62 BPM | RESPIRATION RATE: 18 BRPM | HEIGHT: 60 IN | OXYGEN SATURATION: 98 % | BODY MASS INDEX: 46.31 KG/M2 | TEMPERATURE: 98.2 F

## 2024-06-25 DIAGNOSIS — N73.6 PELVIC ADHESIVE DISEASE: Primary | ICD-10-CM

## 2024-06-25 LAB
ANION GAP SERPL CALCULATED.3IONS-SCNC: 10 MMOL/L (ref 5–15)
BUN SERPL-MCNC: 12 MG/DL (ref 6–20)
BUN/CREAT SERPL: 17.1 (ref 7–25)
CALCIUM SPEC-SCNC: 8.7 MG/DL (ref 8.6–10.5)
CHLORIDE SERPL-SCNC: 104 MMOL/L (ref 98–107)
CO2 SERPL-SCNC: 23 MMOL/L (ref 22–29)
CREAT SERPL-MCNC: 0.7 MG/DL (ref 0.57–1)
EGFRCR SERPLBLD CKD-EPI 2021: 110.9 ML/MIN/1.73
GLUCOSE BLDC GLUCOMTR-MCNC: 167 MG/DL (ref 70–99)
GLUCOSE BLDC GLUCOMTR-MCNC: 180 MG/DL (ref 70–99)
GLUCOSE SERPL-MCNC: 106 MG/DL (ref 65–99)
POTASSIUM SERPL-SCNC: 3.8 MMOL/L (ref 3.5–5.2)
SODIUM SERPL-SCNC: 137 MMOL/L (ref 136–145)

## 2024-06-25 PROCEDURE — 25010000002 FENTANYL CITRATE (PF) 50 MCG/ML SOLUTION: Performed by: NURSE ANESTHETIST, CERTIFIED REGISTERED

## 2024-06-25 PROCEDURE — 31500 INSERT EMERGENCY AIRWAY: CPT | Performed by: ANESTHESIOLOGY

## 2024-06-25 PROCEDURE — 25010000002 HYDROMORPHONE 1 MG/ML SOLUTION: Performed by: NURSE ANESTHETIST, CERTIFIED REGISTERED

## 2024-06-25 PROCEDURE — 25010000002 GLYCOPYRROLATE 0.2 MG/ML SOLUTION: Performed by: NURSE ANESTHETIST, CERTIFIED REGISTERED

## 2024-06-25 PROCEDURE — 82948 REAGENT STRIP/BLOOD GLUCOSE: CPT

## 2024-06-25 PROCEDURE — 25010000002 MIDAZOLAM PER 1MG: Performed by: ANESTHESIOLOGY

## 2024-06-25 PROCEDURE — 25010000002 ONDANSETRON PER 1 MG: Performed by: NURSE ANESTHETIST, CERTIFIED REGISTERED

## 2024-06-25 PROCEDURE — 25010000002 DEXAMETHASONE PER 1 MG: Performed by: NURSE ANESTHETIST, CERTIFIED REGISTERED

## 2024-06-25 PROCEDURE — 25810000003 LACTATED RINGERS PER 1000 ML: Performed by: ANESTHESIOLOGY

## 2024-06-25 PROCEDURE — 49320 DIAG LAPARO SEPARATE PROC: CPT | Performed by: OBSTETRICS & GYNECOLOGY

## 2024-06-25 PROCEDURE — 82948 REAGENT STRIP/BLOOD GLUCOSE: CPT | Performed by: NURSE ANESTHETIST, CERTIFIED REGISTERED

## 2024-06-25 PROCEDURE — 49320 DIAG LAPARO SEPARATE PROC: CPT

## 2024-06-25 PROCEDURE — 25010000002 SUGAMMADEX 200 MG/2ML SOLUTION: Performed by: NURSE ANESTHETIST, CERTIFIED REGISTERED

## 2024-06-25 PROCEDURE — 80048 BASIC METABOLIC PNL TOTAL CA: CPT | Performed by: ANESTHESIOLOGY

## 2024-06-25 PROCEDURE — 25010000002 PROPOFOL 10 MG/ML EMULSION: Performed by: NURSE ANESTHETIST, CERTIFIED REGISTERED

## 2024-06-25 PROCEDURE — 25010000002 CEFAZOLIN PER 500 MG: Performed by: OBSTETRICS & GYNECOLOGY

## 2024-06-25 RX ORDER — ROCURONIUM BROMIDE 10 MG/ML
INJECTION, SOLUTION INTRAVENOUS AS NEEDED
Status: DISCONTINUED | OUTPATIENT
Start: 2024-06-25 | End: 2024-06-25 | Stop reason: SURG

## 2024-06-25 RX ORDER — OXYCODONE HYDROCHLORIDE 5 MG/1
5 TABLET ORAL EVERY 8 HOURS PRN
Qty: 6 TABLET | Refills: 0 | Status: SHIPPED | OUTPATIENT
Start: 2024-06-25

## 2024-06-25 RX ORDER — FENTANYL CITRATE 50 UG/ML
INJECTION, SOLUTION INTRAMUSCULAR; INTRAVENOUS AS NEEDED
Status: DISCONTINUED | OUTPATIENT
Start: 2024-06-25 | End: 2024-06-25 | Stop reason: SURG

## 2024-06-25 RX ORDER — IBUPROFEN 600 MG/1
600 TABLET ORAL EVERY 6 HOURS PRN
Qty: 30 TABLET | Refills: 0 | Status: SHIPPED | OUTPATIENT
Start: 2024-06-25

## 2024-06-25 RX ORDER — SODIUM CHLORIDE, SODIUM LACTATE, POTASSIUM CHLORIDE, CALCIUM CHLORIDE 600; 310; 30; 20 MG/100ML; MG/100ML; MG/100ML; MG/100ML
9 INJECTION, SOLUTION INTRAVENOUS CONTINUOUS PRN
Status: DISCONTINUED | OUTPATIENT
Start: 2024-06-25 | End: 2024-06-25 | Stop reason: HOSPADM

## 2024-06-25 RX ORDER — MEPERIDINE HYDROCHLORIDE 25 MG/ML
12.5 INJECTION INTRAMUSCULAR; INTRAVENOUS; SUBCUTANEOUS
Status: DISCONTINUED | OUTPATIENT
Start: 2024-06-25 | End: 2024-06-25 | Stop reason: HOSPADM

## 2024-06-25 RX ORDER — SCOLOPAMINE TRANSDERMAL SYSTEM 1 MG/1
1 PATCH, EXTENDED RELEASE TRANSDERMAL ONCE
Status: DISCONTINUED | OUTPATIENT
Start: 2024-06-25 | End: 2024-06-25 | Stop reason: HOSPADM

## 2024-06-25 RX ORDER — PROPOFOL 10 MG/ML
VIAL (ML) INTRAVENOUS AS NEEDED
Status: DISCONTINUED | OUTPATIENT
Start: 2024-06-25 | End: 2024-06-25 | Stop reason: SURG

## 2024-06-25 RX ORDER — IBUPROFEN 600 MG/1
600 TABLET ORAL EVERY 6 HOURS PRN
Status: DISCONTINUED | OUTPATIENT
Start: 2024-06-25 | End: 2024-06-25 | Stop reason: HOSPADM

## 2024-06-25 RX ORDER — LIDOCAINE HYDROCHLORIDE 20 MG/ML
INJECTION, SOLUTION EPIDURAL; INFILTRATION; INTRACAUDAL; PERINEURAL AS NEEDED
Status: DISCONTINUED | OUTPATIENT
Start: 2024-06-25 | End: 2024-06-25 | Stop reason: SURG

## 2024-06-25 RX ORDER — PROMETHAZINE HYDROCHLORIDE 12.5 MG/1
25 TABLET ORAL ONCE AS NEEDED
Status: DISCONTINUED | OUTPATIENT
Start: 2024-06-25 | End: 2024-06-25 | Stop reason: HOSPADM

## 2024-06-25 RX ORDER — DEXAMETHASONE SODIUM PHOSPHATE 4 MG/ML
INJECTION, SOLUTION INTRA-ARTICULAR; INTRALESIONAL; INTRAMUSCULAR; INTRAVENOUS; SOFT TISSUE AS NEEDED
Status: DISCONTINUED | OUTPATIENT
Start: 2024-06-25 | End: 2024-06-25 | Stop reason: SURG

## 2024-06-25 RX ORDER — ACETAMINOPHEN 500 MG
1000 TABLET ORAL ONCE
Status: COMPLETED | OUTPATIENT
Start: 2024-06-25 | End: 2024-06-25

## 2024-06-25 RX ORDER — ONDANSETRON 2 MG/ML
4 INJECTION INTRAMUSCULAR; INTRAVENOUS ONCE AS NEEDED
Status: DISCONTINUED | OUTPATIENT
Start: 2024-06-25 | End: 2024-06-25 | Stop reason: HOSPADM

## 2024-06-25 RX ORDER — GLYCOPYRROLATE 0.2 MG/ML
INJECTION INTRAMUSCULAR; INTRAVENOUS AS NEEDED
Status: DISCONTINUED | OUTPATIENT
Start: 2024-06-25 | End: 2024-06-25 | Stop reason: SURG

## 2024-06-25 RX ORDER — PROMETHAZINE HYDROCHLORIDE 25 MG/1
25 SUPPOSITORY RECTAL ONCE AS NEEDED
Status: DISCONTINUED | OUTPATIENT
Start: 2024-06-25 | End: 2024-06-25 | Stop reason: HOSPADM

## 2024-06-25 RX ORDER — OXYCODONE HYDROCHLORIDE 5 MG/1
5 TABLET ORAL
Status: DISCONTINUED | OUTPATIENT
Start: 2024-06-25 | End: 2024-06-25 | Stop reason: HOSPADM

## 2024-06-25 RX ORDER — ONDANSETRON 2 MG/ML
INJECTION INTRAMUSCULAR; INTRAVENOUS AS NEEDED
Status: DISCONTINUED | OUTPATIENT
Start: 2024-06-25 | End: 2024-06-25 | Stop reason: SURG

## 2024-06-25 RX ORDER — ONDANSETRON 4 MG/1
4 TABLET, ORALLY DISINTEGRATING ORAL ONCE AS NEEDED
Status: DISCONTINUED | OUTPATIENT
Start: 2024-06-25 | End: 2024-06-25 | Stop reason: HOSPADM

## 2024-06-25 RX ORDER — ACETAMINOPHEN 325 MG/1
650 TABLET ORAL EVERY 4 HOURS PRN
Qty: 30 TABLET | Refills: 1 | Status: SHIPPED | OUTPATIENT
Start: 2024-06-25 | End: 2025-06-25

## 2024-06-25 RX ORDER — MIDAZOLAM HYDROCHLORIDE 2 MG/2ML
2 INJECTION, SOLUTION INTRAMUSCULAR; INTRAVENOUS ONCE
Status: COMPLETED | OUTPATIENT
Start: 2024-06-25 | End: 2024-06-25

## 2024-06-25 RX ADMIN — OXYCODONE 5 MG: 5 TABLET ORAL at 13:23

## 2024-06-25 RX ADMIN — PROPOFOL 150 MG: 10 INJECTION, EMULSION INTRAVENOUS at 10:59

## 2024-06-25 RX ADMIN — SODIUM CHLORIDE 2000 MG: 9 INJECTION, SOLUTION INTRAVENOUS at 11:06

## 2024-06-25 RX ADMIN — HYDROMORPHONE HYDROCHLORIDE 0.5 MG: 1 INJECTION, SOLUTION INTRAMUSCULAR; INTRAVENOUS; SUBCUTANEOUS at 12:43

## 2024-06-25 RX ADMIN — SUGAMMADEX 200 MG: 100 INJECTION, SOLUTION INTRAVENOUS at 11:49

## 2024-06-25 RX ADMIN — IBUPROFEN 600 MG: 600 TABLET, FILM COATED ORAL at 13:23

## 2024-06-25 RX ADMIN — ONDANSETRON HYDROCHLORIDE 4 MG: 2 SOLUTION INTRAMUSCULAR; INTRAVENOUS at 11:34

## 2024-06-25 RX ADMIN — FENTANYL CITRATE 50 MCG: 50 INJECTION, SOLUTION INTRAMUSCULAR; INTRAVENOUS at 10:59

## 2024-06-25 RX ADMIN — ONDANSETRON 4 MG: 2 INJECTION INTRAMUSCULAR; INTRAVENOUS at 12:19

## 2024-06-25 RX ADMIN — SCOPALAMINE 1 PATCH: 1 PATCH, EXTENDED RELEASE TRANSDERMAL at 10:40

## 2024-06-25 RX ADMIN — HYDROMORPHONE HYDROCHLORIDE 0.25 MG: 1 INJECTION, SOLUTION INTRAMUSCULAR; INTRAVENOUS; SUBCUTANEOUS at 12:31

## 2024-06-25 RX ADMIN — FENTANYL CITRATE 50 MCG: 50 INJECTION, SOLUTION INTRAMUSCULAR; INTRAVENOUS at 11:34

## 2024-06-25 RX ADMIN — MIDAZOLAM HYDROCHLORIDE 2 MG: 1 INJECTION, SOLUTION INTRAMUSCULAR; INTRAVENOUS at 10:45

## 2024-06-25 RX ADMIN — ACETAMINOPHEN 1000 MG: 500 TABLET ORAL at 10:42

## 2024-06-25 RX ADMIN — LIDOCAINE HYDROCHLORIDE 80 MG: 20 INJECTION, SOLUTION INTRAVENOUS at 10:59

## 2024-06-25 RX ADMIN — GLYCOPYRROLATE 0.1 MG: 0.2 INJECTION INTRAMUSCULAR; INTRAVENOUS at 11:15

## 2024-06-25 RX ADMIN — DEXAMETHASONE SODIUM PHOSPHATE 8 MG: 4 INJECTION, SOLUTION INTRAMUSCULAR; INTRAVENOUS at 11:15

## 2024-06-25 RX ADMIN — ROCURONIUM BROMIDE 50 MG: 10 INJECTION, SOLUTION INTRAVENOUS at 11:01

## 2024-06-25 RX ADMIN — SODIUM CHLORIDE, SODIUM LACTATE, POTASSIUM CHLORIDE, AND CALCIUM CHLORIDE 9 ML/HR: .6; .31; .03; .02 INJECTION, SOLUTION INTRAVENOUS at 10:40

## 2024-06-25 NOTE — ANESTHESIA PREPROCEDURE EVALUATION
Anesthesia Evaluation     Patient summary reviewed and Nursing notes reviewed   no history of anesthetic complications:   NPO Solid Status: > 8 hours  NPO Liquid Status: > 2 hours           Airway   Mallampati: II  TM distance: >3 FB  Neck ROM: full  Dental - normal exam     Pulmonary - normal exam   Cardiovascular - normal exam  Exercise tolerance: poor (<4 METS)        Neuro/Psych  (+) headaches, psychiatric history Anxiety, Depression and ADHD  GI/Hepatic/Renal/Endo    (+) morbid obesity, renal disease- stones, diabetes mellitus    Musculoskeletal     Abdominal   (+) obese   Substance History - negative use     OB/GYN negative ob/gyn ROS         Other   arthritis,     ROS/Med Hx Other: PAT Nursing Notes unavailable.               Anesthesia Plan    ASA 3     general     intravenous induction     Anesthetic plan, risks, benefits, and alternatives have been provided, discussed and informed consent has been obtained with: patient.    Plan discussed with CRNA.    CODE STATUS:

## 2024-06-25 NOTE — ANESTHESIA POSTPROCEDURE EVALUATION
Patient: Sunitha Ly    Procedure Summary       Date: 06/25/24 Room / Location: [facility] OR 05 / [facility] MAIN OR    Anesthesia Start: 1056 Anesthesia Stop: 1211    Procedure: DIAGNOSTIC LAPAROSCOPY (Abdomen) Diagnosis:       Cyst of left ovary      (Cyst of left ovary [N83.202])    Surgeons: Iraida Anderson DO Provider: Neo Huizar MD    Anesthesia Type: general ASA Status: 3            Anesthesia Type: general    Vitals  Vitals Value Taken Time   /60 06/25/24 1258   Temp 36.3 °C (97.4 °F) 06/25/24 1250   Pulse 55 06/25/24 1258   Resp 13 06/25/24 1250   SpO2 91 % 06/25/24 1258   Vitals shown include unfiled device data.        Post Anesthesia Care and Evaluation    Patient location during evaluation: bedside  Patient participation: complete - patient participated  Level of consciousness: awake  Pain management: adequate    Airway patency: patent  PONV Status: none  Cardiovascular status: acceptable and stable  Respiratory status: acceptable  Hydration status: acceptable

## 2024-06-25 NOTE — DISCHARGE INSTRUCTIONS
DISCHARGE INSTRUCTIONS  GYNECOLOGICAL  PROCEDURES      For your surgery you had:  General anesthesia (you may have a sore throat for the first 24 hours)  You received a medicated patch for nausea prevention today (behind your ear). It is recommended that you remove it 24-48 hours post-operatively. It must be removed within 72 hours.    You may experience dizziness, drowsiness, or lightheadedness for several hours following surgery.  Do not stay alone today or tonight.  Limit your activity for 24 hours.  Resume your diet slowly.  Follow any special dietary instructions you may have been given by your doctor.  You should not drive or operate machinery, drink alcohol, or sign legally binding documents for 24 hours or while you are taking pain medication.    NOTIFY YOUR DOCTOR IF YOU EXPERIENCE ANY OF THE FOLLOWING:  Temperature greater than 101 degrees Fahrenheit  Shaking Chills  Redness or excessive drainage from incision  Nausea, vomiting and/or pain that is not controlled by prescribed medications  Increase in bleeding or bleeding that is excessive  Unable to urinate in 6 hours after surgery  If unable to reach your doctor, please go to the closest Emergency Room [] Remove dressing in:   [x] Nothing in the vagina until cleared at follow up to include intercourse, douches, or tampons.  [x] Vaginal bleeding may be expected for several days with flow decreasing with time and never any heavier than a normal period.  If you have foul smelling discharge, notify your physician.      SPECIAL INSTRUCTIONS:      Last dose of pain medication was given at:    Tylenol (1000mg) last at 10:42am. Do not exceed 4000mg of tylenol in a 24 hour period.    OXY IR 5 MG AT 1:23 PM  MOTRIN 600 MG AT 1:23 PM

## 2024-06-25 NOTE — OP NOTE
Diagnostic Laparoscopy    Patient Name:  Sunitha Ly  YOB: 1980    Date of Surgery:  6/25/2024     Indications:  Persistent Left Ovarian Cyst    Pre-op Diagnosis:   Cyst of left ovary [N83.202]       Post-Op Diagnosis Codes:     Pelvic Adhesive Disease    Procedure/CPT® Codes:      Procedure(s):  DIAGNOSTIC LAPAROSCOPY    Staff:  Surgeon(s):  Iraida Anderson DO    Assistant: Julianne Ghosh RN CSA    Anesthesia: General    Estimated Blood Loss: 2 mL    Implants:    Nothing was implanted during the procedure    Specimen:          None        Findings: Omentum and bowel densely adhered to anterior abdominal wall preventing visualization of the pelvis. The ovaries were not able to be seen    Complications: none    Description of Procedure: After the appropriate consents had been obtained, the patient was taken  to the operative suite where she was placed in supine position and  underwent general endotracheal intubation.  After an adequate level of  anesthesia been obtained, the patient was placed in the low lithotomy  position and prepped and draped in usual sterile fashion.  She has had a previous supracervical hysterectomy after CS. A sponge stick was placed in the vagina.  A Rutherford catheter was placed with drainage of  clear urine.  Attention was now turned towards the abdominal portion of  the procedure.  A supraumbilical incision was made after the skin had been  anesthetized with half percent Marcaine.  The Veress needle was introduced  into the abdominal cavity and correct place was confirmed using water  saline test.  A pneumoperitoneum was then created using carbon dioxide  gas.  After an adequate pneumoperitoneum had been created, the Veress  needle was removed.  The laparoscope and the 5 mm trocar and sleeve was  introduced into the abdominal cavity under direct visualization.  The  trocar was removed and the laparoscope was reinserted.  The patient was  placed in steep  Trendelenburg.  The above findings were noted.  Due to the significant adhesions, I was not able to visualize the pelvis and the ovaries could not be seen. The procedure was terminated.  The gas was allowed to escape from the abdomen  and the instruments were removed.  The skin was reapproximated in a  subcuticular fashion using suture of 4-0 Monocryl.  Dermabond dressing was  placed.  The Rutherford catheter was removed and the  The patient was taken out of the lithotomy position awakened,  extubated and transferred to the recovery room in stable and satisfactory  condition.  The sponge count instrument counts were verified as correct.      Assistant: Julianne Ghosh RN CSA  was responsible for performing the following activities: Closing and Delivery of Fetus and their skilled assistance was necessary for the success of this case.    Iraida Anderson DO     Date: 6/25/2024  Time: 14:06 EDT

## 2024-06-25 NOTE — ANESTHESIA PROCEDURE NOTES
Airway  Urgency: elective    Date/Time: 6/25/2024 11:06 AM  Airway not difficult    General Information and Staff    Patient location during procedure: ICU  CRNA/CAA: Moy Lanier CRNA    Indications and Patient Condition  Indications for airway management: airway protection    Preoxygenated: yes  Mask difficulty assessment: 1 - vent by mask    Final Airway Details  Final airway type: endotracheal airway      Successful airway: ETT  Cuffed: yes   Successful intubation technique: direct laryngoscopy  Facilitating devices/methods: intubating stylet  Endotracheal tube insertion site: oral  Blade: Liang  Blade size: 2  ETT size (mm): 7.0  Cormack-Lehane Classification: grade I - full view of glottis  Placement verified by: chest auscultation and capnometry   Measured from: lips  ETT/EBT  to lips (cm): 21  Number of attempts at approach: 1  Assessment: lips, teeth, and gum same as pre-op and atraumatic intubation

## 2024-06-27 DIAGNOSIS — N73.6 PELVIC ADHESIVE DISEASE: Primary | ICD-10-CM

## 2024-07-01 RX ORDER — TIRZEPATIDE 2.5 MG/.5ML
INJECTION, SOLUTION SUBCUTANEOUS
Qty: 4 ML | Refills: 0 | OUTPATIENT
Start: 2024-07-01

## 2024-07-03 ENCOUNTER — TELEPHONE (OUTPATIENT)
Dept: OBSTETRICS AND GYNECOLOGY | Facility: CLINIC | Age: 44
End: 2024-07-03
Payer: COMMERCIAL

## 2024-07-03 DIAGNOSIS — R92.8 ABNORMAL MAMMOGRAM: Primary | ICD-10-CM

## 2024-07-03 NOTE — TELEPHONE ENCOUNTER
----- Message from Iraida Anderson sent at 6/21/2024 12:01 PM EDT -----  Please place orders for diagnostic mammogram and ultrasound notify patient and schedule

## 2024-07-08 NOTE — PROGRESS NOTES
"Post Operative Visit        Chief Complaint   Patient presents with    Post-op Follow-up     HPI:   Pain:  no  Vaginal bleeding:  no  Vaginal discharge:  no  Fever/chills:  no  Good appetite:  yes  Normal bladder function:  yes  Normal bowel function:  yes  Hot flashes: no    PHYSICAL EXAM:  /77 (BP Location: Right arm, Patient Position: Sitting, Cuff Size: Adult)   Pulse 75   Resp 16   Ht 152.4 cm (60\")   Wt 108 kg (238 lb 9.6 oz)   LMP 01/14/2014 (LMP Unknown)   SpO2 96%   BMI 46.60 kg/m²   General- NAD, alert and oriented, appropriate  Psych- Normal mood, good memory    Abdomen- Soft, non distended, non tender, no masses  Incision/s-  Clean, dry, intact, healing well          CLINICAL PHOTOGRAPHS - SCAN - DIAGNOSTIC LAPAROSCOPY PHOTO, TUNDE, 06/25/2024 (06/25/2024)    Op Note by Iraida Anderson DO (06/25/2024 11:29)    ASSESSMENT and PLAN:  Post-operative exam    Diagnoses and all orders for this visit:    1. Postoperative follow-up (Primary)    Reviewed pictures and findings at the time of surgery with the patient.  She has received a call and has a follow-up appointment with Dr. Beauchamp in [place] next week.  He will make further recommendations for her surgery.  The patient was given opportunity ask questions and her questions were answered to her satisfaction.    Operative report, surgical findings and any pathology/photos reviewed.  All questions answered.     Counseling:   Ok to resume normal activities  Ok to resume intercourse  Return to school/work without limitations      Follow Up:  No follow-ups on file.            Iraida Anderson DO  07/10/2024    [facility]  [address] [place] [address] [address] [place] [address]  Dept: [phone]  Dept Fax: [phone]  Loc: [phone]  Loc Fax: [phone]   "

## 2024-07-10 ENCOUNTER — OFFICE VISIT (OUTPATIENT)
Dept: OBSTETRICS AND GYNECOLOGY | Facility: CLINIC | Age: 44
End: 2024-07-10
Payer: COMMERCIAL

## 2024-07-10 VITALS
OXYGEN SATURATION: 96 % | RESPIRATION RATE: 16 BRPM | DIASTOLIC BLOOD PRESSURE: 77 MMHG | WEIGHT: 238.6 LBS | HEIGHT: 60 IN | HEART RATE: 75 BPM | BODY MASS INDEX: 46.84 KG/M2 | SYSTOLIC BLOOD PRESSURE: 122 MMHG

## 2024-07-10 DIAGNOSIS — Z09 POSTOPERATIVE FOLLOW-UP: Primary | ICD-10-CM

## 2024-07-10 PROCEDURE — 99024 POSTOP FOLLOW-UP VISIT: CPT | Performed by: OBSTETRICS & GYNECOLOGY

## 2024-07-10 RX ORDER — ONDANSETRON 4 MG/1
TABLET, FILM COATED ORAL
COMMUNITY
Start: 2024-07-03

## 2024-07-18 ENCOUNTER — OFFICE VISIT (OUTPATIENT)
Dept: FAMILY MEDICINE CLINIC | Facility: CLINIC | Age: 44
End: 2024-07-18
Payer: COMMERCIAL

## 2024-07-18 VITALS
HEIGHT: 60 IN | OXYGEN SATURATION: 98 % | TEMPERATURE: 98 F | HEART RATE: 55 BPM | DIASTOLIC BLOOD PRESSURE: 84 MMHG | SYSTOLIC BLOOD PRESSURE: 134 MMHG | BODY MASS INDEX: 46.33 KG/M2 | WEIGHT: 236 LBS

## 2024-07-18 DIAGNOSIS — K76.0 HEPATIC STEATOSIS: ICD-10-CM

## 2024-07-18 DIAGNOSIS — D18.03 LIVER HEMANGIOMA: ICD-10-CM

## 2024-07-18 DIAGNOSIS — R92.8 ABNORMAL MAMMOGRAM OF RIGHT BREAST: ICD-10-CM

## 2024-07-18 DIAGNOSIS — F32.A DEPRESSION, UNSPECIFIED DEPRESSION TYPE: ICD-10-CM

## 2024-07-18 DIAGNOSIS — E66.01 CLASS 3 SEVERE OBESITY DUE TO EXCESS CALORIES WITH SERIOUS COMORBIDITY AND BODY MASS INDEX (BMI) OF 45.0 TO 49.9 IN ADULT: ICD-10-CM

## 2024-07-18 DIAGNOSIS — Z00.00 ANNUAL PHYSICAL EXAM: Primary | ICD-10-CM

## 2024-07-18 DIAGNOSIS — G47.33 OSA ON CPAP: ICD-10-CM

## 2024-07-18 DIAGNOSIS — R53.82 CHRONIC FATIGUE: ICD-10-CM

## 2024-07-18 DIAGNOSIS — F41.9 ANXIETY: ICD-10-CM

## 2024-07-18 PROCEDURE — 99214 OFFICE O/P EST MOD 30 MIN: CPT

## 2024-07-18 PROCEDURE — 99396 PREV VISIT EST AGE 40-64: CPT

## 2024-07-18 NOTE — PROGRESS NOTES
Chief Complaint  Chief Complaint   Patient presents with    Obesity     Follow up to see if pt has been involved in 3 month weight program with insurance. She states she is in it      Annual Exam       Subjective      Sunitha Ly presents to [facility]  History of Present Illness  The patient presents for annual physical.    The patient was previously scheduled for an oophorectomy, however, due to excessive scar tissue, the procedure was postponed. She has been advised to discontinue GLP-1 medication for a minimum of 2 weeks prior to-surgery, with the last dose taken approximately 1 to 1.5 months ago. Currently, she is on a 5 mg dose of Zepbound, with a full box remaining. She experienced a weight loss of 5 pounds within a month of starting Wegovy, but has since regained it. She has enrolled in the weight management program through her insurance and has recently undergone laboratory tests through [facility]. Her current medications include Wellbutrin, Lexapro, and BuSpar, which she reports as beneficial. She has discontinued the use of ibuprofen and oxycodone for abdominal pain, but possesses them for use as needed with upcoming surgery. She has Zofran available for use as needed for nausea.    The patient recently underwent a mammogram, which revealed a small group of calcifications in the right breast. Gynecology has ordered a diagnostic mammogram and ultrasound, which is scheduled for next Friday.    She has a new diagnosis of obstructive sleep apnea and was recently provided a CPAP machine.  She started using the CPAP machine about 1 month ago.  She still feels tired all the time.  She will follow-up with sleep medicine next month to review efficacy of current settings.    MRI of abdomen was done due to incidental finding of a liver mass.  She does have a fatty liver.  Mass was confirmed to be a hemangioma.  Objective     Medical History:  Past Medical History:   Diagnosis  Date    Abnormal Pap smear of cervix     ADHD     Anxiety and depression     Arthritis     Chlamydia     Diabetes mellitus     PRE DIABETES- TAKES MEDS- DOESN'T CHECK BG AT HOME    Endometriosis     Gonorrhea     Kidney stone     Left ovarian cyst     Migraine without aura     Ovarian cyst     Periarthritis of shoulder     Placenta accreta     Urinary tract infection      Past Surgical History:   Procedure Laterality Date     SECTION      3    CHOLECYSTECTOMY      CYSTOSCOPY BLADDER STONE LITHOTRIPSY      HYSTERECTOMY      emergency post partum, Placenta accreta, supra cervical    SALPINGO OOPHORECTOMY N/A 2024    Procedure: DIAGNOSTIC LAPAROSCOPY;  Surgeon: Iraida Anderson DO;  Location: [facility] MAIN OR;  Service: Gynecology;  Laterality: N/A;    WISDOM TOOTH EXTRACTION        Social History     Tobacco Use    Smoking status: Never    Smokeless tobacco: Never   Vaping Use    Vaping status: Never Used   Substance Use Topics    Alcohol use: Yes     Comment: I only drink socially and not much at any one time.    Drug use: Never     Family History   Problem Relation Age of Onset    Hypertension Father     Sleep apnea Father     Breast cancer Mother     Hypertension Brother     Melanoma Paternal Grandfather     Prostate cancer Maternal Grandfather     Uterine cancer Neg Hx     Ovarian cancer Neg Hx     Colon cancer Neg Hx        Medications:  Prior to Admission medications    Medication Sig Start Date End Date Taking? Authorizing Provider   acetaminophen (Tylenol) 325 MG tablet Take 2 tablets by mouth Every 4 (Four) Hours As Needed for Moderate Pain. 24 Yes Iraida Anderson DO   buPROPion XL (WELLBUTRIN XL) 150 MG 24 hr tablet TAKE 1 TABLET BY MOUTH DAILY 24  Yes Radha Sethi APRN   busPIRone (BUSPAR) 5 MG tablet TAKE ONE TABLET BY MOUTH THREE TIMES A DAY 24  Yes Radha Sethi APRN   escitalopram (LEXAPRO) 10 MG tablet TAKE 1 TABLET BY MOUTH DAILY  "4/23/24  Yes Radha Sethi APRN   Hydrocortisone, Perianal, (Anusol-HC) 2.5 % rectal cream Apply rectally 2 times daily 3/8/24 3/8/25 Yes Leanne Jimenez APRN   ibuprofen (ADVIL,MOTRIN) 600 MG tablet Take 1 tablet by mouth Every 6 (Six) Hours As Needed for Mild Pain or Moderate Pain. 6/25/24  Yes Iraida Anderson DO   Insulin Pen Needle (Pen Needles) 31G X 6 MM misc Use 1 Needle Daily. 12/7/23  Yes Radha Sethi APRN   metFORMIN ER (GLUCOPHAGE-XR) 500 MG 24 hr tablet TAKE ONE TABLET BY MOUTH DAILY WITH BREAKFAST 10/2/23  Yes Radha Sethi APRN   ondansetron (ZOFRAN) 4 MG tablet  7/3/24  Yes ProviderJoanne MD   ondansetron ODT (ZOFRAN-ODT) 4 MG disintegrating tablet Place 1 tablet on the tongue Every 8 (Eight) Hours As Needed for Nausea or Vomiting. 12/7/23  Yes Radha Sethi APRN   oxyCODONE (ROXICODONE) 5 MG immediate release tablet Take 1 tablet by mouth Every 8 (Eight) Hours As Needed for Moderate Pain or Severe Pain. 6/25/24  Yes Iraida Anderson DO   Tirzepatide-Weight Management (ZEPBOUND) 5 MG/0.5ML solution auto-injector Inject 0.5 mL under the skin into the appropriate area as directed 1 (One) Time Per Week. 7/2/24  Yes Radha Sethi APRN   vitamin B-12 (CYANOCOBALAMIN) 1000 MCG tablet Take 1 tablet by mouth Daily.   Yes ProviderJoanne MD   Vitamin D, Cholecalciferol, (CHOLECALCIFEROL) 10 MCG (400 UNIT) tablet Take 500 Units by mouth Daily.   Yes Joanne Hall MD        Allergies:   Patient has no known allergies.    Health Maintenance Due   Topic Date Due    Pneumococcal Vaccine 0-64 (2 of 2 - PPSV23 or PCV20) 03/16/2015    COVID-19 Vaccine (3 - 2023-24 season) 09/01/2023    ANNUAL PHYSICAL  03/31/2024    LIPID PANEL  03/31/2024         Vital Signs:   /84 (BP Location: Left arm, Patient Position: Sitting, Cuff Size: Adult)   Pulse 55   Temp 98 °F (36.7 °C) (Oral)   Ht 152.4 cm (60\")   Wt 107 kg (236 lb)   SpO2 98%   " BMI 46.09 kg/m²     Wt Readings from Last 3 Encounters:   07/18/24 107 kg (236 lb)   07/10/24 108 kg (238 lb 9.6 oz)   06/25/24 107 kg (235 lb 14.3 oz)     BP Readings from Last 3 Encounters:   07/18/24 134/84   07/10/24 122/77   06/25/24 116/66     Physical Exam  Vitals reviewed.   Constitutional:       Appearance: Normal appearance. She is well-developed. She is morbidly obese.   HENT:      Head: Normocephalic and atraumatic.   Eyes:      Conjunctiva/sclera: Conjunctivae normal.      Pupils: Pupils are equal, round, and reactive to light.   Cardiovascular:      Rate and Rhythm: Normal rate and regular rhythm.      Heart sounds: No murmur heard.     No friction rub. No gallop.   Pulmonary:      Effort: Pulmonary effort is normal.      Breath sounds: Normal breath sounds. No wheezing or rhonchi.   Abdominal:      General: Bowel sounds are normal. There is no distension.      Palpations: Abdomen is soft.      Tenderness: There is no abdominal tenderness.   Skin:     General: Skin is warm and dry.   Neurological:      Mental Status: She is alert and oriented to person, place, and time.      Cranial Nerves: No cranial nerve deficit.   Psychiatric:         Mood and Affect: Mood and affect normal.         Behavior: Behavior normal.         Thought Content: Thought content normal.         Judgment: Judgment normal.       Physical Exam        Result Review :    The following data was reviewed by SOY Ghosh on 07/18/24 at 11:55 EDT:        Mammo Screening Digital Tomosynthesis Bilateral With CAD    Result Date: 6/20/2024  Small group of calcifications in the right breast. Recommend further evaluation with diagnostic mammography.  TISSUE DENSITY:  There are scattered areas of fibroglandular density.  BI-RADS ASSESSMENT: BI-RADS 0. Incomplete - Need Additional Imaging Evaluation and/or Prior Mammograms for Comparison.   Note: It has been reported that there is approximately a 15% false negative in  mammography. Therefore, management of a palpable abnormality should not be deferred because of a negative mammogram.           Electronically Signed By-Tyson Rogers MD On:6/20/2024 2:40 PM      CT Abdomen Pelvis With Contrast    Result Date: 4/11/2024  Impression: 1.  Enlarging left ovarian cyst now measuring 6.5 cm x 5.7 cm. Consider follow-up pelvic ultrasound to further evaluate. 2.  Previous hysterectomy. 3.  3.7 cm x 2.5 cm mass in the left hepatic lobe with nodular peripheral enhancement. Finding is favored to represent a hemangioma. Other possible etiologies such as angiosarcoma are considered much less likely. Consider abdomen MRI given the apparent interval growth.  Electronically Signed By-Josh Jerez MD On:4/11/2024 11:08 AM        Results  Imaging  MRI of abdomen confirmed hemangioma. Mammogram showed small group of calcifications in the right breast.               Assessment and Plan    Diagnoses and all orders for this visit:    1. Annual physical exam (Primary)    2. Hepatic steatosis    3. Class 3 severe obesity due to excess calories with serious comorbidity and body mass index (BMI) of 45.0 to 49.9 in adult  -     Tirzepatide-Weight Management (ZEPBOUND) 7.5 MG/0.5ML solution auto-injector; Inject 0.5 mL under the skin into the appropriate area as directed 1 (One) Time Per Week.  Dispense: 2 mL; Refill: 0    4. Abnormal mammogram of right breast    5. ANGELICA on CPAP    6. Depression, unspecified depression type    7. Anxiety    8. Liver hemangioma    9. Chronic fatigue       Assessment & Plan  Patient is up-to-date on all age-appropriate screenings.  For fatty liver and obesity she will restart GLP-1 treatment after oophorectomy surgery.  She has an upcoming appointment with a surgeon through [facility] to discuss pelvic adhesions and schedule the surgery.  For depression and anxiety she will continue current medication regimen.  For chronic fatigue, patient to follow-up with sleep medicine next  month to review efficacy of CPAP.  She may need CPAP settings adjusted.  We also again discussed obesity and potential benefits of weight loss through the use of GLP-1 agonist therapy.          Smoking Cessation:    Sunitha Ly  reports that she has never smoked. She has never used smokeless tobacco.             Follow Up   Return in about 6 months (around 1/18/2025) for Next scheduled follow up.  Patient was given instructions and counseling regarding her condition or for health maintenance advice. Please see specific information pulled into the AVS if appropriate.     Please note that portions of this note were completed with a voice recognition program.    Patient or patient representative verbalized consent for the use of Ambient Listening during the visit with  SOY Ghosh for chart documentation. 7/18/2024  11:55 EDT

## 2024-07-26 ENCOUNTER — HOSPITAL ENCOUNTER (OUTPATIENT)
Dept: ULTRASOUND IMAGING | Facility: HOSPITAL | Age: 44
Discharge: HOME | End: 2024-07-26
Payer: COMMERCIAL

## 2024-07-26 ENCOUNTER — HOSPITAL ENCOUNTER (OUTPATIENT)
Dept: MAMMOGRAPHY | Facility: HOSPITAL | Age: 44
Discharge: HOME | End: 2024-07-26
Payer: COMMERCIAL

## 2024-07-26 DIAGNOSIS — R92.8 ABNORMAL MAMMOGRAM: ICD-10-CM

## 2024-07-26 PROCEDURE — G0279 TOMOSYNTHESIS, MAMMO: HCPCS

## 2024-07-26 PROCEDURE — 77065 DX MAMMO INCL CAD UNI: CPT

## 2024-07-29 ENCOUNTER — OFFICE VISIT (OUTPATIENT)
Dept: SLEEP MEDICINE | Facility: HOSPITAL | Age: 44
End: 2024-07-29
Payer: COMMERCIAL

## 2024-07-29 VITALS
OXYGEN SATURATION: 96 % | BODY MASS INDEX: 45.55 KG/M2 | WEIGHT: 232 LBS | HEART RATE: 70 BPM | SYSTOLIC BLOOD PRESSURE: 131 MMHG | HEIGHT: 60 IN | DIASTOLIC BLOOD PRESSURE: 70 MMHG

## 2024-07-29 DIAGNOSIS — E66.01 CLASS 3 SEVERE OBESITY WITH SERIOUS COMORBIDITY AND BODY MASS INDEX (BMI) OF 45.0 TO 49.9 IN ADULT, UNSPECIFIED OBESITY TYPE: ICD-10-CM

## 2024-07-29 DIAGNOSIS — G47.34 SLEEP RELATED HYPOXIA: ICD-10-CM

## 2024-07-29 DIAGNOSIS — F41.9 ANXIETY: ICD-10-CM

## 2024-07-29 DIAGNOSIS — G47.33 OBSTRUCTIVE SLEEP APNEA, ADULT: Primary | ICD-10-CM

## 2024-07-29 PROCEDURE — 99214 OFFICE O/P EST MOD 30 MIN: CPT | Performed by: FAMILY MEDICINE

## 2024-07-29 PROCEDURE — G0463 HOSPITAL OUTPT CLINIC VISIT: HCPCS

## 2024-07-29 RX ORDER — TIRZEPATIDE 5 MG/.5ML
INJECTION, SOLUTION SUBCUTANEOUS
Qty: 2 ML | Refills: 0 | OUTPATIENT
Start: 2024-07-29

## 2024-07-29 NOTE — PROGRESS NOTES
[address] [place] [address]  Phone: [phone]  Fax: [phone]      SLEEP CLINIC FOLLOW UP PROGRESS NOTE.    Sunitha Ly  [id number]   1980  44 y.o.  female      PCP: Radha Sethi APRN      Date of visit: 7/29/2024    Chief Complaint   Patient presents with    Sleep Apnea       Medications and allergies are reviewed by me and documented in the encounter.     SOCIAL (habits pertaining to sleep medicine)  History tobacco use:No  History of alcohol use: 1-2 per week  Caffeine use: 2 beverages/d    HPI:  This is a 44 y.o. PMHx anxiety. Here for management of obstructive sleep apnea (ROSA 19.9/hr with sleep-related hypoxia on 3/26/2024 HST; PAP titration was ordered insurance refused-started her on auto-CPAP to prevent any further delay of care by insurance provider).Patient is using positive airway pressure therapy and the symptoms of sleep apnea have improved significantly on the therapy. Normally patient goes to bed at 10:30 PM-1 AM and wakes up at 4-6 AM .  The patient wakes up 1-2 time(s) during the night and has no problem going back to sleep.  Feels refreshed after waking up.     Overall patient's Impression of their PAP therapy is: Going well   No air pressure concerns    She does states she falls asleep without PAP sometimes   Or she'll  not put it back on after 4 hours of use to meet insurance requirements   No  mask issues     Compliance data as reviewed by me with patient room today:  Date range 6/24/2024 to 7/23/2024  Overall use 100%  4 hour compliance: 100%  Average days used 5 hours 53 minutes  Device AirSense 11 AutoSet  Settings 8-20 cm H2O  EPR 3  95th percentile pressure 7.1 cm H2O  Maximum pressure is 10.9 cm H2O  95th percentile leak is 8.7  AHI 0.7  DME: [facility]  Mask used: Nasal pillpw - no issues no leak symptoms       -Anxiety   Doing well on lexapro     -Obesity-doing well on zepbound  Wt Readings from Last 3 Encounters:   07/29/24 105  "kg (232 lb)   07/18/24 107 kg (236 lb)   07/10/24 108 kg (238 lb 9.6 oz)       -Last seen in sleep clinic~5 months ago 2/26/2024 witnessed apneas and loud snoring by her  and others.  Unable spine with records of any prior sleep studies.    REVIEW OF SYSTEMS:   Is negative unless otherwise noted in HPI  [place] Sleepiness Scale :Total score: 15  - states she falls asleep without PAP sometimes     Disclaimer History: The above history is based on this sleep physician's in room encounter with the patient. Pre encounter self administered questionnaires are taken into consideration and discussed with patient for any discordance. The above documentation by this sleep physician is the most accurate clinical information determined by in room sleep physician encounter with patient.     PHYSICAL EXAMINATION:  Vitals:    07/29/24 1300   BP: 131/70   BP Location: Left arm   Patient Position: Sitting   Pulse: 70   SpO2: 96%   Weight: 105 kg (232 lb)   Height: 152.4 cm (60\")    Body mass index is 45.31 kg/m².   CONSTITUTIONAL: Well appearing, in no overt pain or respiratory distress   NOSE: No septal defect  RESP SYSTEM:  No overt respiratory distress, speaks in clear sentences without dyspnea, no accessory muscle use  CARDIOVASULAR: No edema noted  NEURO: Oriented x 3, no gross focal deficits         ASSESSMENT AND PLAN:  Obstructive sleep apnea ( G 47.33).    Sleep related hypoxia  -Specific Changes made by me today:  I. After review of compliance data, in visit clinical correlation, and through shared decision making: will not make any changes to PAP therapy settings.  II.  Counseled patient compliance from overall health perspective to reduce excessive daytime sleepiness as well.  PAP with all sleep intervals to include naps.  III.  Order placed for overnight oximetry.  She is optimized for overnight oximetry.  Counseled patient night of overnight oximetry to ensure she wears PAP device with overnight oximetry, " provided her verbal teach back for same.  Counseled patient I will not call her for normal results, we will follow-up in 3 months for clinical correlation and visit.  Counseled patient if the results are abnormal I will have sleep staff call her in for return to office visit sooner to decide next best step after clinical correlation.  IV. Counseled patient to follow-up with me in 3 months for therapy review.  The symptoms of sleep apnea have improved with the device and the treatment.  Patient's compliance with the device is excellent for treatment of sleep apnea.  I have independently reviewed the smart card down load and discussed with the patient the download data and encouarged the patient to continue to use the device.The residual AHI is acceptable. The device is benefiting the patient and the device is medically necessary.  Without proper control of sleep apnea and good compliance there is a increased risk for hypertension, diabetes mellitus and nonrestorative sleep with hypersomnia which can increase risk for motor vehicle accidents.  Untreated sleep apnea is also a risk factor for development of atrial fibrillation, pulmonary hypertension, insulin resistance and stroke. The patient is also instructed to get the supplies from the [facility] and and change them on a regular basis.  A prescription for supplies has been sent to the [facility].  I have also discussed the good sleep hygiene habits and adequate amount of sleep needed for good health.  Obesity, with BMI is Body mass index is 45.31 kg/m².. Counseled weight loss will be beneficial for reduction in severity of sleep apnea, healthy diet/exercise to achieve same, follow up with primary care physician for serial monitoring and to further guide management.  Anxiety, Compliant wit home therapies reviewed.  Counseled patient PAP therapy compliance for treatment of obstructive sleep apnea may be beneficial for this comorbid condition.  Follow-up with  treating clinician as previous for Anxiety.     Follow up in 3 months. Patient's questions were answered.        EMR Dragon/Transcription disclaimer:   Much of this encounter note is an electronic transcription/translation of spoken language to printed text. The electronic translation of spoken language may permit erroneous, or at times, nonsensical words or phrases to be inadvertently transcribed; Although I have reviewed the note for such errors, some may still exist.       NPI #: [id number]    Caitlin Stubbs, DO  Sleep Medicine  [facility]  07/29/24

## 2024-08-09 ENCOUNTER — DOCUMENTATION (OUTPATIENT)
Dept: SLEEP MEDICINE | Facility: HOSPITAL | Age: 44
End: 2024-08-09
Payer: COMMERCIAL

## 2024-08-09 NOTE — PROGRESS NOTES
Date 8/9/2024    - 7/29/2024 sleep clinic notes reviewed    Results of overnight oximetry provided to me today by sleep staff    Overnight oximetry on PAP performed August 2nd to 3rd, 2024  Test duration 6 hours 56 minutes  Oximetry minutes < 88% was 2 minutes 28 seconds  Time consecutive <88% was 45 seconds  O2 declan 80%    A&P    -No clinically significant hypoxia: No changes to previous plan  -Follow-up as previously planned for clinical correlation      NPI #: [id number]    Caitlin Stubbs DO  Sleep Medicine  [facility]  08/09/24

## 2024-08-16 ENCOUNTER — HOSPITAL ENCOUNTER (OUTPATIENT)
Dept: MAMMOGRAPHY | Facility: HOSPITAL | Age: 44
Discharge: HOME | End: 2024-08-16
Payer: COMMERCIAL

## 2024-08-16 ENCOUNTER — PATIENT OUTREACH (OUTPATIENT)
Dept: ONCOLOGY | Facility: HOSPITAL | Age: 44
End: 2024-08-16
Payer: COMMERCIAL

## 2024-08-16 DIAGNOSIS — R92.1 BREAST CALCIFICATIONS: ICD-10-CM

## 2024-08-16 PROCEDURE — 76098 X-RAY EXAM SURGICAL SPECIMEN: CPT

## 2024-08-16 PROCEDURE — 25010000002 LIDOCAINE 1 % SOLUTION

## 2024-08-16 PROCEDURE — C1819 TISSUE LOCALIZATION-EXCISION: HCPCS

## 2024-08-16 PROCEDURE — A4648 IMPLANTABLE TISSUE MARKER: HCPCS

## 2024-08-16 RX ORDER — LIDOCAINE HYDROCHLORIDE AND EPINEPHRINE 10; 10 MG/ML; UG/ML
INJECTION, SOLUTION INFILTRATION; PERINEURAL
Status: COMPLETED
Start: 2024-08-16 | End: 2024-08-16

## 2024-08-16 RX ORDER — LIDOCAINE HYDROCHLORIDE 10 MG/ML
INJECTION, SOLUTION INFILTRATION; PERINEURAL
Status: COMPLETED
Start: 2024-08-16 | End: 2024-08-16

## 2024-08-16 RX ORDER — DIAPER,BRIEF,INFANT-TODD,DISP
EACH MISCELLANEOUS
Status: COMPLETED
Start: 2024-08-16 | End: 2024-08-16

## 2024-08-16 RX ADMIN — LIDOCAINE HYDROCHLORIDE,EPINEPHRINE BITARTRATE: 10; .01 INJECTION, SOLUTION INFILTRATION; PERINEURAL at 14:14

## 2024-08-16 RX ADMIN — BACITRACIN: 500 OINTMENT TOPICAL at 14:14

## 2024-08-16 RX ADMIN — LIDOCAINE HYDROCHLORIDE: 10 INJECTION, SOLUTION INFILTRATION; PERINEURAL at 14:14

## 2024-08-20 LAB
CYTO UR: NORMAL
LAB AP CASE REPORT: NORMAL
LAB AP CLINICAL INFORMATION: NORMAL
LAB AP DIAGNOSIS COMMENT: NORMAL
PATH REPORT.FINAL DX SPEC: NORMAL
PATH REPORT.GROSS SPEC: NORMAL

## 2024-09-03 RX ORDER — METFORMIN HCL 500 MG
500 TABLET, EXTENDED RELEASE 24 HR ORAL
Qty: 30 TABLET | Refills: 5 | Status: SHIPPED | OUTPATIENT
Start: 2024-09-03

## 2024-09-16 DIAGNOSIS — E66.01 CLASS 3 SEVERE OBESITY DUE TO EXCESS CALORIES WITH SERIOUS COMORBIDITY AND BODY MASS INDEX (BMI) OF 45.0 TO 49.9 IN ADULT: ICD-10-CM

## 2024-09-20 ENCOUNTER — OFFICE VISIT (OUTPATIENT)
Dept: ORTHOPEDIC SURGERY | Facility: CLINIC | Age: 44
End: 2024-09-20
Payer: COMMERCIAL

## 2024-09-20 VITALS
HEIGHT: 60 IN | SYSTOLIC BLOOD PRESSURE: 140 MMHG | HEART RATE: 73 BPM | WEIGHT: 222 LBS | OXYGEN SATURATION: 96 % | BODY MASS INDEX: 43.59 KG/M2 | DIASTOLIC BLOOD PRESSURE: 75 MMHG

## 2024-09-20 DIAGNOSIS — M25.511 CHRONIC PAIN OF BOTH SHOULDERS: Primary | ICD-10-CM

## 2024-09-20 DIAGNOSIS — G89.29 CHRONIC PAIN OF BOTH SHOULDERS: Primary | ICD-10-CM

## 2024-09-20 DIAGNOSIS — M25.512 CHRONIC PAIN OF BOTH SHOULDERS: Primary | ICD-10-CM

## 2024-09-20 RX ORDER — LIDOCAINE HYDROCHLORIDE 10 MG/ML
5 INJECTION, SOLUTION INFILTRATION; PERINEURAL
Status: COMPLETED | OUTPATIENT
Start: 2024-09-20 | End: 2024-09-20

## 2024-09-20 RX ORDER — TRIAMCINOLONE ACETONIDE 40 MG/ML
40 INJECTION, SUSPENSION INTRA-ARTICULAR; INTRAMUSCULAR
Status: COMPLETED | OUTPATIENT
Start: 2024-09-20 | End: 2024-09-20

## 2024-09-20 RX ADMIN — LIDOCAINE HYDROCHLORIDE 5 ML: 10 INJECTION, SOLUTION INFILTRATION; PERINEURAL at 16:03

## 2024-09-20 RX ADMIN — TRIAMCINOLONE ACETONIDE 40 MG: 40 INJECTION, SUSPENSION INTRA-ARTICULAR; INTRAMUSCULAR at 16:03

## 2024-09-30 DIAGNOSIS — F33.0 MILD EPISODE OF RECURRENT MAJOR DEPRESSIVE DISORDER: ICD-10-CM

## 2024-09-30 DIAGNOSIS — F41.1 GENERALIZED ANXIETY DISORDER: ICD-10-CM

## 2024-09-30 RX ORDER — BUPROPION HYDROCHLORIDE 150 MG/1
150 TABLET ORAL DAILY
Qty: 30 TABLET | Refills: 2 | Status: SHIPPED | OUTPATIENT
Start: 2024-09-30

## 2024-10-13 DIAGNOSIS — E66.01 CLASS 3 SEVERE OBESITY DUE TO EXCESS CALORIES WITH SERIOUS COMORBIDITY AND BODY MASS INDEX (BMI) OF 45.0 TO 49.9 IN ADULT: ICD-10-CM

## 2024-10-13 DIAGNOSIS — E66.813 CLASS 3 SEVERE OBESITY DUE TO EXCESS CALORIES WITH SERIOUS COMORBIDITY AND BODY MASS INDEX (BMI) OF 45.0 TO 49.9 IN ADULT: ICD-10-CM

## 2024-10-14 RX ORDER — TIRZEPATIDE 7.5 MG/.5ML
INJECTION, SOLUTION SUBCUTANEOUS
Qty: 2 ML | Refills: 0 | Status: SHIPPED | OUTPATIENT
Start: 2024-10-14

## 2024-10-30 DIAGNOSIS — F32.A DEPRESSION, UNSPECIFIED DEPRESSION TYPE: ICD-10-CM

## 2024-10-30 DIAGNOSIS — F41.9 ANXIETY: ICD-10-CM

## 2024-10-30 RX ORDER — BUSPIRONE HYDROCHLORIDE 5 MG/1
5 TABLET ORAL 3 TIMES DAILY
Qty: 90 TABLET | Refills: 2 | Status: SHIPPED | OUTPATIENT
Start: 2024-10-30

## 2024-10-30 RX ORDER — ESCITALOPRAM OXALATE 10 MG/1
10 TABLET ORAL DAILY
Qty: 30 TABLET | Refills: 2 | Status: SHIPPED | OUTPATIENT
Start: 2024-10-30

## 2024-11-01 ENCOUNTER — OFFICE VISIT (OUTPATIENT)
Dept: SLEEP MEDICINE | Facility: HOSPITAL | Age: 44
End: 2024-11-01
Payer: COMMERCIAL

## 2024-11-01 VITALS
BODY MASS INDEX: 41.64 KG/M2 | OXYGEN SATURATION: 98 % | SYSTOLIC BLOOD PRESSURE: 123 MMHG | WEIGHT: 212.1 LBS | HEART RATE: 73 BPM | HEIGHT: 60 IN | DIASTOLIC BLOOD PRESSURE: 78 MMHG

## 2024-11-01 DIAGNOSIS — G47.33 OBSTRUCTIVE SLEEP APNEA, ADULT: Primary | ICD-10-CM

## 2024-11-01 DIAGNOSIS — E66.01 CLASS 3 SEVERE OBESITY WITH SERIOUS COMORBIDITY AND BODY MASS INDEX (BMI) OF 40.0 TO 44.9 IN ADULT, UNSPECIFIED OBESITY TYPE: ICD-10-CM

## 2024-11-01 DIAGNOSIS — Z72.820 SLEEP DEFICIENT: ICD-10-CM

## 2024-11-01 DIAGNOSIS — E66.813 CLASS 3 SEVERE OBESITY WITH SERIOUS COMORBIDITY AND BODY MASS INDEX (BMI) OF 40.0 TO 44.9 IN ADULT, UNSPECIFIED OBESITY TYPE: ICD-10-CM

## 2024-11-01 PROCEDURE — G0463 HOSPITAL OUTPT CLINIC VISIT: HCPCS | Performed by: FAMILY MEDICINE

## 2024-11-01 RX ORDER — ONDANSETRON 4 MG/1
TABLET, FILM COATED ORAL
COMMUNITY
Start: 2024-07-03

## 2024-11-01 NOTE — PROGRESS NOTES
[address] [place] [id number]  Phone: [phone]  Fax: [phone]      SLEEP CLINIC FOLLOW UP PROGRESS NOTE.    Sunitha Ly  [id number]   1980  44 y.o.  female      PCP: Radha Sethi APRN      Date of visit: 11/1/2024    Chief Complaint   Patient presents with    Sleep Apnea       Medications and allergies are reviewed by me and documented in the encounter.     SOCIAL (habits pertaining to sleep medicine)  History tobacco use:No  History of alcohol use: ~1 or less per week  Caffeine use: 1-2 beverages/d    HPI:  This is a 44 y.o. PMHx Anxiety/Depression. PMHx Anxiety, Depression, Morbid Obesity.  Here for management of obstructive sleep apnea (ROSA 19.9/hr with sleep-related hypoxia on 3/26/2024 HST; s/p overnight oximetry on PAP 8/2/2024 with oxygenation adequate on PAP). Patient is using positive airway pressure therapy and the symptoms of sleep apnea have improved significantly on the therapy. Normally patient goes to bed at 1030 PM to midnight and wakes up at 6 AM .  The patient wakes up 1 time(s) during the night and has no problem going back to sleep.  Feels refreshed after waking up.     Overall patient's Impression of their PAP therapy is: Not well  Having apneic events with nasal pillow   Sleep Deficient right now because taking care of her  who recently had knee surgery  No near miss or MVC 2/2 sleepiness    Slept well when she did overnight oximetry used PAP all night no issues       Compliance data as reviewed by me with patient room today:  Date range 9/22/2024 - 10/21/2024  Overall use 93%  4-hour jeremy 73%  Average days used 5 hours 17 minutes  Device AirSense 11 AutoSet  Settings 8-20 cm H2O EPR 3 full-time  95th percentile pressure is 9.6 cm H2O  Maximum pressure is 10.2 cm H2O  95th percentile leak is 18.8 LPM  AHI 0.6  DME: [facility]  Mask used: Nasal pillow - apneic events with nasal  Prior Mask:  Wisp - difficult seal  "    -Anxiety/depression  States she is doing well today  Compliant with BuSpar and Lexapro      -Last seen in sleep clinic~3 months ago on 7/29/2024 AHI 0.7, DME [facility], mask nasal pillow average use 5 hours 53 minutes otherwise compliance 100%.  Overnight oximetry on PAP ordered.  -8/9/2024 documentation by me no hypoxia on PAP on overnight oximetry      REVIEW OF SYSTEMS:   Is negative unless otherwise noted in HPI  [place] Sleepiness Scale :Total score: 15 - no near miss or MVC 2/2 sleepiness  waking her up at night to help him as he recently had knee surgery some nights up until 3 a.m    Disclaimer History: The above history is based on this sleep physician's in room encounter with the patient. Pre encounter self administered questionnaires are taken into consideration and discussed with patient for any discordance. The above documentation by this sleep physician is the most accurate clinical information determined by in room sleep physician encounter with patient.     PHYSICAL EXAMINATION:  Vitals:    11/01/24 0900   BP: 123/78   Pulse: 73   SpO2: 98%   Weight: 96.2 kg (212 lb 1.6 oz)   Height: 152.4 cm (60\")    Body mass index is 41.42 kg/m².   CONSTITUTIONAL: Well appearing, in no overt pain or respiratory distress   NOSE: No septal defect  RESP SYSTEM:  No overt respiratory distress, speaks in clear sentences without dyspnea, no accessory muscle use  CARDIOVASULAR: No edema noted  NEURO: Oriented x 3, no gross focal deficits   Psychiatric: Affect appropriate goal oriented motivated towards self care as well    Compliance data as reviewed by me with patient room today:  Date range 9/22/2024 - 10/21/2024  Overall use 93%  4-hour jeremy 73%  Average days used 5 hours 17 minutes  Device AirSense 11 AutoSet  Settings 8-20 cm H2O EPR 3 full-time  95th percentile pressure is 9.6 cm H2O  Maximum pressure is 10.2 cm H2O  95th percentile leak is 18.8 LPM  AHI 0.6  DME: [facility]  Mask used: Nasal " pillow - apneic events with nasal  Prior Mask:  Wisp - difficult seal     ASSESSMENT AND PLAN:  Obstructive sleep apnea ( G 47.33).    Sleep Deficient [Z72.820].   Counseled patient ASM recommendation for adults should sleep 7 or more hours per night on a regular basis to promote optimal health, productivity and daytime alertness.  Healthy sleep reduces the risk of drowsy driving, workplace accidents, mental health problems such as depression, obesity and medical conditions such as heart disease and type 2 diabetes.  Counseled patient to prioritize sleep, reviewed with patient appropriate sleep hygiene/stimulus control counseling.  -Specific Changes made by me today:  I. After review of compliance data, in visit clinical correlation, and through shared decision making: will not make any changes to PAP therapy settings.  II. mask fitting as soon as possible with an AirFit F20 Ful Face mask  III.  Counseled patient to follow-up with me in 4 months for therapy review.  Counseled may request sooner follow-up to sleep clinic anytime the patient feels necessary.  The symptoms of sleep apnea have improved with the device and the treatment.  Patient's compliance with the device is excellent for treatment of sleep apnea.  I have independently reviewed the smart card down load and discussed with the patient the download data and encouarged the patient to continue to use the device.The residual AHI is acceptable. The device is benefiting the patient and the device is medically necessary.  Without proper control of sleep apnea and good compliance there is a increased risk for hypertension, diabetes mellitus and nonrestorative sleep with hypersomnia which can increase risk for motor vehicle accidents.  Untreated sleep apnea is also a risk factor for development of atrial fibrillation, pulmonary hypertension, insulin resistance and stroke. The patient is also instructed to get the supplies from the [facility] and and change them  on a regular basis.  A prescription for supplies has been sent to the [facility].  I have also discussed the good sleep hygiene habits and adequate amount of sleep needed for good health.  Obesity, with BMI is Body mass index is 41.42 kg/m².. Counseled weight loss will be beneficial for reduction in severity of sleep apnea, healthy diet/exercise to achieve same, follow up with primary care physician for serial monitoring and to further guide management.  Anxiety/Depression, doing well today has a lot going on but motivated towards self care. Doing well with home therapies reviewed. Counseled patient PAP therapy compliance for treatment of sleep apnea may benefit comorbid mood disorder. Follow up with treating clinician for mood disorders as previous.    Follow up in 4 months  . Patient's questions were answered.        EMR Dragon/Transcription disclaimer:   Much of this encounter note is an electronic transcription/translation of spoken language to printed text. The electronic translation of spoken language may permit erroneous, or at times, nonsensical words or phrases to be inadvertently transcribed; Although I have reviewed the note for such errors, some may still exist.       NPI #: [id number]    Caitlin Stubbs, DO  Sleep Medicine  Psychiatric  11/01/24

## 2024-11-08 DIAGNOSIS — E66.813 CLASS 3 SEVERE OBESITY DUE TO EXCESS CALORIES WITH SERIOUS COMORBIDITY AND BODY MASS INDEX (BMI) OF 45.0 TO 49.9 IN ADULT: ICD-10-CM

## 2024-11-08 DIAGNOSIS — E66.01 CLASS 3 SEVERE OBESITY DUE TO EXCESS CALORIES WITH SERIOUS COMORBIDITY AND BODY MASS INDEX (BMI) OF 45.0 TO 49.9 IN ADULT: ICD-10-CM

## 2024-11-08 RX ORDER — TIRZEPATIDE 7.5 MG/.5ML
INJECTION, SOLUTION SUBCUTANEOUS
Qty: 2 ML | Refills: 0 | Status: SHIPPED | OUTPATIENT
Start: 2024-11-08

## 2024-12-14 DIAGNOSIS — E66.813 CLASS 3 SEVERE OBESITY DUE TO EXCESS CALORIES WITH SERIOUS COMORBIDITY AND BODY MASS INDEX (BMI) OF 45.0 TO 49.9 IN ADULT: ICD-10-CM

## 2024-12-14 DIAGNOSIS — E66.01 CLASS 3 SEVERE OBESITY DUE TO EXCESS CALORIES WITH SERIOUS COMORBIDITY AND BODY MASS INDEX (BMI) OF 45.0 TO 49.9 IN ADULT: ICD-10-CM

## 2024-12-16 RX ORDER — TIRZEPATIDE 7.5 MG/.5ML
INJECTION, SOLUTION SUBCUTANEOUS
Qty: 2 ML | Refills: 0 | Status: SHIPPED | OUTPATIENT
Start: 2024-12-16

## 2024-12-20 NOTE — PROGRESS NOTES
"Chief Complaint  Follow-up of the Left Shoulder and Follow-up of the Right Shoulder     Subjective      Sunitha Ly is a 44 y.o. female who presents to [facility] for follow-up of bilateral shoulders.  We have been seeing her for a little over a year now and she has been doing intermittent steroid injections for bilateral shoulder pain but she has also been to PT in the past.  Right and left shoulder MRIs obtained January 2024 showed rotator cuff tendinopathy, small minor tears of RC, mild glenohumeral osteoarthritis with suspected tears of labrum - see MRI reports for full detail.  Historically she has gotten good relief with the steroid injections and at her last visit on 9/20/2024 reported a 20 pound weight loss, which has also decreased her shoulder pain/increased ROM.    Today she presents and states that her bilateral shoulders actually have not been bothering her too bad just yet.  She almost canceled the appointment, however 3 days ago she slept funny and has had pain from the base of the right side of her neck down her spine and then across to the shoulder along the trapezius muscle.  She has been utilizing ice and ibuprofen without help.  She is prediabetic and does not check her glucose daily.    Of note, she has noticed some hypopigmentation and mild \"denting\" over the steroid injection sites since the last injection that she wanted to inquire about.   No Known Allergies     Social History     Socioeconomic History    Marital status:    Tobacco Use    Smoking status: Never    Smokeless tobacco: Never   Vaping Use    Vaping status: Never Used   Substance and Sexual Activity    Alcohol use: Yes     Comment: I only drink socially and not much at any one time.    Drug use: Never    Sexual activity: Defer     Partners: Male     Birth control/protection: None, Birth control pill, Hysterectomy     Comment: Supracervical Hyst        Tobacco Use: Low Risk  (12/23/2024) " "   Patient History     Smoking Tobacco Use: Never     Smokeless Tobacco Use: Never     Passive Exposure: Not on file     Patient reports that they are a nonsmoker; cessation education not applicable.     I reviewed the patient's chief complaint, history of present illness, review of systems, past medical history, surgical history, family history, social history, medications, and allergy list.     Review of Systems     Constitutional: Denies fevers, chills, weight loss  Cardiovascular: Denies chest pain, shortness of breath  Skin: Denies rashes, acute skin changes  Neurologic: Denies headache, loss of consciousness    Vital Signs:   /79   Pulse 67   Ht 152.4 cm (60\")   Wt 96.2 kg (212 lb)   SpO2 96%   BMI 41.40 kg/m²          Physical Exam  General: Alert. No acute distress    Ortho Exam      General: Alert. No acute distress.  Bilateral Upper Extremities: Point tenderness with palpable knot at base of neck with subjective discomfort all along trapezius muscle. Pain is worsened with looking to the right and left, but she does have full ROM of neck. Mild hypopigmentation and skin atrophy located over typical shoulder injection site. 130 degrees active elevation.  Demonstrates intact active elbow ROM. Demonstrates intact active wrist ROM. Sensation intact. Palpable radial pulse. Neurovascularly intact.      Imaging Results (Most Recent)       None             Result Review :       No results found.           Assessment and Plan     Diagnoses and all orders for this visit:    1. Trapezius muscle strain, right, initial encounter (Primary)  -     Ambulatory Referral to Physical Therapy for Evaluation & Treatment    Other orders  -     methylPREDNISolone (MEDROL) 4 MG dose pack; Use as directed by package instructions  Dispense: 21 tablet; Refill: 0          Follow-up in 1-2 weeks to eval efficiency of steroid pack.   She can call for PT referral whenever she is ready.     In regards to her shoulders, we decided " to hold off on injections for today. Educated they are meant to decrease pain/inflammation rather than to be used prophylactically to prevent pain. Regarding the atrophy and hypopigmentation advised this is not necessarily a contraindication to future injections, but know it may worsen with continued injections. Would recommend spacing them out as much as she can. If she ever stopped getting injections, it may or may not fade over time but could be chronic.    Call with questions, concerns or worsening symptoms.     Follow Up   There are no Patient Instructions on file for this visit.        Patient was given instructions and counseling regarding her condition or for health maintenance advice. Please see specific information pulled into the AVS if appropriate.     Brittney Sexton PA-C   12/23/2024  11:26 EST    Dictated Utilizing Dragon Dictation. Please note that portions of this note were completed with a voice recognition program. Part of this note may be an electronic transcription/translation of spoken language to printed text using the Dragon Dictation System.

## 2024-12-23 ENCOUNTER — OFFICE VISIT (OUTPATIENT)
Dept: ORTHOPEDIC SURGERY | Facility: CLINIC | Age: 44
End: 2024-12-23
Payer: COMMERCIAL

## 2024-12-23 VITALS
WEIGHT: 212 LBS | BODY MASS INDEX: 41.62 KG/M2 | HEIGHT: 60 IN | HEART RATE: 67 BPM | DIASTOLIC BLOOD PRESSURE: 79 MMHG | OXYGEN SATURATION: 96 % | SYSTOLIC BLOOD PRESSURE: 130 MMHG

## 2024-12-23 DIAGNOSIS — S46.811A TRAPEZIUS MUSCLE STRAIN, RIGHT, INITIAL ENCOUNTER: Primary | ICD-10-CM

## 2024-12-23 RX ORDER — METHYLPREDNISOLONE 4 MG/1
TABLET ORAL
Qty: 21 TABLET | Refills: 0 | Status: SHIPPED | OUTPATIENT
Start: 2024-12-23

## 2025-01-02 ENCOUNTER — PATIENT MESSAGE (OUTPATIENT)
Dept: ORTHOPEDIC SURGERY | Facility: CLINIC | Age: 45
End: 2025-01-02
Payer: COMMERCIAL

## 2025-01-02 DIAGNOSIS — S46.819A STRAIN OF TRAPEZIUS MUSCLE, UNSPECIFIED LATERALITY, INITIAL ENCOUNTER: Primary | ICD-10-CM

## 2025-01-10 ENCOUNTER — PRIOR AUTHORIZATION (OUTPATIENT)
Dept: FAMILY MEDICINE CLINIC | Facility: CLINIC | Age: 45
End: 2025-01-10
Payer: COMMERCIAL

## 2025-01-10 NOTE — TELEPHONE ENCOUNTER
Zepbound 7.5MG/0.5ML pen-injectors PA APPROVAL     PA Case ID #: [id number]  Need Help? Call us at [phone]  Outcome  Approved today by [facility] 2017  Your PA request has been approved. Additional information will be provided in the approval communication. (Message 1142)  Authorization Expiration Date: 1/10/2026

## 2025-01-13 DIAGNOSIS — E66.813 CLASS 3 SEVERE OBESITY DUE TO EXCESS CALORIES WITH SERIOUS COMORBIDITY AND BODY MASS INDEX (BMI) OF 45.0 TO 49.9 IN ADULT: ICD-10-CM

## 2025-01-13 DIAGNOSIS — F33.0 MILD EPISODE OF RECURRENT MAJOR DEPRESSIVE DISORDER: ICD-10-CM

## 2025-01-13 DIAGNOSIS — E66.01 CLASS 3 SEVERE OBESITY DUE TO EXCESS CALORIES WITH SERIOUS COMORBIDITY AND BODY MASS INDEX (BMI) OF 45.0 TO 49.9 IN ADULT: ICD-10-CM

## 2025-01-13 DIAGNOSIS — F41.1 GENERALIZED ANXIETY DISORDER: ICD-10-CM

## 2025-01-14 RX ORDER — BUPROPION HYDROCHLORIDE 150 MG/1
150 TABLET ORAL DAILY
Qty: 30 TABLET | Refills: 2 | Status: SHIPPED | OUTPATIENT
Start: 2025-01-14

## 2025-01-14 RX ORDER — TIRZEPATIDE 7.5 MG/.5ML
INJECTION, SOLUTION SUBCUTANEOUS
Qty: 2 ML | Refills: 0 | Status: SHIPPED | OUTPATIENT
Start: 2025-01-14

## 2025-01-14 NOTE — PROGRESS NOTES
Chief Complaint   Patient presents with    Annual Exam       HPI:   44 y.o. . Presents for well woman exam. s/p HYST, still has one or both ovaries, benign indications  Menses:   none  Pain:  None  Last pap: normal IGP,rfx Aptima HPV All Pth (2021 16:19)  Complaints: hx rectal hemorrhoids, experiencing more constipation and straining with bowel movements since using zepbound weightloss medication. Experiencing intermittent rectal itching secondary to the hemorrhoids and desires refill hemorrhoid cream    Desires testing for menopause, went through a period of hot flashes 3-5 years ago, now cold natured, s/p hysterectomy, ovaries remain. Has had some weightloss- 40 pounds- intentional, desires confirmation of menopause status    Past Medical History:   Diagnosis Date    Abnormal Pap smear of cervix     ADHD     Anxiety and depression     Arthritis     Chlamydia     Diabetes mellitus     PRE DIABETES- TAKES MEDS- DOESN'T CHECK BG AT HOME    Endometriosis     Gonorrhea     Kidney stone     Left ovarian cyst     Migraine without aura     Ovarian cyst     Periarthritis of shoulder     Placenta accreta     Urinary tract infection       Past Surgical History:   Procedure Laterality Date     SECTION      3    CHOLECYSTECTOMY      CYSTOSCOPY BLADDER STONE LITHOTRIPSY      HYSTERECTOMY      emergency post partum, Placenta accreta, supra cervical    SALPINGO OOPHORECTOMY N/A 2024    Procedure: DIAGNOSTIC LAPAROSCOPY;  Surgeon: Iraida Anderson DO;  Location: [facility] MAIN OR;  Service: Gynecology;  Laterality: N/A;    WISDOM TOOTH EXTRACTION        Family History   Problem Relation Age of Onset    Hypertension Father     Sleep apnea Father     Arthritis Father     Breast cancer Mother 52    Arthritis Mother     Hypertension Brother     Melanoma Paternal Grandfather     Prostate cancer Maternal Grandfather     Uterine cancer Neg Hx     Ovarian cancer Neg Hx     Colon cancer Neg Hx       Allergies as of 01/24/2025    (No Known Allergies)        PCP: does manage PMHx and preventative labs    /84   Pulse 72   Wt 94.8 kg (209 lb)   LMP 01/14/2014 (LMP Unknown)   BMI 40.82 kg/m²     PHYSICAL EXAM: Chaperone present   General- NAD, alert and oriented, appropriate  Psych- Normal mood, good memory  Neck- No masses, no thyroid enlargement  Lymphatic- No palpable neck, axillary, or groin nodes  CV- Regular rhythm, no murmurs  Resp- CTA to bases, no wheezes  Abdomen- Soft, non distended, non tender, no masses  Breast left-  Bilaterally symmetrical, no masses, non tender, no nipple discharge  Breast right- Bilaterally symmetrical, no masses, non tender, no nipple discharge  External genitalia- Normal female, no lesions  Urethra/meatus- Normal, no masses, non tender, no prolapse  Bladder- Normal, no masses, non tender, no prolapse  Vagina- Normal, no atrophy, no lesions, no discharge, no prolapse  Cvx- Normal, no lesions, no discharge, No cervical motion tenderness  Uterus- Absent  Adnexa- No mass, non tender  Anus/Rectum/Perineum-  normal appearance  Ext- No edema, no cyanosis    Skin- No lesions, no rashes, no acanthosis nigricans    ASSESSMENT and PLAN:    Diagnoses and all orders for this visit:    1. Well woman exam (Primary)  -     IgP, Aptima HPV    2. Encounter for screening mammogram for malignant neoplasm of breast  -     Mammo Screening Digital Tomosynthesis Bilateral With CAD; Future    3. Perimenopausal symptoms  -     Estradiol  -     Follicle Stimulating Hormone    4. Rectal itching  Comments:  Follow up with your PCP for evaluation and managemen of constipation related to zepbound. Seek immediate evaluation if experience rectal bleeding or new lesion  Orders:  -     Hydrocortisone, Perianal, (Anusol-HC) 2.5 % rectal cream; Insert  into the rectum 2 (Two) Times a Day As Needed for Hemorrhoids for up to 14 days. Apply rectally 2 times daily  Dispense: 28 g; Refill:  1      Preventative:   BREAST HEALTH- Monthly self breast exam importance and how to reviewed. MMG and/or MRI (prn) reviewed per society guidelines and her individual history. Screening Imaging: Updated today  CERVICAL CANCER Screening- Reviewed current ASCCP guidelines for screening w and wo cotest HPV, age specific.  Screen: Updated today  COLON CANCER Screening- Reviewed current medical society guidelines and options.  Screen:  Not medically needed  SEXUAL HEALTH: Declines STD screening  BONE HEALTH- Reviewed current medical society guidelines and options for testing, calcium and vit D intake.  Weight bearing exercise.  DEXA: Not medically needed  VACCINATIONS Recommended: COVID and booster PRN, Flu annually  Importance discussed, risk being unvaccinated reviewed.  Questions answered  Genetic testing: Does not qualify.  Smoking status- NON SMOKER     She understands the importance of having any ordered tests to be performed in a timely fashion.  The risks of not performing them include, but are not limited to, advanced cancer stages, bone loss from osteoporosis and/or subsequent increase in morbidity and/or mortality.  She is encouraged to review her results online and/or contact or office if she has questions.       Follow Up:  Return in about 1 year (around 1/24/2026), or if symptoms worsen or fail to improve.        Leanne Jimenez, APRN  01/24/2025

## 2025-01-21 ENCOUNTER — OFFICE VISIT (OUTPATIENT)
Dept: FAMILY MEDICINE CLINIC | Facility: CLINIC | Age: 45
End: 2025-01-21
Payer: COMMERCIAL

## 2025-01-21 VITALS
BODY MASS INDEX: 41.7 KG/M2 | HEART RATE: 83 BPM | SYSTOLIC BLOOD PRESSURE: 122 MMHG | DIASTOLIC BLOOD PRESSURE: 78 MMHG | HEIGHT: 60 IN | WEIGHT: 212.4 LBS | TEMPERATURE: 98 F | OXYGEN SATURATION: 99 %

## 2025-01-21 DIAGNOSIS — J01.40 ACUTE NON-RECURRENT PANSINUSITIS: ICD-10-CM

## 2025-01-21 DIAGNOSIS — E66.813 CLASS 3 SEVERE OBESITY DUE TO EXCESS CALORIES WITH SERIOUS COMORBIDITY AND BODY MASS INDEX (BMI) OF 45.0 TO 49.9 IN ADULT: Primary | ICD-10-CM

## 2025-01-21 DIAGNOSIS — R19.7 DIARRHEA, UNSPECIFIED TYPE: ICD-10-CM

## 2025-01-21 DIAGNOSIS — K76.0 HEPATIC STEATOSIS: ICD-10-CM

## 2025-01-21 DIAGNOSIS — E66.01 CLASS 3 SEVERE OBESITY DUE TO EXCESS CALORIES WITH SERIOUS COMORBIDITY AND BODY MASS INDEX (BMI) OF 45.0 TO 49.9 IN ADULT: Primary | ICD-10-CM

## 2025-01-21 DIAGNOSIS — F41.1 GENERALIZED ANXIETY DISORDER: ICD-10-CM

## 2025-01-21 DIAGNOSIS — R73.03 PREDIABETES: ICD-10-CM

## 2025-01-21 DIAGNOSIS — F33.0 MILD EPISODE OF RECURRENT MAJOR DEPRESSIVE DISORDER: ICD-10-CM

## 2025-01-21 DIAGNOSIS — G47.33 OSA ON CPAP: ICD-10-CM

## 2025-01-21 DIAGNOSIS — R11.0 NAUSEA: ICD-10-CM

## 2025-01-21 DIAGNOSIS — E78.2 MODERATE MIXED HYPERLIPIDEMIA NOT REQUIRING STATIN THERAPY: ICD-10-CM

## 2025-01-21 LAB
ALBUMIN SERPL-MCNC: 3.9 G/DL (ref 3.5–5.2)
ALBUMIN/GLOB SERPL: 1.2 G/DL
ALP SERPL-CCNC: 70 U/L (ref 39–117)
ALT SERPL W P-5'-P-CCNC: 20 U/L (ref 1–33)
ANION GAP SERPL CALCULATED.3IONS-SCNC: 12.9 MMOL/L (ref 5–15)
AST SERPL-CCNC: 14 U/L (ref 1–32)
BASOPHILS # BLD AUTO: 0.04 10*3/MM3 (ref 0–0.2)
BASOPHILS NFR BLD AUTO: 0.5 % (ref 0–1.5)
BILIRUB SERPL-MCNC: 0.3 MG/DL (ref 0–1.2)
BUN SERPL-MCNC: 14 MG/DL (ref 6–20)
BUN/CREAT SERPL: 17.3 (ref 7–25)
CALCIUM SPEC-SCNC: 9.5 MG/DL (ref 8.6–10.5)
CHLORIDE SERPL-SCNC: 101 MMOL/L (ref 98–107)
CHOLEST SERPL-MCNC: 161 MG/DL (ref 0–200)
CO2 SERPL-SCNC: 25.1 MMOL/L (ref 22–29)
CREAT SERPL-MCNC: 0.81 MG/DL (ref 0.57–1)
DEPRECATED RDW RBC AUTO: 41.3 FL (ref 37–54)
EGFRCR SERPLBLD CKD-EPI 2021: 93.1 ML/MIN/1.73
EOSINOPHIL # BLD AUTO: 0.21 10*3/MM3 (ref 0–0.4)
EOSINOPHIL NFR BLD AUTO: 2.9 % (ref 0.3–6.2)
ERYTHROCYTE [DISTWIDTH] IN BLOOD BY AUTOMATED COUNT: 13.2 % (ref 12.3–15.4)
GLOBULIN UR ELPH-MCNC: 3.3 GM/DL
GLUCOSE SERPL-MCNC: 94 MG/DL (ref 65–99)
HBA1C MFR BLD: 5 % (ref 4.8–5.6)
HCT VFR BLD AUTO: 43.8 % (ref 34–46.6)
HDLC SERPL-MCNC: 41 MG/DL (ref 40–60)
HGB BLD-MCNC: 14.8 G/DL (ref 12–15.9)
IMM GRANULOCYTES # BLD AUTO: 0.05 10*3/MM3 (ref 0–0.05)
IMM GRANULOCYTES NFR BLD AUTO: 0.7 % (ref 0–0.5)
LDLC SERPL CALC-MCNC: 103 MG/DL (ref 0–100)
LDLC/HDLC SERPL: 2.49 {RATIO}
LYMPHOCYTES # BLD AUTO: 1.9 10*3/MM3 (ref 0.7–3.1)
LYMPHOCYTES NFR BLD AUTO: 26.1 % (ref 19.6–45.3)
MCH RBC QN AUTO: 29.2 PG (ref 26.6–33)
MCHC RBC AUTO-ENTMCNC: 33.8 G/DL (ref 31.5–35.7)
MCV RBC AUTO: 86.4 FL (ref 79–97)
MONOCYTES # BLD AUTO: 0.5 10*3/MM3 (ref 0.1–0.9)
MONOCYTES NFR BLD AUTO: 6.9 % (ref 5–12)
NEUTROPHILS NFR BLD AUTO: 4.59 10*3/MM3 (ref 1.7–7)
NEUTROPHILS NFR BLD AUTO: 62.9 % (ref 42.7–76)
NRBC BLD AUTO-RTO: 0 /100 WBC (ref 0–0.2)
PLATELET # BLD AUTO: 200 10*3/MM3 (ref 140–450)
PMV BLD AUTO: 11.2 FL (ref 6–12)
POTASSIUM SERPL-SCNC: 4 MMOL/L (ref 3.5–5.2)
PROT SERPL-MCNC: 7.2 G/DL (ref 6–8.5)
RBC # BLD AUTO: 5.07 10*6/MM3 (ref 3.77–5.28)
SODIUM SERPL-SCNC: 139 MMOL/L (ref 136–145)
T-UPTAKE NFR SERPL: 1.06 TBI (ref 0.8–1.3)
T4 SERPL-MCNC: 7.22 MCG/DL (ref 4.5–11.7)
TRIGL SERPL-MCNC: 90 MG/DL (ref 0–150)
TSH SERPL DL<=0.05 MIU/L-ACNC: 1.72 UIU/ML (ref 0.27–4.2)
VLDLC SERPL-MCNC: 17 MG/DL (ref 5–40)
WBC NRBC COR # BLD AUTO: 7.29 10*3/MM3 (ref 3.4–10.8)

## 2025-01-21 PROCEDURE — 83036 HEMOGLOBIN GLYCOSYLATED A1C: CPT

## 2025-01-21 PROCEDURE — 84479 ASSAY OF THYROID (T3 OR T4): CPT

## 2025-01-21 PROCEDURE — 99214 OFFICE O/P EST MOD 30 MIN: CPT

## 2025-01-21 PROCEDURE — 84436 ASSAY OF TOTAL THYROXINE: CPT

## 2025-01-21 PROCEDURE — 80050 GENERAL HEALTH PANEL: CPT

## 2025-01-21 PROCEDURE — 80061 LIPID PANEL: CPT

## 2025-01-21 PROCEDURE — 36415 COLL VENOUS BLD VENIPUNCTURE: CPT

## 2025-01-21 PROCEDURE — 82670 ASSAY OF TOTAL ESTRADIOL: CPT

## 2025-01-21 PROCEDURE — 83001 ASSAY OF GONADOTROPIN (FSH): CPT

## 2025-01-21 RX ORDER — ONDANSETRON 8 MG/1
8 TABLET, FILM COATED ORAL EVERY 8 HOURS PRN
Qty: 16 TABLET | Refills: 1 | Status: SHIPPED | OUTPATIENT
Start: 2025-01-21

## 2025-01-21 RX ORDER — AZITHROMYCIN 250 MG/1
TABLET, FILM COATED ORAL
Qty: 6 TABLET | Refills: 0 | Status: SHIPPED | OUTPATIENT
Start: 2025-01-21

## 2025-01-21 NOTE — PROGRESS NOTES
Chief Complaint  Chief Complaint   Patient presents with    Obesity     Discuss increasing dosage of Zepbound, currently on 7.5 mg    Depression    Anxiety    Sinus Problem     Pt c/o green nasal discharge and sinus pressure       Subjective      Sunitha Ly presents to [facility]  History of Present Illness  The patient is a 44-year-old female who presents today for follow-up on obesity, depression, anxiety, and hepatic steatosis. She is currently on Zepbound and has lost quite a bit of weight.    She has been experiencing significant gastrointestinal disturbances since starting the 7.5 mg dose of Zepbound. Initially, she suffered from severe constipation, which was managed with MiraLAX. However, following the administration of the 7.5 mg dose, she experienced an episode of watery diarrhea accompanied by intense stomach cramps and nausea during a trip to [place]. This episode was initially attributed to anxiety and the new medication. She took Dramamine for motion sickness. She also vomited once in the middle of the night. The symptoms resolved the next day but have since recurred intermittently, typically manifesting over the weekend after her Wednesday injection. She has not identified any specific food triggers, although she has noticed that popcorn can sometimes induce nausea. She has not observed any mucus or blood in her stool. She has not taken metformin since Christmas break. She has not taken any medications for about a month. She has not taken Lexapro or BuSpar. She does not eat anything before she leaves for work. She tries to get some protein shake in before she takes her medicine. She has tried taking Zofran when the symptoms occur, but it does not help. She feels very bloated when it happens. Gassiness has decreased drastically since being on Zepbound. She burps a lot. She does not have as much indigestion as she used to have before. She only gets those burps  when the symptoms are about to start and when she is going through it. She does not eat a lot of vegetables. She has no gallbladder. She has been mixing pumpkin spices protein shakes with instant coffee. She feels like she has a decent bowel movement. She picks protein first and then vegetable for dinner. She has not had any issues since her last ER visit. Any pains that she has had in that area since she could relate more to digestive issues because it has been when she has been super constipated. She has not noticed mucus or blood in her stool. She recalls a past diagnosis of irritable bowel syndrome (IBS).    She has been experiencing sinus pressure, which is more pronounced in the mornings, and has been associated with severe headaches. She reports postnasal drainage and occasional feverish sensations, although she has not confirmed a fever. She also reports facial flushing and a decrease in her sense of smell and taste. She has not been taking any medication for these symptoms.          Objective     Medical History:  Past Medical History:   Diagnosis Date    Abnormal Pap smear of cervix     ADHD     Anxiety and depression     Arthritis     Chlamydia     Diabetes mellitus     PRE DIABETES- TAKES MEDS- DOESN'T CHECK BG AT HOME    Endometriosis     Gonorrhea     Kidney stone     Left ovarian cyst     Migraine without aura     Ovarian cyst     Periarthritis of shoulder     Placenta accreta     Urinary tract infection      Past Surgical History:   Procedure Laterality Date     SECTION      3    CHOLECYSTECTOMY      CYSTOSCOPY BLADDER STONE LITHOTRIPSY      HYSTERECTOMY      emergency post partum, Placenta accreta, supra cervical    SALPINGO OOPHORECTOMY N/A 2024    Procedure: DIAGNOSTIC LAPAROSCOPY;  Surgeon: Iraida Anderson DO;  Location: [facility] MAIN OR;  Service: Gynecology;  Laterality: N/A;    WISDOM TOOTH EXTRACTION        Social History     Tobacco Use    Smoking status: Never     Smokeless tobacco: Never   Vaping Use    Vaping status: Never Used   Substance Use Topics    Alcohol use: Yes     Comment: I only drink socially and not much at any one time.    Drug use: Never     Family History   Problem Relation Age of Onset    Hypertension Father     Sleep apnea Father     Arthritis Father     Breast cancer Mother     Arthritis Mother     Hypertension Brother     Melanoma Paternal Grandfather     Prostate cancer Maternal Grandfather     Uterine cancer Neg Hx     Ovarian cancer Neg Hx     Colon cancer Neg Hx        Medications:  Prior to Admission medications    Medication Sig Start Date End Date Taking? Authorizing Provider   acetaminophen (Tylenol) 325 MG tablet Take 2 tablets by mouth Every 4 (Four) Hours As Needed for Moderate Pain. 6/25/24 6/25/25  Iraida Anderson DO   buPROPion XL (WELLBUTRIN XL) 150 MG 24 hr tablet TAKE 1 TABLET BY MOUTH DAILY 1/14/25   Radha Sethi APRN   busPIRone (BUSPAR) 5 MG tablet TAKE 1 TABLET BY MOUTH 3 TIMES A DAY 10/30/24   Radha Sethi APRN   escitalopram (LEXAPRO) 10 MG tablet TAKE 1 TABLET BY MOUTH DAILY 10/30/24   Radha Sethi APRN   Hydrocortisone, Perianal, (Anusol-HC) 2.5 % rectal cream Apply rectally 2 times daily 3/8/24 3/8/25  Leanne Jimenez APRN   ibuprofen (ADVIL,MOTRIN) 600 MG tablet Take 1 tablet by mouth Every 6 (Six) Hours As Needed for Mild Pain or Moderate Pain. 6/25/24   Iraida Anderson DO   metFORMIN ER (GLUCOPHAGE-XR) 500 MG 24 hr tablet TAKE 1 TABLET BY MOUTH DAILY WITH BREAKFAST 9/3/24   Radha Sethi APRN   methylPREDNISolone (MEDROL) 4 MG dose pack Use as directed by package instructions 12/23/24   Brittney Sexton PA-C   ondansetron (ZOFRAN) 4 MG tablet  7/3/24   Provider, MD Joanne   ondansetron ODT (ZOFRAN-ODT) 4 MG disintegrating tablet Place 1 tablet on the tongue Every 8 (Eight) Hours As Needed for Nausea or Vomiting. 12/7/23   Radha Sethi APRN   vitamin  "B-12 (CYANOCOBALAMIN) 1000 MCG tablet Take 1 tablet by mouth Daily.    Provider, MD Joanne   Vitamin D, Cholecalciferol, (CHOLECALCIFEROL) 10 MCG (400 UNIT) tablet Take 500 Units by mouth Daily.    Provider, MD Joanne   Zepbound 7.5 MG/0.5ML solution auto-injector INJECT 7.5 MG UNDER THE SKIN ONCE WEEKLY 1/14/25   Radha Sethi APRN   Tirzepatide-Weight Management (ZEPBOUND) 5 MG/0.5ML solution auto-injector Inject 0.5 mL under the skin into the appropriate area as directed 1 (One) Time Per Week. 7/2/24 1/21/25  Radha Sethi APRN        Allergies:   Patient has no known allergies.    Health Maintenance Due   Topic Date Due    Pneumococcal Vaccine 0-64 (2 of 2 - PPSV23 or PCV20) 03/16/2015    LIPID PANEL  03/31/2024    COVID-19 Vaccine (3 - 2024-25 season) 09/01/2024    TDAP/TD VACCINES (2 - Td or Tdap) 11/18/2024         Vital Signs:   /78 (BP Location: Left arm, Patient Position: Sitting, Cuff Size: Adult)   Pulse 83   Temp 98 °F (36.7 °C) (Oral)   Ht 152.4 cm (60\")   Wt 96.3 kg (212 lb 6.4 oz)   SpO2 99%   BMI 41.48 kg/m²     Wt Readings from Last 3 Encounters:   01/21/25 96.3 kg (212 lb 6.4 oz)   12/23/24 96.2 kg (212 lb)   11/01/24 96.2 kg (212 lb 1.6 oz)     BP Readings from Last 3 Encounters:   01/21/25 122/78   12/23/24 130/79   11/01/24 123/78                Physical Exam  Vitals reviewed.   Constitutional:       Appearance: Normal appearance. She is well-developed. She is obese.   HENT:      Head: Normocephalic and atraumatic.   Eyes:      Conjunctiva/sclera: Conjunctivae normal.      Pupils: Pupils are equal, round, and reactive to light.   Cardiovascular:      Rate and Rhythm: Normal rate and regular rhythm.      Heart sounds: No murmur heard.     No friction rub. No gallop.   Pulmonary:      Effort: Pulmonary effort is normal.      Breath sounds: Normal breath sounds. No wheezing or rhonchi.   Abdominal:      General: Bowel sounds are normal. There is no " distension.      Palpations: Abdomen is soft.      Tenderness: There is no abdominal tenderness.   Skin:     General: Skin is warm and dry.   Neurological:      Mental Status: She is alert and oriented to person, place, and time.      Cranial Nerves: No cranial nerve deficit.   Psychiatric:         Mood and Affect: Mood and affect normal.         Behavior: Behavior normal.         Thought Content: Thought content normal.         Judgment: Judgment normal.       Physical Exam  Lungs are clear.    Vital Signs  Weight is 212.      Result Review :    The following data was reviewed by SOY Ghosh on 25 at 16:35 EST:    Common labs          2024    09:28 2024    08:31   Common Labs   Glucose 118  106    BUN 11  12    Creatinine 0.77  0.70    Sodium 139  137    Potassium 4.3  3.8    Chloride 103  104    Calcium 8.9  8.7    Albumin 4.2     Total Bilirubin 0.4     Alkaline Phosphatase 59     AST (SGOT) 13     ALT (SGPT) 21     WBC 5.86     Hemoglobin 14.9     Hematocrit 44.4     Platelets 170           US Pelvic, Non OB, Transvaginal Only    Result Date: 10/9/2024  from the original result were not included. REVIEWING YOUR TEST RESULTS IN [facility] IS NOT A SUBSTITUTE FOR DISCUSSING THOSE RESULTS WITH YOUR HEALTH CARE PROVIDER. PLEASE CONTACT YOUR PROVIDER VIA [facility] TO DISCUSS ANY QUESTIONS OR CONCERNS YOU MAY HAVE REGARDING THESE TEST RESULTS. Gynecological Report               RADIOLOGY REPORT    FACILITY:  [facility] ROOM NUMBER:   PATIENT NAME/:  EUFEMIA LIANG    1980 MRN NUMBER:  [id number] ACCOUNT NUMBER:  [id number] ACCESSION NUMBER:  [id number]    DATE OF EXAM:  10/09/2024 EXAMINATION(S):  US PELVIC, NON OB, TRANSVAGINAL ONLY       PERFORMED BY:     Attending:        George Becerra MD  Performed By:     Elisabeth Nava RDMS  [facility]  ---------------------------------------------------------------------- ORDERS:     #  Description                       Code        Ordered By  1  US PELVIC, NON OB,                US#[id number]     TIESHA     TRANSVAGINAL ONLY                 [id number]        REINSTINE ----------------------------------------------------------------------     #  Order #                  Accession #              Episode #  1  [id number]                [id number]           [id number] ---------------------------------------------------------------------- SERVICE(S) PROVIDED:     US PELVIC, NON OB, TRANSVAGINAL ONLY                  US#[id number]  GYN: Transvaginal Ultrasound                          [id number]  ---------------------------------------------------------------------- INDICATIONS:     Ovarian cysts ---------------------------------------------------------------------- TECHNIQUE/SCAN QUALITY:     Technique:      Transvaginal imaging was utilized to                  obtain finer detail. ---------------------------------------------------------------------- HISTORY:     Age:   44 ---------------------------------------------------------------------- UTERUS:     Uterus:     Surgically absent  Cervix Length:    3.6  cm ---------------------------------------------------------------------- ENDOMETRIUM:    ---------------------------------------------------------------------- CERVIX:     Normal appearance ---------------------------------------------------------------------- CUL-DE-SAC:     No fluid was visualized ---------------------------------------------------------------------- RIGHT OVARY:     Status:   Visualized  Size (cm)    L:  4.4       W:   1.8        H:  2.4  Vol (ml):    10.0  Morphology:    Complex cyst ---------------------------------------------------------------------- LEFT OVARY:     Status:   Visualized  Size (cm)    L:  3.2       W:   2.0        H:  2.3  Vol (ml):    7.7  Morphology:    Complex cyst  Size (cm)    L:   1.5       W:   1.5        H:  1.3  Vol (ml):    1.5 ---------------------------------------------------------------------- COMMENTS:    The images were reviewed and discussed with the patient at the time of the study. 10/9/2024 AOG ultrasound  UT surgically removed  RT ovary : two complex cysts noted measuring 1.15 x  .99 x 1.14cm and 1.37 x 1.25 x 1.23cm  LT ovary : complex cyst measuring 1.50 x 1.45 x  1.30cm  No FF or adnexal masses noted at this time     This exam should be considered preliminary until  viewed and signed by a Physician ----------------------------------------------------------------------                Elisabeth Nava RDMS       Reviewed by: George Becerra MD    <AS><Preliminary - Signed Copy is Final>            Results                 Assessment and Plan    Diagnoses and all orders for this visit:    1. Class 3 severe obesity due to excess calories with serious comorbidity and body mass index (BMI) of 45.0 to 49.9 in adult (Primary)  -     Tirzepatide-Weight Management (ZEPBOUND) 10 MG/0.5ML solution auto-injector; Inject 0.5 mL under the skin into the appropriate area as directed 1 (One) Time Per Week.  Dispense: 2 mL; Refill: 1  -     Thyroid Panel With TSH  -     Hemoglobin A1c    2. Hepatic steatosis    3. ANGELICA on CPAP    4. Mild episode of recurrent major depressive disorder    5. Generalized anxiety disorder    6. Moderate mixed hyperlipidemia not requiring statin therapy  -     CBC & Differential  -     Comprehensive Metabolic Panel  -     Lipid Panel    7. Nausea  -     ondansetron (ZOFRAN) 8 MG tablet; Take 1 tablet by mouth Every 8 (Eight) Hours As Needed for Nausea or Vomiting.  Dispense: 16 tablet; Refill: 1    8. Diarrhea, unspecified type    9. Prediabetes  -     Hemoglobin A1c       Assessment & Plan  1. Obesity.  She has experienced significant weight loss, reducing from a peak of 250 pounds to a stable weight of 212 pounds over the past few months. She is currently on  Zepbound 7.5 mg. The dosage of Zepbound will be increased to 10 mg. If this exacerbates her gastrointestinal symptoms, the dosage will be reduced back to 7.5 mg. She is advised to maintain a food diary to identify any potential dietary triggers for her symptoms. She is also encouraged to ensure adequate protein intake.    2. Depression.  She has not been taking her medications, including Lexapro and BuSpar, for about a month. She is advised to resume her medication regimen.    3. Anxiety.  She has not been taking her medications, including Lexapro and BuSpar, for about a month. She is advised to resume her medication regimen.    4. Hepatic steatosis.  She is advised to continue her current treatment plan.    5. Sinusitis.  A prescription for Z-Rosendo has been provided, with instructions to take 2 tablets on the first day, followed by 1 tablet on days 2, 3, and 5. Over-the-counter Mucinex is recommended to help loosen secretions. She is also advised to increase her water intake to thin out the secretions.    6. Gastrointestinal disturbance.  She has been experiencing significant gastrointestinal disturbances since starting the 7.5 mg dose of Zepbound. Initially, she suffered from severe constipation, which was managed with MiraLAX. However, following the administration of the 7.5 mg dose, she experienced an episode of watery diarrhea accompanied by intense stomach cramps and nausea. She is advised to discontinue metformin due to its potential to exacerbate diarrhea. A prescription for Zofran 8 mg has been provided to manage nausea. She is also advised to take Imodium as needed for diarrhea. If gastrointestinal symptoms persist, a colonoscopy may be considered to rule out inflammatory bowel disease or other causes.    Follow-up  The patient will follow up in 6 months for her annual physical or earlier if necessary.          Smoking Cessation:    Sunitha Hart Aliyah  reports that she has never smoked. She has never used  smokeless tobacco.             Follow Up   No follow-ups on file.  Patient was given instructions and counseling regarding her condition or for health maintenance advice. Please see specific information pulled into the AVS if appropriate.     Please note that portions of this note were completed with a voice recognition program.    Patient or patient representative verbalized consent for the use of Ambient Listening during the visit with  SOY Ghosh for chart documentation. 1/21/2025  16:42 EST

## 2025-01-24 ENCOUNTER — OFFICE VISIT (OUTPATIENT)
Dept: OBSTETRICS AND GYNECOLOGY | Facility: CLINIC | Age: 45
End: 2025-01-24
Payer: COMMERCIAL

## 2025-01-24 VITALS
HEART RATE: 72 BPM | BODY MASS INDEX: 40.82 KG/M2 | WEIGHT: 209 LBS | DIASTOLIC BLOOD PRESSURE: 84 MMHG | SYSTOLIC BLOOD PRESSURE: 115 MMHG

## 2025-01-24 DIAGNOSIS — Z01.419 WELL WOMAN EXAM: Primary | ICD-10-CM

## 2025-01-24 DIAGNOSIS — Z12.31 ENCOUNTER FOR SCREENING MAMMOGRAM FOR MALIGNANT NEOPLASM OF BREAST: ICD-10-CM

## 2025-01-24 DIAGNOSIS — L29.0 RECTAL ITCHING: ICD-10-CM

## 2025-01-24 DIAGNOSIS — N95.1 PERIMENOPAUSAL SYMPTOMS: ICD-10-CM

## 2025-01-24 LAB
ESTRADIOL SERPL HS-MCNC: 103 PG/ML
FSH SERPL-ACNC: 5.36 MIU/ML

## 2025-01-24 PROCEDURE — G0123 SCREEN CERV/VAG THIN LAYER: HCPCS | Performed by: NURSE PRACTITIONER

## 2025-01-24 PROCEDURE — 87624 HPV HI-RISK TYP POOLED RSLT: CPT | Performed by: NURSE PRACTITIONER

## 2025-01-24 RX ORDER — HYDROCORTISONE 25 MG/G
CREAM TOPICAL 2 TIMES DAILY PRN
Qty: 28 G | Refills: 1 | Status: SHIPPED | OUTPATIENT
Start: 2025-01-24 | End: 2025-02-07

## 2025-01-29 LAB
CYTOLOGIST CVX/VAG CYTO: NORMAL
CYTOLOGY CVX/VAG DOC CYTO: NORMAL
CYTOLOGY CVX/VAG DOC THIN PREP: NORMAL
DX ICD CODE: NORMAL
HPV I/H RISK 4 DNA CVX QL PROBE+SIG AMP: NEGATIVE
Lab: NORMAL
Lab: NORMAL
OTHER STN SPEC: NORMAL
STAT OF ADQ CVX/VAG CYTO-IMP: NORMAL

## 2025-02-14 ENCOUNTER — CLINICAL SUPPORT (OUTPATIENT)
Dept: FAMILY MEDICINE CLINIC | Facility: CLINIC | Age: 45
End: 2025-02-14
Payer: COMMERCIAL

## 2025-02-14 DIAGNOSIS — T78.1XXA GASTROINTESTINAL FOOD SENSITIVITY: ICD-10-CM

## 2025-02-14 DIAGNOSIS — R19.7 DIARRHEA, UNSPECIFIED TYPE: ICD-10-CM

## 2025-02-14 DIAGNOSIS — R19.7 DIARRHEA, UNSPECIFIED TYPE: Primary | ICD-10-CM

## 2025-02-14 PROCEDURE — 86003 ALLG SPEC IGE CRUDE XTRC EA: CPT

## 2025-02-14 PROCEDURE — 82784 ASSAY IGA/IGD/IGG/IGM EACH: CPT

## 2025-02-14 PROCEDURE — 86364 TISS TRNSGLTMNASE EA IG CLAS: CPT

## 2025-02-14 PROCEDURE — 86231 EMA EACH IG CLASS: CPT

## 2025-02-17 DIAGNOSIS — F32.A DEPRESSION, UNSPECIFIED DEPRESSION TYPE: ICD-10-CM

## 2025-02-17 DIAGNOSIS — F41.9 ANXIETY: ICD-10-CM

## 2025-02-17 LAB
ENDOMYSIUM IGA SER QL: NEGATIVE
IGA SERPL-MCNC: 316 MG/DL (ref 87–352)
TTG IGA SER-ACNC: <2 U/ML (ref 0–3)

## 2025-02-17 RX ORDER — BUSPIRONE HYDROCHLORIDE 5 MG/1
5 TABLET ORAL 3 TIMES DAILY
Qty: 90 TABLET | Refills: 2 | Status: SHIPPED | OUTPATIENT
Start: 2025-02-17

## 2025-02-17 RX ORDER — ESCITALOPRAM OXALATE 10 MG/1
10 TABLET ORAL DAILY
Qty: 30 TABLET | Refills: 2 | Status: SHIPPED | OUTPATIENT
Start: 2025-02-17

## 2025-02-19 DIAGNOSIS — E66.813 CLASS 3 SEVERE OBESITY DUE TO EXCESS CALORIES WITH SERIOUS COMORBIDITY AND BODY MASS INDEX (BMI) OF 45.0 TO 49.9 IN ADULT: ICD-10-CM

## 2025-02-19 DIAGNOSIS — E66.01 CLASS 3 SEVERE OBESITY DUE TO EXCESS CALORIES WITH SERIOUS COMORBIDITY AND BODY MASS INDEX (BMI) OF 45.0 TO 49.9 IN ADULT: ICD-10-CM

## 2025-02-19 LAB
CLAM IGE QN: <0.1 KU/L
CODFISH IGE QN: <0.1 KU/L
CONV CLASS DESCRIPTION: NORMAL
CORN IGE QN: <0.1 KU/L
COW MILK IGE QN: <0.1 KU/L
EGG WHITE IGE QN: <0.1 KU/L
PEANUT IGE QN: <0.1 KU/L
SCALLOP IGE QN: <0.1 KU/L
SESAME SEED IGE QN: <0.1 KU/L
SHRIMP IGE QN: <0.1 KU/L
SOYBEAN IGE QN: <0.1 KU/L
WALNUT IGE QN: <0.1 KU/L
WHEAT IGE QN: <0.1 KU/L

## 2025-03-03 ENCOUNTER — OFFICE VISIT (OUTPATIENT)
Dept: SLEEP MEDICINE | Facility: HOSPITAL | Age: 45
End: 2025-03-03
Payer: COMMERCIAL

## 2025-03-03 VITALS
WEIGHT: 210.1 LBS | HEIGHT: 60 IN | OXYGEN SATURATION: 100 % | HEART RATE: 73 BPM | SYSTOLIC BLOOD PRESSURE: 117 MMHG | BODY MASS INDEX: 41.25 KG/M2 | DIASTOLIC BLOOD PRESSURE: 66 MMHG

## 2025-03-03 DIAGNOSIS — G47.33 OBSTRUCTIVE SLEEP APNEA, ADULT: Primary | ICD-10-CM

## 2025-03-03 DIAGNOSIS — E66.813 CLASS 3 SEVERE OBESITY WITH SERIOUS COMORBIDITY AND BODY MASS INDEX (BMI) OF 40.0 TO 44.9 IN ADULT, UNSPECIFIED OBESITY TYPE: ICD-10-CM

## 2025-03-03 DIAGNOSIS — E66.01 CLASS 3 SEVERE OBESITY WITH SERIOUS COMORBIDITY AND BODY MASS INDEX (BMI) OF 40.0 TO 44.9 IN ADULT, UNSPECIFIED OBESITY TYPE: ICD-10-CM

## 2025-03-03 DIAGNOSIS — F32.A DEPRESSION, UNSPECIFIED DEPRESSION TYPE: ICD-10-CM

## 2025-03-03 DIAGNOSIS — F41.9 ANXIETY: ICD-10-CM

## 2025-03-03 PROCEDURE — G0463 HOSPITAL OUTPT CLINIC VISIT: HCPCS

## 2025-03-03 RX ORDER — HYDROCORTISONE 25 MG/ML
SOLUTION TOPICAL AS NEEDED
COMMUNITY
Start: 2025-01-27

## 2025-03-03 NOTE — PROGRESS NOTES
[address] [place] [id number]  Phone: [phone]  Fax: [phone]      SLEEP CLINIC FOLLOW UP PROGRESS NOTE.    Sunitha Ly  [id number]   1980  45 y.o.  female      PCP: Radha Sethi APRN      Date of visit: 3/3/2025    Chief Complaint   Patient presents with    Sleep Apnea       Medications and allergies are reviewed by me and documented in the encounter.     SOCIAL (habits pertaining to sleep medicine)  History tobacco use:No  History of alcohol use: 0 per week  Caffeine use: 1-2 beverages/d    HPI:  This is a 45 y.o. PMHx Anxiety/Depression. Here for management of obstructive sleep apnea (ROSA 19.9/hr with sleep-related hypoxia on 3/26/2024 HST; s/p overnight oximetry on PAP 8/2/2024 with oxygenation adequate on PAP).Patient is using positive airway pressure therapy and the symptoms of sleep apnea have improved significantly on the therapy. Normally patient goes to bed at 11 PM and wakes up at 6 AM .  The patient wakes up 0-2 time(s) during the night and has no problem going back to sleep.  Feels refreshed after waking up.     Overall patient's Impression of their PAP therapy is: Going well  She likes the AirFit F20 much better than Nasal wisp  Straps on new FFM uncomfortable having to overtighten them  Air pressures  feel too little now that she's using a full face mask which is making therapy uncomfortable   This is generally within the first 20 minutes of falling asleep not having break through apneic episodes the remainder of the night on PAP    Compliance data as reviewed by me with patient room today:  Date range 1/25/2025 - 2/23/2025  Overall use 97%  4 hour use 93%  Average days used 5 hours 49 minutes  Device AirSense 11 AutoSet  Settings 8-20 cm H2O  EPR 3 full-time  95th percentile pressure is 10.3 cm H2O  95th percentile leak is 12.8 LPM - over tightening straps clinically correlated  AHI 0.5-at goal  DME: [facility] mask: AirFit F20  "FFM  Prior mask used:  -Nasal wisp-difficult seal        -Anxiety/Depression  States she's doing well today  States she's been non-Compliant with her home therapies reviewed to include lexapro and buspar  States following closely with her PCP for same  States her mood has been doing really well lately off these medications and she's self monitoring her mood she was recommended to continue at her last PCP visit with the above medications      -Obesity  Recently on zepbound managed by her PCP  Working on weight loss   No personal or family history of MTC or MEN II  Body mass index is 41.03 kg/m².  Wt Readings from Last 3 Encounters:   03/03/25 95.3 kg (210 lb 1.6 oz)   01/24/25 94.8 kg (209 lb)   01/21/25 96.3 kg (212 lb 6.4 oz)           -Last seen in sleep clinic~4 months ago on 11/1/2024 AHI 0.6, D[facility], difficult seal with nasal wisp, she has sleep efficiency due to she had obligations caring for her  with recent knee surgery.  Ordered her AirFit F20 FFM    REVIEW OF SYSTEMS:   Is negative unless otherwise noted in HPI  Paulina Sleepiness Scale :Total score: 9     Disclaimer History: The above history is based on this sleep physician's in room encounter with the patient. Pre encounter self administered questionnaires are taken into consideration and discussed with patient for any discordance. The above documentation by this sleep physician is the most accurate clinical information determined by in room sleep physician encounter with patient.     PHYSICAL EXAMINATION:  Vitals:    03/03/25 0900   BP: 117/66   Pulse: 73   SpO2: 100%   Weight: 95.3 kg (210 lb 1.6 oz)   Height: 152.4 cm (60\")    Body mass index is 41.03 kg/m².   CONSTITUTIONAL: Well appearing, in no overt pain or respiratory distress   NOSE: No septal defect  RESP SYSTEM:  No overt respiratory distress, speaks in clear sentences without dyspnea, no accessory muscle use  CARDIOVASULAR: No edema noted  NEURO: Oriented x 3, no gross " focal deficits   Psychiatric: Affect is full range and appropriate for visit, smiling, goal oriented and motivated to continue with PAP therapy      Records reviewed  - 1/21/2025 office visit PCP was non-compliant with home meds for anxiety/depression she was advised to resume her medication regimen at that time        Compliance data as reviewed by me with patient room today:  Date range 1/25/2025 - 2/23/2025  Overall use 97%  4 hour use 93%  Average days used 5 hours 49 minutes  Device AirSense 11 AutoSet  Settings 8-20 cm H2O  EPR 3 full-time  95th percentile pressure is 10.3 cm H2O  95th percentile leak is 12.8 LPM - over tightening straps clinically correlated  AHI 0.5-at goal  DME: [employer]  Current mask: AirFit F20 FFM  Prior mask used:  -Nasal wisp-difficult seal    ASSESSMENT AND PLAN:  Obstructive sleep apnea ( G 47.33).    -Specific Changes made by me today:  I. After review of compliance data, in visit clinical correlation, and through shared decision making:     Now that she's on full face mask we'll work towards P95 to make therapy more comfortable for her:  Increased min pressure to 9 cm H2O  Added ramp with soft response for 9 cm H2O for 20 minutes  Screenshot below of the new settings I personally adjusted  on airview  -    II. r P95 leak was slightly on the lower side in my clinical experience and clinically correlated to this patient often seen when the patient is over tightening straps on mask: PAP strap covers to help not tighten straps too much. Counseled obtain OTC PAP strap covers prn comfort had her take a picture of same on amaz[employer]  III.  I gave patient options of interval follow ups as far as 1 year out she requested 4 month follow up with mask issues and setting changes this is a reasonable request. Counseled patient to follow-up with me in 4 MONTHS for therapy review.  Counseled may request sooner follow-up to sleep clinic anytime the patient feels necessary.  The symptoms of  sleep apnea have improved with the device and the treatment.  Patient's compliance with the device is excellent for treatment of sleep apnea.  I have independently reviewed the smart card down load and discussed with the patient the download data and encouarged the patient to continue to use the device.The residual AHI is acceptable. The device is benefiting the patient and the device is medically necessary.  Without proper control of sleep apnea and good compliance there is a increased risk for hypertension, diabetes mellitus and nonrestorative sleep with hypersomnia which can increase risk for motor vehicle accidents.  Untreated sleep apnea is also a risk factor for development of atrial fibrillation, pulmonary hypertension, insulin resistance and stroke. The patient is also instructed to get the supplies from the [employer] and and change them on a regular basis.  A prescription for supplies has been sent to the [employer].  I have also discussed the good sleep hygiene habits and adequate amount of sleep needed for good health.  Obesity   with BMI is Body mass index is 41.03 kg/m²..  Continue zepboudn with PCP - this medication recently approved at certain doses starting at 10 mg by FDA for sleep apnea (counseled patient not sole treatment for sleep apnea must continue on PAP per AASM recommendation). Counseled weight loss will be beneficial for reduction in severity of sleep apnea, healthy diet/exercise to achieve same, follow up with primary care physician for serial monitoring and to further guide management.   Anxiety/Depression, Non-compliant with her home therapies reviewed. Counseled compliance with her home therapies and to follow closely with prescribing PCP for serial-evaluation/monitoring/further-direction regarding mood disorders.Counseled patient PAP therapy compliance for treatment of sleep apnea potentially beneficial towards benefiting her comorbid mood disorders for optimal sleep health. Follow  up with treating clinician for mood disorders as previous.       Follow up in 4 Months . Patient's questions were answered.    Time based visit 40 minutes: to include in room history/exam/extensive counseling/review of plan with patient/SAME DAY: review of medications/review of diagnostics/independent conclusions drawn from prior diagnostics - will adjust air pressures towards P95 pressure her P95 leak was slightly on the lower side PAP strap covers to help not tighten straps too much/ review of prior medical records / complex medical decision making leading to I took the time to personally log in to [employer] to make above setting changes order for PAP supplies / not optimized for annual follow up in 4 months      EMR Dragon/Transcription disclaimer:   Much of this encounter note is an electronic transcription/translation of spoken language to printed text. The electronic translation of spoken language may permit erroneous, or at times, nonsensical words or phrases to be inadvertently transcribed; Although I have reviewed the note for such errors, some may still exist.       NPI #: [id number]    Caitlin Stubbs, DO  Sleep Medicine  [facility]  03/03/25

## 2025-03-31 ENCOUNTER — PATIENT MESSAGE (OUTPATIENT)
Dept: FAMILY MEDICINE CLINIC | Facility: CLINIC | Age: 45
End: 2025-03-31
Payer: COMMERCIAL

## 2025-03-31 DIAGNOSIS — E66.01 CLASS 3 SEVERE OBESITY DUE TO EXCESS CALORIES WITH SERIOUS COMORBIDITY AND BODY MASS INDEX (BMI) OF 45.0 TO 49.9 IN ADULT: ICD-10-CM

## 2025-03-31 DIAGNOSIS — E66.813 CLASS 3 SEVERE OBESITY DUE TO EXCESS CALORIES WITH SERIOUS COMORBIDITY AND BODY MASS INDEX (BMI) OF 45.0 TO 49.9 IN ADULT: ICD-10-CM

## 2025-04-04 NOTE — TELEPHONE ENCOUNTER
"I manually completed a new PA because I have not received one for the Zepbound and it stated, \"Your PA has been resolved, no additional PA is required. For further inquiries please contact the number on the back of the member prescription card.\" This means the other PA that was approved is still active.  "

## 2025-04-22 ENCOUNTER — LAB (OUTPATIENT)
Facility: HOSPITAL | Age: 45
End: 2025-04-22
Payer: COMMERCIAL

## 2025-04-22 ENCOUNTER — TELEPHONE (OUTPATIENT)
Dept: FAMILY MEDICINE CLINIC | Facility: CLINIC | Age: 45
End: 2025-04-22
Payer: COMMERCIAL

## 2025-04-22 DIAGNOSIS — R19.7 DIARRHEA, UNSPECIFIED TYPE: ICD-10-CM

## 2025-04-22 DIAGNOSIS — R19.7 DIARRHEA, UNSPECIFIED TYPE: Primary | ICD-10-CM

## 2025-04-22 LAB
027 TOXIN: NORMAL
C DIFF TOX GENS STL QL NAA+PROBE: NEGATIVE
H. PYLORI ANTIGEN STOOL: NEGATIVE
HEMOCCULT STL QL IA: NEGATIVE
LACTOFERRIN STL QL LA: NEGATIVE

## 2025-04-22 PROCEDURE — 87338 HPYLORI STOOL AG IA: CPT

## 2025-04-22 PROCEDURE — 82274 ASSAY TEST FOR BLOOD FECAL: CPT

## 2025-04-22 PROCEDURE — 82653 EL-1 FECAL QUANTITATIVE: CPT

## 2025-04-22 PROCEDURE — 87506 IADNA-DNA/RNA PROBE TQ 6-11: CPT

## 2025-04-22 PROCEDURE — 83993 ASSAY FOR CALPROTECTIN FECAL: CPT

## 2025-04-22 PROCEDURE — 83630 LACTOFERRIN FECAL (QUAL): CPT

## 2025-04-22 PROCEDURE — 87493 C DIFF AMPLIFIED PROBE: CPT

## 2025-04-22 NOTE — TELEPHONE ENCOUNTER
Phone call received from the outpatient lab at [facility] requesting an order for the stool specimen that the patient brought to them. Spoke with the provider who is placing orders for the patient. Informed lab of the status of orders with voiced understanding.

## 2025-04-23 LAB
C COLI+JEJ+UPSA DNA STL QL NAA+NON-PROBE: NOT DETECTED
CRYPTOSP DNA STL QL NAA+NON-PROBE: NOT DETECTED
E HISTOLYT DNA STL QL NAA+NON-PROBE: NOT DETECTED
EC STX1+STX2 GENES STL QL NAA+NON-PROBE: NOT DETECTED
G LAMBLIA DNA STL QL NAA+NON-PROBE: NOT DETECTED
S ENT+BONG DNA STL QL NAA+NON-PROBE: NOT DETECTED
SHIGELLA SP+EIEC IPAH ST NAA+NON-PROBE: NOT DETECTED

## 2025-04-24 LAB — CALPROTECTIN STL-MCNT: 13 UG/G (ref 0–120)

## 2025-04-25 LAB — ELASTASE PANC STL-MCNT: >800 UG ELAST./G

## 2025-05-06 DIAGNOSIS — T78.1XXA GASTROINTESTINAL FOOD SENSITIVITY: ICD-10-CM

## 2025-05-06 DIAGNOSIS — R19.7 DIARRHEA, UNSPECIFIED TYPE: Primary | ICD-10-CM

## 2025-06-06 DIAGNOSIS — E66.813 CLASS 3 SEVERE OBESITY DUE TO EXCESS CALORIES WITH SERIOUS COMORBIDITY AND BODY MASS INDEX (BMI) OF 45.0 TO 49.9 IN ADULT: Primary | ICD-10-CM

## 2025-06-06 DIAGNOSIS — E66.01 CLASS 3 SEVERE OBESITY DUE TO EXCESS CALORIES WITH SERIOUS COMORBIDITY AND BODY MASS INDEX (BMI) OF 45.0 TO 49.9 IN ADULT: Primary | ICD-10-CM

## 2025-06-06 RX ORDER — SEMAGLUTIDE 0.5 MG/.5ML
0.5 INJECTION, SOLUTION SUBCUTANEOUS WEEKLY
Qty: 2 ML | Refills: 0 | Status: SHIPPED | OUTPATIENT
Start: 2025-06-06

## 2025-06-25 DIAGNOSIS — R11.0 NAUSEA: ICD-10-CM

## 2025-06-25 RX ORDER — ONDANSETRON 8 MG/1
8 TABLET, FILM COATED ORAL EVERY 8 HOURS PRN
Qty: 16 TABLET | Refills: 1 | Status: SHIPPED | OUTPATIENT
Start: 2025-06-25

## 2025-06-29 DIAGNOSIS — E66.813 CLASS 3 SEVERE OBESITY DUE TO EXCESS CALORIES WITH SERIOUS COMORBIDITY AND BODY MASS INDEX (BMI) OF 45.0 TO 49.9 IN ADULT: ICD-10-CM

## 2025-06-30 ENCOUNTER — OFFICE VISIT (OUTPATIENT)
Dept: ORTHOPEDIC SURGERY | Facility: CLINIC | Age: 45
End: 2025-06-30
Payer: COMMERCIAL

## 2025-06-30 VITALS
BODY MASS INDEX: 38.48 KG/M2 | DIASTOLIC BLOOD PRESSURE: 79 MMHG | SYSTOLIC BLOOD PRESSURE: 114 MMHG | HEIGHT: 60 IN | HEART RATE: 76 BPM | WEIGHT: 196 LBS | OXYGEN SATURATION: 98 %

## 2025-06-30 DIAGNOSIS — M70.62 TROCHANTERIC BURSITIS OF LEFT HIP: ICD-10-CM

## 2025-06-30 DIAGNOSIS — M25.552 BILATERAL HIP PAIN: Primary | ICD-10-CM

## 2025-06-30 DIAGNOSIS — M70.61 TROCHANTERIC BURSITIS OF RIGHT HIP: ICD-10-CM

## 2025-06-30 DIAGNOSIS — M25.551 BILATERAL HIP PAIN: Primary | ICD-10-CM

## 2025-06-30 PROCEDURE — 99213 OFFICE O/P EST LOW 20 MIN: CPT | Performed by: ORTHOPAEDIC SURGERY

## 2025-06-30 PROCEDURE — 20610 DRAIN/INJ JOINT/BURSA W/O US: CPT | Performed by: ORTHOPAEDIC SURGERY

## 2025-06-30 RX ORDER — LIDOCAINE HYDROCHLORIDE 10 MG/ML
5 INJECTION, SOLUTION INFILTRATION; PERINEURAL
Status: COMPLETED | OUTPATIENT
Start: 2025-06-30 | End: 2025-06-30

## 2025-06-30 RX ORDER — TRIAMCINOLONE ACETONIDE 40 MG/ML
40 INJECTION, SUSPENSION INTRA-ARTICULAR; INTRAMUSCULAR
Status: COMPLETED | OUTPATIENT
Start: 2025-06-30 | End: 2025-06-30

## 2025-06-30 RX ADMIN — LIDOCAINE HYDROCHLORIDE 5 ML: 10 INJECTION, SOLUTION INFILTRATION; PERINEURAL at 14:26

## 2025-06-30 RX ADMIN — TRIAMCINOLONE ACETONIDE 40 MG: 40 INJECTION, SUSPENSION INTRA-ARTICULAR; INTRAMUSCULAR at 14:26

## 2025-06-30 NOTE — PROGRESS NOTES
"Chief Complaint  Initial Evaluation of the Right Hip and Initial Evaluation of the Left Hip       Subjective      Sunitha Ly presents to [facility] for an evaluation  of bilateral hip. She hips have been bothering her for several years but states the pain has gradually gotten worse. She locates her pain to the lateral  aspect of her hip. She denies any specific injury, trauma, falls or prior surgery to her hips. She has pain at nighttime and when laying down.     No Known Allergies     Social History     Socioeconomic History    Marital status:    Tobacco Use    Smoking status: Never    Smokeless tobacco: Never   Vaping Use    Vaping status: Never Used   Substance and Sexual Activity    Alcohol use: Yes     Comment: I only drink socially and not much at any one time.    Drug use: Never    Sexual activity: Yes     Partners: Male     Birth control/protection: None, Hysterectomy     Comment: Supracervical Hyst        I reviewed the patient's chief complaint, history of present illness, review of systems, past medical history, surgical history, family history, social history, medications, and allergy list.     Review of Systems     Constitutional: Denies fevers, chills, weight loss  Cardiovascular: Denies chest pain, shortness of breath  Skin: Denies rashes, acute skin changes  Neurologic: Denies headache, loss of consciousness  MSK: bilateral hip pain       Vital Signs:   /79   Pulse 76   Ht 152.4 cm (60\")   Wt 88.9 kg (196 lb)   SpO2 98%   BMI 38.28 kg/m²          Physical Exam  General: Alert. No acute distress    Ortho Exam      Bilateral lower extremity: tender to the lateral  hip, smooth and intact hip range of motion, distal neurovascularly intact, positive  pulses, positive EHL, FHL, GS, and TA. Sensation intact to all 5 nerves of the foot.     Large Joint: R greater trochanteric bursa  Date/Time: 6/30/2025 2:26 PM  Consent given by: patient  Site marked: " site marked  Timeout: Immediately prior to procedure a time out was called to verify the correct patient, procedure, equipment, support staff and site/side marked as required   Supporting Documentation  Indications: pain   Procedure Details  Location: hip - R greater trochanteric bursa  Preparation: Patient was prepped and draped in the usual sterile fashion  Needle gauge: 21g.  Medications administered: 5 mL lidocaine 1 %; 40 mg triamcinolone acetonide 40 MG/ML  Patient tolerance: patient tolerated the procedure well with no immediate complications      Large Joint: L greater trochanteric bursa  Date/Time: 6/30/2025 2:26 PM  Consent given by: patient  Site marked: site marked  Timeout: Immediately prior to procedure a time out was called to verify the correct patient, procedure, equipment, support staff and site/side marked as required   Supporting Documentation  Indications: pain   Procedure Details  Location: hip - L greater trochanteric bursa  Preparation: Patient was prepped and draped in the usual sterile fashion  Needle gauge: 21g.  Medications administered: 5 mL lidocaine 1 %; 40 mg triamcinolone acetonide 40 MG/ML  Patient tolerance: patient tolerated the procedure well with no immediate complications    This injection documentation was Scribed for Ramy Lopez MD by Delaney Jimenez MA.  06/30/25   14:27 EDT        Imaging Results (Most Recent)       Procedure Component Value Units Date/Time    XR Hips Bilateral With or Without Pelvis 3-4 View - In process [[id number]] Resulted: 06/30/25 1359     Updated: 06/30/25 1405    This result has not been signed. Information might be incomplete.               Result Review :     X-Ray Report:  Bilateral hip X-Ray  Indication: Evaluation of bilateral hip pain   AP/Lateral view(s)  Findings: joint space is preserved, no significant degenerative changes to the hips, no acute fracture   Prior studies available for comparison: no       No results found.            Assessment and Plan     Diagnoses and all orders for this visit:    1. Bilateral hip pain (Primary)  -     XR Hips Bilateral With or Without Pelvis 3-4 View    2. Trochanteric bursitis of right hip    3. Trochanteric bursitis of left hip      The patient presents here today for an evaluation  of bilateral hip. X-rays were obtained in the office today and these were reviewed today.     Discussed the risks and benefits of bilateral hip steroid injections today in the office. Patient expressed understanding and wishes to proceed. Patient tolerted the injection well and without any complications.     Home exercises given today and will continue over the counter medications for pain control.      Order placed today for physical therapy.     Call or return if worsening symptoms.    Follow Up     PRN     Patient was given instructions and counseling regarding her condition or for health maintenance advice. Please see specific information pulled into the AVS if appropriate.     Scribed for Ramy Lopez MD by Lisa Tabor.  06/30/25   14:09 EDT    I have personally performed the services described in this document as scribed by the above individual and it is both accurate and complete. Ramy Lopez MD 06/30/25

## 2025-07-01 RX ORDER — TIRZEPATIDE 10 MG/.5ML
INJECTION, SOLUTION SUBCUTANEOUS
Qty: 2 ML | Refills: 1 | OUTPATIENT
Start: 2025-07-01

## 2025-07-18 ENCOUNTER — OFFICE VISIT (OUTPATIENT)
Dept: SLEEP MEDICINE | Facility: HOSPITAL | Age: 45
End: 2025-07-18
Payer: COMMERCIAL

## 2025-07-18 VITALS
WEIGHT: 195.2 LBS | HEART RATE: 83 BPM | BODY MASS INDEX: 38.32 KG/M2 | OXYGEN SATURATION: 96 % | DIASTOLIC BLOOD PRESSURE: 64 MMHG | HEIGHT: 60 IN | SYSTOLIC BLOOD PRESSURE: 110 MMHG

## 2025-07-18 DIAGNOSIS — G47.33 OSA (OBSTRUCTIVE SLEEP APNEA): Primary | ICD-10-CM

## 2025-07-18 PROCEDURE — 99213 OFFICE O/P EST LOW 20 MIN: CPT | Performed by: STUDENT IN AN ORGANIZED HEALTH CARE EDUCATION/TRAINING PROGRAM

## 2025-07-18 PROCEDURE — G0463 HOSPITAL OUTPT CLINIC VISIT: HCPCS

## 2025-07-18 NOTE — PROGRESS NOTES
[facility]    SLEEP CLINIC FOLLOW UP PROGRESS NOTE.    Sunitha Ly  [id number]   1980  45 y.o.  female      PCP: Radha Sethi APRN    Date of visit: 2025    No chief complaint on file.      HPI:  This is a 45 y.o. years old patient is here for the management of obstructive sleep apnea.    Last sleep apnea testing was on 2024 Home sleep apnea testing with weight of 108 kg which showed an AHI of 19.9 and lowest saturation of 79% and there was 13.7 minutes of time with saturation less than 90%.  She underwent overnight oximetry while on PAP August 3, 2024 which showed 2 minutes 28 seconds of time with saturation 88% or less.  She has been losing weight manage she is currently on Wegovy.  She is currently 88.5 kg today with plans for continued weight loss.  She is currently on AutoPap 9 to 20 cm.  She goes to bed between 10 PM and 1 AM and she gets up between 5 AM and 7 AM.  She wakes up 1-2 times per night related to family or restroom use.  She reports she has been feeling more tired here recently.  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  She is not currently taking any medications other than Wegovy and she has plans to discuss the other medications she was on with her PCP soon.  She has a history of anxiety and depression.      ESS: 9    PAP download data dates  through 2025  Device: AirSense 11  Mask type: Fullface  Pressure : 9-20  % days used >4 hours: 77  Average usage days used: 5 hours 40 minutes  AHI: 0.7  Median leak: 3.1  95th % leak 9.7  DME supplier: [employer] (habits pertaining to sleep medicine)  History tobacco use: None  History of alcohol use: A few drinks per month   Caffeine use: 2 drinks per day  OB History          3    Para   3    Term   3       0    AB   0    Living   3         SAB   0    IAB   0    Ectopic   0    Molar   0    Multiple   0    Live Births   3           "      REVIEW OF SYSTEMS:   Pertaining positive symptoms are:  See scanned document      PHYSICAL EXAMINATION:  CONSTITUTIONAL:  Vitals:    07/18/25 1100   BP: 110/64   Pulse: 83   SpO2: 96%   Weight: 88.5 kg (195 lb 3.2 oz)   Height: 152.4 cm (60\")    Body mass index is 38.12 kg/m².   RESP SYSTEM: No respiratory distress  CARDIOVASULAR: Regular rate  NEURO: Answers questions appropriately              ASSESSMENT AND PLAN:  Diagnoses and all orders for this visit:    1. ANGELICA (obstructive sleep apnea) (Primary)  -     PAP Therapy    Recommend daily use of CPAP during total sleep time for target of at least 7 hours of sleep per night with CPAP use.  Maintain similar wake-up time daily.  She is on wegovy and plans for continued weight loss.  Consider repeat HSAT next visit.   Min pressure increased to 10 today per patient request.  Residual AHI 0.7.    I have independently reviewed the PAP compliance data and discussed with the patient the download data and encouarged the patient to continue to use the device. The device is benefiting the patient and the device is medically necessary.  The patient is also instructed to get the supplies from the [employer] and and change them on a regular basis.  A prescription for supplies has been sent to the [employer].     Return in about 6 months (around 1/18/2026). . Patient's questions were answered.      Tyson Jimenez, DO            "

## 2025-07-23 ENCOUNTER — OFFICE VISIT (OUTPATIENT)
Dept: FAMILY MEDICINE CLINIC | Facility: CLINIC | Age: 45
End: 2025-07-23
Payer: COMMERCIAL

## 2025-07-23 VITALS
TEMPERATURE: 98 F | OXYGEN SATURATION: 98 % | BODY MASS INDEX: 38.31 KG/M2 | WEIGHT: 195.1 LBS | DIASTOLIC BLOOD PRESSURE: 72 MMHG | SYSTOLIC BLOOD PRESSURE: 122 MMHG | HEART RATE: 58 BPM | HEIGHT: 60 IN

## 2025-07-23 DIAGNOSIS — Z00.00 ANNUAL PHYSICAL EXAM: Primary | ICD-10-CM

## 2025-07-23 DIAGNOSIS — G47.33 OSA ON CPAP: ICD-10-CM

## 2025-07-23 DIAGNOSIS — R73.03 PREDIABETES: ICD-10-CM

## 2025-07-23 DIAGNOSIS — E66.812 CLASS 2 SEVERE OBESITY DUE TO EXCESS CALORIES WITH SERIOUS COMORBIDITY AND BODY MASS INDEX (BMI) OF 38.0 TO 38.9 IN ADULT: ICD-10-CM

## 2025-07-23 DIAGNOSIS — F33.0 MILD EPISODE OF RECURRENT MAJOR DEPRESSIVE DISORDER: ICD-10-CM

## 2025-07-23 DIAGNOSIS — E78.2 MODERATE MIXED HYPERLIPIDEMIA NOT REQUIRING STATIN THERAPY: ICD-10-CM

## 2025-07-23 DIAGNOSIS — E66.01 CLASS 2 SEVERE OBESITY DUE TO EXCESS CALORIES WITH SERIOUS COMORBIDITY AND BODY MASS INDEX (BMI) OF 38.0 TO 38.9 IN ADULT: ICD-10-CM

## 2025-07-23 DIAGNOSIS — F41.1 GENERALIZED ANXIETY DISORDER: ICD-10-CM

## 2025-07-23 DIAGNOSIS — K76.0 HEPATIC STEATOSIS: ICD-10-CM

## 2025-07-23 DIAGNOSIS — E55.9 VITAMIN D DEFICIENCY: ICD-10-CM

## 2025-07-23 LAB
25(OH)D3 SERPL-MCNC: 41.1 NG/ML (ref 30–100)
ALBUMIN SERPL-MCNC: 3.9 G/DL (ref 3.5–5.2)
ALBUMIN/GLOB SERPL: 1.7 G/DL
ALP SERPL-CCNC: 50 U/L (ref 39–117)
ALT SERPL W P-5'-P-CCNC: 16 U/L (ref 1–33)
ANION GAP SERPL CALCULATED.3IONS-SCNC: 8.7 MMOL/L (ref 5–15)
AST SERPL-CCNC: 15 U/L (ref 1–32)
BASOPHILS # BLD AUTO: 0.05 10*3/MM3 (ref 0–0.2)
BASOPHILS NFR BLD AUTO: 0.8 % (ref 0–1.5)
BILIRUB SERPL-MCNC: 0.5 MG/DL (ref 0–1.2)
BUN SERPL-MCNC: 20 MG/DL (ref 6–20)
BUN/CREAT SERPL: 27.8 (ref 7–25)
CALCIUM SPEC-SCNC: 9.2 MG/DL (ref 8.6–10.5)
CHLORIDE SERPL-SCNC: 105 MMOL/L (ref 98–107)
CHOLEST SERPL-MCNC: 160 MG/DL (ref 0–200)
CO2 SERPL-SCNC: 25.3 MMOL/L (ref 22–29)
CREAT SERPL-MCNC: 0.72 MG/DL (ref 0.57–1)
DEPRECATED RDW RBC AUTO: 47.6 FL (ref 37–54)
EGFRCR SERPLBLD CKD-EPI 2021: 106.5 ML/MIN/1.73
EOSINOPHIL # BLD AUTO: 0.14 10*3/MM3 (ref 0–0.4)
EOSINOPHIL NFR BLD AUTO: 2.3 % (ref 0.3–6.2)
ERYTHROCYTE [DISTWIDTH] IN BLOOD BY AUTOMATED COUNT: 14.2 % (ref 12.3–15.4)
GLOBULIN UR ELPH-MCNC: 2.3 GM/DL
GLUCOSE SERPL-MCNC: 91 MG/DL (ref 65–99)
HCT VFR BLD AUTO: 44.2 % (ref 34–46.6)
HDLC SERPL-MCNC: 48 MG/DL (ref 40–60)
HGB BLD-MCNC: 14.2 G/DL (ref 12–15.9)
IMM GRANULOCYTES # BLD AUTO: 0.02 10*3/MM3 (ref 0–0.05)
IMM GRANULOCYTES NFR BLD AUTO: 0.3 % (ref 0–0.5)
LDLC SERPL CALC-MCNC: 104 MG/DL (ref 0–100)
LDLC/HDLC SERPL: 2.19 {RATIO}
LYMPHOCYTES # BLD AUTO: 1.62 10*3/MM3 (ref 0.7–3.1)
LYMPHOCYTES NFR BLD AUTO: 26.6 % (ref 19.6–45.3)
MCH RBC QN AUTO: 29.4 PG (ref 26.6–33)
MCHC RBC AUTO-ENTMCNC: 32.1 G/DL (ref 31.5–35.7)
MCV RBC AUTO: 91.5 FL (ref 79–97)
MONOCYTES # BLD AUTO: 0.43 10*3/MM3 (ref 0.1–0.9)
MONOCYTES NFR BLD AUTO: 7 % (ref 5–12)
NEUTROPHILS NFR BLD AUTO: 3.84 10*3/MM3 (ref 1.7–7)
NEUTROPHILS NFR BLD AUTO: 63 % (ref 42.7–76)
NRBC BLD AUTO-RTO: 0 /100 WBC (ref 0–0.2)
PLATELET # BLD AUTO: 152 10*3/MM3 (ref 140–450)
PMV BLD AUTO: 11.3 FL (ref 6–12)
POTASSIUM SERPL-SCNC: 4 MMOL/L (ref 3.5–5.2)
PROT SERPL-MCNC: 6.2 G/DL (ref 6–8.5)
RBC # BLD AUTO: 4.83 10*6/MM3 (ref 3.77–5.28)
SODIUM SERPL-SCNC: 139 MMOL/L (ref 136–145)
T4 FREE SERPL-MCNC: 1.24 NG/DL (ref 0.92–1.68)
TRIGL SERPL-MCNC: 35 MG/DL (ref 0–150)
TSH SERPL DL<=0.05 MIU/L-ACNC: 1.04 UIU/ML (ref 0.27–4.2)
VLDLC SERPL-MCNC: 8 MG/DL (ref 5–40)
WBC NRBC COR # BLD AUTO: 6.1 10*3/MM3 (ref 3.4–10.8)

## 2025-07-23 PROCEDURE — 84439 ASSAY OF FREE THYROXINE: CPT

## 2025-07-23 PROCEDURE — 80050 GENERAL HEALTH PANEL: CPT

## 2025-07-23 PROCEDURE — 80061 LIPID PANEL: CPT

## 2025-07-23 PROCEDURE — 83036 HEMOGLOBIN GLYCOSYLATED A1C: CPT

## 2025-07-23 PROCEDURE — 82306 VITAMIN D 25 HYDROXY: CPT

## 2025-07-23 RX ORDER — SEMAGLUTIDE 1.7 MG/.75ML
1.7 INJECTION, SOLUTION SUBCUTANEOUS WEEKLY
Qty: 3 ML | Refills: 0 | Status: SHIPPED | OUTPATIENT
Start: 2025-07-23

## 2025-07-23 NOTE — PROGRESS NOTES
Chief Complaint  Chief Complaint   Patient presents with    Annual Exam    Obesity    Anxiety    Hyperlipidemia       Subjective      Sunitha Ly presents to [facility]  History of Present Illness  The patient presents today for an annual physical exam and follow-up on hyperlipidemia, depression, anxiety, and obesity.    She reports feeling well overall. Weight management has been a focus, starting Zepbound at 240 pounds, with her highest recorded weight being 250 pounds. Currently, she weighs 195 pounds. Due to insurance coverage issues, she has discontinued Zepbound and resumed semaglutide. A letter indicated that if Wegovy proves ineffective, prior authorization for Zepbound can be requested. Her home scale showed a weight of 191 pounds, but she now fluctuates between 193 and 197 pounds. Indigestion was experienced during the first week of Wegovy treatment, likely due to dietary changes, and she took Zofran for relief. Diarrhea issues have resolved. Regular walking began in March 2025, initially covering 2 to 2.5 miles, and has since increased to over 6 miles, incorporating jogging into her routine. She uses 3-pound weights for strength training. Currently, she is on Wegovy 0.5 mg, with one dose remaining.    She has not taken Lexapro or Wellbutrin since December 2024 and feels mentally stable without them. Occasional anxiety is managed with BuSpar, kept on hand for these instances, though it has not been needed this summer. She anticipates needing it upon returning to school.    She began physical therapy in February 2025 for neck issues that arose in December 2024. Arthritis is present in her shoulders, and bursitis in her hips, for which cortisone injections were administered. Physical therapy for her hips is ongoing.        Objective     Medical History:  Past Medical History:   Diagnosis Date    Abnormal Pap smear of cervix     ADHD     Anxiety and depression      Arthritis     Chlamydia     Diabetes mellitus     PRE DIABETES- TAKES MEDS- DOESN'T CHECK BG AT HOME    Endometriosis     Frozen shoulder     Gonorrhea     Kidney stone     Left ovarian cyst     Migraine without aura     Ovarian cyst     Periarthritis of shoulder     Placenta accreta     Urinary tract infection      Past Surgical History:   Procedure Laterality Date     SECTION      3    CHOLECYSTECTOMY      CYSTOSCOPY BLADDER STONE LITHOTRIPSY      HYSTERECTOMY  2014    emergency post partum, Placenta accreta, supra cervical    SALPINGO OOPHORECTOMY N/A 2024    Procedure: DIAGNOSTIC LAPAROSCOPY;  Surgeon: Iraida Anderson DO;  Location: [facility] MA[facility] OR;  Service: Gynecology;  Laterality: N/A;    WISDOM TOOTH EXTRACTION        Social History     Tobacco Use    Smoking status: Never    Smokeless tobacco: Never   Vaping Use    Vaping status: Never Used   Substance Use Topics    Alcohol use: Yes     Comment: I only drink socially and not much at any one time.    Drug use: Never     Family History   Problem Relation Age of Onset    Hypertension Father     Sleep apnea Father     Arthritis Father     Breast cancer Mother 52    Arthritis Mother     Cancer Mother         Breaat Cancer metastasized to her liver in 2022. The cancer lesions on her liver caused liver failure late 2023-early 2023 and she  2023.    Hypertension Brother     Melanoma Paternal Grandfather     Prostate cancer Maternal Grandfather     Uterine cancer Neg Hx     Ovarian cancer Neg Hx     Colon cancer Neg Hx        Medications:  Prior to Admission medications    Medication Sig Start Date End Date Taking? Authorizing Provider   busPIRone (BUSPAR) 5 MG tablet TAKE 1 TABLET BY MOUTH 3 TIMES A DAY 25  Yes Radha Sethi APRN   Hydrocortisone 2.5 % solution As Needed. 25  Yes Provider, MD Joanne   ibuprofen (ADVIL,MOTRIN) 600 MG tablet Take 1 tablet by mouth Every 6 (Six) Hours As  "Needed for Mild Pain or Moderate Pain. 6/25/24  Yes Iraida Anderson DO   ondansetron (ZOFRAN) 8 MG tablet Take 1 tablet by mouth Every 8 (Eight) Hours As Needed for Nausea or Vomiting. 6/25/25  Yes Radha Sethi APRN   Semaglutide-Weight Management (Wegovy) 0.5 MG/0.5ML solution auto-injector Inject 0.5 mL under the skin into the appropriate area as directed 1 (One) Time Per Week. 6/6/25  Yes Radha Sethi APRN   buPROPion XL (WELLBUTRIN XL) 150 MG 24 hr tablet TAKE 1 TABLET BY MOUTH DAILY  Patient not taking: Reported on 6/30/2025 1/14/25   Radha Sethi APRN   escitalopram (LEXAPRO) 10 MG tablet TAKE 1 TABLET BY MOUTH DAILY  Patient not taking: Reported on 6/30/2025 2/17/25   Radha Sethi APRN   vitamin B-12 (CYANOCOBALAMIN) 1000 MCG tablet Take 1 tablet by mouth Daily.  Patient not taking: Reported on 6/30/2025    ProviderJoanne MD   Vitamin D, Cholecalciferol, (CHOLECALCIFEROL) 10 MCG (400 UNIT) tablet Take 500 Units by mouth Daily.  Patient not taking: Reported on 6/30/2025    Joanne Hall MD        Allergies:   Patient has no known allergies.    Health Maintenance Due   Topic Date Due    Pneumococcal Vaccine 0-49 (2 of 2 - PPSV23) 03/16/2015    COVID-19 Vaccine (3 - 2024-25 season) 09/01/2024    TDAP/TD VACCINES (2 - Td or Tdap) 11/18/2024    ANNUAL PHYSICAL  07/18/2025         Vital Signs:   /72 (BP Location: Left arm, Patient Position: Sitting, Cuff Size: Adult)   Pulse 58   Temp 98 °F (36.7 °C) (Oral)   Ht 152.4 cm (60\")   Wt 88.5 kg (195 lb 1.6 oz)   SpO2 98%   BMI 38.10 kg/m²     Wt Readings from Last 3 Encounters:   07/23/25 88.5 kg (195 lb 1.6 oz)   07/18/25 88.5 kg (195 lb 3.2 oz)   06/30/25 88.9 kg (196 lb)     BP Readings from Last 3 Encounters:   07/23/25 122/72   07/18/25 110/64   06/30/25 114/79         Physical Exam  Vitals reviewed.   Constitutional:       Appearance: Normal appearance. She is well-developed. She is obese. "   HENT:      Head: Normocephalic and atraumatic.   Eyes:      Conjunctiva/sclera: Conjunctivae normal.      Pupils: Pupils are equal, round, and reactive to light.   Cardiovascular:      Rate and Rhythm: Normal rate and regular rhythm.      Heart sounds: No murmur heard.     No friction rub. No gallop.   Pulmonary:      Effort: Pulmonary effort is normal.      Breath sounds: Normal breath sounds. No wheezing or rhonchi.   Abdominal:      General: Bowel sounds are normal. There is no distension.      Palpations: Abdomen is soft.      Tenderness: There is no abdominal tenderness.   Skin:     General: Skin is warm and dry.   Neurological:      Mental Status: She is alert and oriented to person, place, and time.      Cranial Nerves: No cranial nerve deficit.   Psychiatric:         Mood and Affect: Mood and affect normal.         Behavior: Behavior normal.         Thought Content: Thought content normal.         Judgment: Judgment normal.       Physical Exam  Respiratory: Clear to auscultation, no wheezing, rales or rhonchi      Result Review :    The following data was reviewed by SOY Ghosh on 07/23/25 at 14:03 EDT:    Common labs          1/21/2025    16:47   Common Labs   Glucose 94    BUN 14    Creatinine 0.81    Sodium 139    Potassium 4.0    Chloride 101    Calcium 9.5    Albumin 3.9    Total Bilirubin 0.3    Alkaline Phosphatase 70    AST (SGOT) 14    ALT (SGPT) 20    WBC 7.29    Hemoglobin 14.8    Hematocrit 43.8    Platelets 200    Total Cholesterol 161    Triglycerides 90    HDL Cholesterol 41    LDL Cholesterol  103    Hemoglobin A1C 5.00          No Images in the past 120 days found..    Results                 Assessment and Plan    Diagnoses and all orders for this visit:    1. Annual physical exam (Primary)  -     CBC & Differential  -     Comprehensive Metabolic Panel  -     Lipid Panel  -     TSH+Free T4    2. Hepatic steatosis  -     CBC & Differential  -     Comprehensive Metabolic  Panel    3. ANGELICA on CPAP    4. Mild episode of recurrent major depressive disorder    5. Generalized anxiety disorder    6. Moderate mixed hyperlipidemia not requiring statin therapy    7. Class 2 severe obesity due to excess calories with serious comorbidity and body mass index (BMI) of 38.0 to 38.9 in adult  -     Semaglutide-Weight Management (Wegovy) 1.7 MG/0.75ML solution auto-injector; Inject 0.75 mL under the skin into the appropriate area as directed 1 (One) Time Per Week.  Dispense: 3 mL; Refill: 0    8. Prediabetes  -     Hemoglobin A1c    9. Vitamin D deficiency  -     Vitamin D 25 hydroxy       Assessment & Plan  1. Obesity.  - She has lost a significant amount of weight, approximately 50 pounds, and is now under 200 pounds.  - The goal is to maintain her weight below 200 pounds during the transition from Zepbound to Wegovy.  - The dosage of Wegovy will be increased to 1.7 mg. If tolerated well, it will be further increased to 2.4 mg, which will be maintained for a month.  - If weight loss plateaus, Zepbound may be reconsidered with prior authorization.    2. Hyperlipidemia.  - Repeat labs will be conducted today to monitor cholesterol levels and blood sugar.  - Labs will be checked to ensure improvement due to better diet and increased physical activity.  - Review of previous records and current progress discussed.  - Blood tests ordered for cholesterol and blood sugar levels.    3. Depression.  - She has not taken Lexapro or Wellbutrin since 12/2024 and feels stable without them.  - No current symptoms of depression reported.  - Discussion about mental health stability without medication.  - No new medications prescribed for depression.    4. Anxiety.  - She occasionally experiences anxiety and keeps BuSpar on hand for these instances.  - She has not needed BuSpar this summer but anticipates needing it when she returns to school.  - Counseling provided regarding situational anxiety and management  strategies.  - BuSpar prescribed as needed for anxiety.          Smoking Cessation:    Sunitha Ly  reports that she has never smoked. She has never used smokeless tobacco.             Follow Up   Return in about 6 months (around 1/23/2026) for Next scheduled follow up.  Patient was given instructions and counseling regarding her condition or for health maintenance advice. Please see specific information pulled into the AVS if appropriate.     Please note that portions of this note were completed with a voice recognition program.    Patient or patient representative verbalized consent for the use of Ambient Listening during the visit with  SOY Ghosh for chart documentation. 7/23/2025  14:02 EDT

## 2025-07-24 LAB — HBA1C MFR BLD: 5.2 % (ref 4.8–5.6)

## 2025-08-01 ENCOUNTER — HOSPITAL ENCOUNTER (OUTPATIENT)
Dept: MAMMOGRAPHY | Facility: HOSPITAL | Age: 45
Discharge: HOME | End: 2025-08-01
Admitting: NURSE PRACTITIONER
Payer: COMMERCIAL

## 2025-08-01 DIAGNOSIS — Z12.31 ENCOUNTER FOR SCREENING MAMMOGRAM FOR MALIGNANT NEOPLASM OF BREAST: ICD-10-CM

## 2025-08-01 PROCEDURE — 77067 SCR MAMMO BI INCL CAD: CPT

## 2025-08-01 PROCEDURE — 77063 BREAST TOMOSYNTHESIS BI: CPT

## 2025-08-21 DIAGNOSIS — E66.01 CLASS 2 SEVERE OBESITY DUE TO EXCESS CALORIES WITH SERIOUS COMORBIDITY AND BODY MASS INDEX (BMI) OF 38.0 TO 38.9 IN ADULT: ICD-10-CM

## 2025-08-21 DIAGNOSIS — E66.812 CLASS 2 SEVERE OBESITY DUE TO EXCESS CALORIES WITH SERIOUS COMORBIDITY AND BODY MASS INDEX (BMI) OF 38.0 TO 38.9 IN ADULT: ICD-10-CM

## 2025-08-21 RX ORDER — SEMAGLUTIDE 1.7 MG/.75ML
INJECTION, SOLUTION SUBCUTANEOUS
Qty: 3 ML | Refills: 0 | Status: SHIPPED | OUTPATIENT
Start: 2025-08-21

## (undated) DEVICE — PREP TRAY WITH CHG: Brand: MEDLINE INDUSTRIES, INC.

## (undated) DEVICE — ADHS SKIN SURG TISS VISC PREMIERPRO EXOFIN HI/VISC FAST/DRY

## (undated) DEVICE — HYPODERMIC SAFETY NEEDLE: Brand: MONOJECT

## (undated) DEVICE — 40585 XL ADVANCED TRENDELENBURG POSITIONING KIT: Brand: 40585 XL ADVANCED TRENDELENBURG POSITIONING KIT

## (undated) DEVICE — INTENDED FOR TISSUE SEPARATION, AND OTHER PROCEDURES THAT REQUIRE A SHARP SURGICAL BLADE TO PUNCTURE OR CUT.: Brand: BARD-PARKER ® CARBON RIB-BACK BLADES

## (undated) DEVICE — LITHOTOMY-YELLOW FINS: Brand: MEDLINE INDUSTRIES, INC.

## (undated) DEVICE — TOWEL,OR,DSP,ST,BLUE,STD,4/PK,20PK/CS: Brand: MEDLINE

## (undated) DEVICE — KIT,ANTI FOG,W/SPONGE & FLUID,SOFT PACK: Brand: MEDLINE

## (undated) DEVICE — PCH SURG INST LAP 2 LF

## (undated) DEVICE — DUAL LUMEN STOMACH TUBE: Brand: SALEM SUMP

## (undated) DEVICE — TBG INSUFFLATION W/CPC CONNEC: Brand: MEDLINE INDUSTRIES, INC.

## (undated) DEVICE — SUT MNCRYL PLS ANTIB UD 4/0 PS2 18IN

## (undated) DEVICE — SOL IRR NACL 0.9PCT BT 1000ML

## (undated) DEVICE — SLV SCD KN/LEN ADJ EXPRSS BLENDED MD 1P/U

## (undated) DEVICE — GLV SURG BIOGEL LTX PF 6 1/2

## (undated) DEVICE — TOTAL TRAY, 16FR 10ML SIL FOLEY, URN: Brand: MEDLINE

## (undated) DEVICE — INSUFFLATION NEEDLE TO ESTABLISH PNEUMOPERITONEUM.: Brand: INSUFFLATION NEEDLE

## (undated) DEVICE — SHEET,DRAPE,70X85,STERILE: Brand: MEDLINE

## (undated) DEVICE — SYR LL TP 10ML STRL

## (undated) DEVICE — VAGINAL PREP TRAY: Brand: MEDLINE INDUSTRIES, INC.